# Patient Record
Sex: FEMALE | Race: WHITE | Employment: PART TIME | URBAN - NONMETROPOLITAN AREA
[De-identification: names, ages, dates, MRNs, and addresses within clinical notes are randomized per-mention and may not be internally consistent; named-entity substitution may affect disease eponyms.]

---

## 2017-01-12 ENCOUNTER — OFFICE VISIT (OUTPATIENT)
Dept: FAMILY MEDICINE CLINIC | Age: 37
End: 2017-01-12

## 2017-01-12 VITALS
RESPIRATION RATE: 16 BRPM | OXYGEN SATURATION: 99 % | DIASTOLIC BLOOD PRESSURE: 60 MMHG | TEMPERATURE: 98.2 F | WEIGHT: 250.5 LBS | SYSTOLIC BLOOD PRESSURE: 114 MMHG | BODY MASS INDEX: 41.69 KG/M2 | HEART RATE: 92 BPM

## 2017-01-12 DIAGNOSIS — R09.82 POSTNASAL DRIP: Primary | ICD-10-CM

## 2017-01-12 DIAGNOSIS — R05.8 UPPER AIRWAY COUGH SYNDROME: ICD-10-CM

## 2017-01-12 PROBLEM — J06.9 UPPER RESPIRATORY TRACT INFECTION: Status: ACTIVE | Noted: 2017-01-12

## 2017-01-12 PROCEDURE — 99213 OFFICE O/P EST LOW 20 MIN: CPT | Performed by: NURSE PRACTITIONER

## 2017-01-12 RX ORDER — FLUTICASONE PROPIONATE 50 MCG
SPRAY, SUSPENSION (ML) NASAL
Qty: 1 BOTTLE | Refills: 5 | Status: SHIPPED | OUTPATIENT
Start: 2017-01-12 | End: 2018-04-16

## 2017-01-12 ASSESSMENT — ENCOUNTER SYMPTOMS
FACIAL SWELLING: 0
SHORTNESS OF BREATH: 0
CHEST TIGHTNESS: 0
VOMITING: 0
NAUSEA: 0
SORE THROAT: 0
EYES NEGATIVE: 1
DIARRHEA: 0
COUGH: 1
ABDOMINAL PAIN: 0
TROUBLE SWALLOWING: 0
SINUS PRESSURE: 1
WHEEZING: 0
RHINORRHEA: 0

## 2017-02-13 ENCOUNTER — TELEPHONE (OUTPATIENT)
Dept: FAMILY MEDICINE CLINIC | Age: 37
End: 2017-02-13

## 2017-02-14 RX ORDER — OSELTAMIVIR PHOSPHATE 75 MG/1
75 CAPSULE ORAL 2 TIMES DAILY
Qty: 10 CAPSULE | Refills: 0 | Status: SHIPPED | OUTPATIENT
Start: 2017-02-14 | End: 2017-02-19

## 2017-03-09 ENCOUNTER — TELEPHONE (OUTPATIENT)
Dept: FAMILY MEDICINE CLINIC | Age: 37
End: 2017-03-09

## 2017-03-14 ENCOUNTER — OFFICE VISIT (OUTPATIENT)
Dept: FAMILY MEDICINE CLINIC | Age: 37
End: 2017-03-14

## 2017-03-14 VITALS
WEIGHT: 252.2 LBS | HEART RATE: 94 BPM | HEIGHT: 64 IN | DIASTOLIC BLOOD PRESSURE: 70 MMHG | TEMPERATURE: 98 F | OXYGEN SATURATION: 99 % | SYSTOLIC BLOOD PRESSURE: 112 MMHG | BODY MASS INDEX: 43.06 KG/M2

## 2017-03-14 DIAGNOSIS — Z20.818 EXPOSURE TO STREP THROAT: ICD-10-CM

## 2017-03-14 DIAGNOSIS — D22.9 NEVUS: ICD-10-CM

## 2017-03-14 DIAGNOSIS — J40 BRONCHITIS: ICD-10-CM

## 2017-03-14 DIAGNOSIS — J02.9 SORE THROAT: Primary | ICD-10-CM

## 2017-03-14 DIAGNOSIS — Z20.828 EXPOSURE TO THE FLU: ICD-10-CM

## 2017-03-14 LAB
INFLUENZA A ANTIBODY: NEGATIVE
INFLUENZA B ANTIBODY: NEGATIVE
S PYO AG THROAT QL: NORMAL

## 2017-03-14 PROCEDURE — 87804 INFLUENZA ASSAY W/OPTIC: CPT | Performed by: FAMILY MEDICINE

## 2017-03-14 PROCEDURE — 99214 OFFICE O/P EST MOD 30 MIN: CPT | Performed by: FAMILY MEDICINE

## 2017-03-14 PROCEDURE — 87880 STREP A ASSAY W/OPTIC: CPT | Performed by: FAMILY MEDICINE

## 2017-03-14 RX ORDER — LEVOFLOXACIN 500 MG/1
500 TABLET, FILM COATED ORAL DAILY
Qty: 7 TABLET | Refills: 0 | Status: SHIPPED | OUTPATIENT
Start: 2017-03-14 | End: 2017-03-21

## 2017-03-14 RX ORDER — TRAMADOL HYDROCHLORIDE 50 MG/1
50 TABLET ORAL EVERY 6 HOURS PRN
Qty: 10 TABLET | Refills: 0 | Status: SHIPPED | OUTPATIENT
Start: 2017-03-14 | End: 2017-03-21

## 2017-03-14 RX ORDER — LEVOFLOXACIN 500 MG/1
500 TABLET, FILM COATED ORAL DAILY
Qty: 7 TABLET | Refills: 0 | Status: SHIPPED | OUTPATIENT
Start: 2017-03-14 | End: 2017-03-14 | Stop reason: SDUPTHER

## 2017-03-26 ASSESSMENT — ENCOUNTER SYMPTOMS
NAUSEA: 1
ABDOMINAL PAIN: 0
BLURRED VISION: 0
COUGH: 0
SORE THROAT: 1
VOMITING: 0

## 2017-08-10 ENCOUNTER — OFFICE VISIT (OUTPATIENT)
Dept: FAMILY MEDICINE CLINIC | Age: 37
End: 2017-08-10

## 2017-08-10 ENCOUNTER — HOSPITAL ENCOUNTER (OUTPATIENT)
Dept: GENERAL RADIOLOGY | Age: 37
Discharge: HOME | End: 2017-08-10
Attending: FAMILY MEDICINE | Admitting: FAMILY MEDICINE

## 2017-08-10 VITALS
OXYGEN SATURATION: 98 % | BODY MASS INDEX: 41.83 KG/M2 | HEART RATE: 93 BPM | WEIGHT: 245 LBS | HEIGHT: 64 IN | DIASTOLIC BLOOD PRESSURE: 78 MMHG | SYSTOLIC BLOOD PRESSURE: 114 MMHG

## 2017-08-10 DIAGNOSIS — R07.1 CHEST PAIN VARYING WITH BREATHING: ICD-10-CM

## 2017-08-10 DIAGNOSIS — R06.00 DYSPNEA, UNSPECIFIED TYPE: ICD-10-CM

## 2017-08-10 DIAGNOSIS — J02.9 SORE THROAT: Primary | ICD-10-CM

## 2017-08-10 DIAGNOSIS — R51.9 HEADACHE, UNSPECIFIED HEADACHE TYPE: ICD-10-CM

## 2017-08-10 LAB — S PYO AG THROAT QL: NORMAL

## 2017-08-10 PROCEDURE — 87880 STREP A ASSAY W/OPTIC: CPT | Performed by: FAMILY MEDICINE

## 2017-08-10 PROCEDURE — 99213 OFFICE O/P EST LOW 20 MIN: CPT | Performed by: FAMILY MEDICINE

## 2017-08-10 ASSESSMENT — ENCOUNTER SYMPTOMS
BLURRED VISION: 0
SHORTNESS OF BREATH: 1
SPUTUM PRODUCTION: 0
COUGH: 0
SORE THROAT: 1
WHEEZING: 0
ABDOMINAL PAIN: 0

## 2018-03-02 ENCOUNTER — OFFICE VISIT (OUTPATIENT)
Dept: FAMILY MEDICINE CLINIC | Age: 38
End: 2018-03-02

## 2018-03-02 VITALS
OXYGEN SATURATION: 98 % | DIASTOLIC BLOOD PRESSURE: 70 MMHG | WEIGHT: 256.4 LBS | HEART RATE: 88 BPM | SYSTOLIC BLOOD PRESSURE: 114 MMHG | BODY MASS INDEX: 43.77 KG/M2 | HEIGHT: 64 IN

## 2018-03-02 DIAGNOSIS — F41.0 PANIC ATTACK: ICD-10-CM

## 2018-03-02 DIAGNOSIS — F41.9 ANXIETY: ICD-10-CM

## 2018-03-02 DIAGNOSIS — N61.1 ABSCESS OF FEMALE BREAST: Primary | ICD-10-CM

## 2018-03-02 PROCEDURE — 99214 OFFICE O/P EST MOD 30 MIN: CPT | Performed by: FAMILY MEDICINE

## 2018-03-02 RX ORDER — AMOXICILLIN AND CLAVULANATE POTASSIUM 875; 125 MG/1; MG/1
1 TABLET, FILM COATED ORAL 3 TIMES DAILY
Qty: 21 TABLET | Refills: 0 | Status: SHIPPED | OUTPATIENT
Start: 2018-03-02 | End: 2018-03-09

## 2018-03-02 RX ORDER — BUSPIRONE HYDROCHLORIDE 7.5 MG/1
7.5 TABLET ORAL 3 TIMES DAILY PRN
Qty: 60 TABLET | Refills: 2 | Status: SHIPPED | OUTPATIENT
Start: 2018-03-02 | End: 2018-04-16 | Stop reason: ALTCHOICE

## 2018-03-02 RX ORDER — ESCITALOPRAM OXALATE 10 MG/1
10 TABLET ORAL DAILY
Qty: 30 TABLET | Refills: 3 | Status: SHIPPED | OUTPATIENT
Start: 2018-03-02 | End: 2018-09-18

## 2018-03-02 ASSESSMENT — PATIENT HEALTH QUESTIONNAIRE - PHQ9
1. LITTLE INTEREST OR PLEASURE IN DOING THINGS: 0
2. FEELING DOWN, DEPRESSED OR HOPELESS: 0
SUM OF ALL RESPONSES TO PHQ QUESTIONS 1-9: 0
SUM OF ALL RESPONSES TO PHQ9 QUESTIONS 1 & 2: 0

## 2018-03-02 NOTE — PROGRESS NOTES
BP: 114/70   Site: Left Arm   Position: Sitting   Cuff Size: Large Adult   Pulse: 88   SpO2: 98%   Weight: 256 lb 6.4 oz (116.3 kg)   Height: 5' 4\" (1.626 m)          BP Readings from Last 3 Encounters:   03/02/18 114/70   08/10/17 114/78   03/14/17 112/70     Wt Readings from Last 3 Encounters:   03/02/18 256 lb 6.4 oz (116.3 kg)   08/10/17 245 lb (111.1 kg)   03/14/17 252 lb 3.2 oz (114.4 kg)     Body mass index is 44.01 kg/m². No results found for this visit on 03/02/18. Physical Exam   Constitutional: She is oriented to person, place, and time. She appears well-developed and well-nourished. No distress. HENT:   Head: Normocephalic and atraumatic. Eyes: Conjunctivae are normal. Pupils are equal, round, and reactive to light. Cardiovascular: Normal rate, regular rhythm and normal heart sounds. No murmur heard. Pulmonary/Chest: Effort normal and breath sounds normal. No respiratory distress. She has no wheezes. Neurological: She is alert and oriented to person, place, and time. Skin: Skin is warm and dry. She is not diaphoretic. There is erythema (right breast with edema, superficial seeping and non tense but tender lump in the lower half of breast). Nursing note and vitals reviewed. _________________________________________________  Assessment:     Lorenzo Villalta was seen today for wound infection and anxiety. Diagnoses and all orders for this visit:    Abscess of female breast  -     amoxicillin-clavulanate (AUGMENTIN) 875-125 MG per tablet; Take 1 tablet by mouth 3 times daily for 7 days    Anxiety  -     escitalopram (LEXAPRO) 10 MG tablet; Take 1 tablet by mouth daily    Panic attack  -     busPIRone (BUSPAR) 7.5 MG tablet; Take 1 tablet by mouth 3 times daily as needed (anxiety)        _________________________________________________  Plan:     I asked her to consider counselling, she declined referral.  Meds as above for mood.     Med as above for infection, also warm compresses and call if not improving. Return if symptoms worsen or fail to improve. Encounter note is signed electronically at date and time of note closure.

## 2018-03-02 NOTE — PATIENT INSTRUCTIONS
volunteer to help others. Being alone sometimes makes things seem worse than they are. · Get at least 30 minutes of exercise on most days of the week to relieve stress. Walking is a good choice. You also may want to do other activities, such as running, swimming, cycling, or playing tennis or team sports. Relaxation techniques  Do relaxation exercises for 10 to 20 minutes a day. You can play soothing, relaxing music while you do them, if you wish. · Tell others in your house that you are going to do your relaxation exercises. Ask them not to disturb you. · Find a comfortable place, away from all distractions and noise. · Lie down on your back, or sit with your back straight. · Focus on your breathing. Make it slow and steady. · Breathe in through your nose. Breathe out through either your nose or mouth. · Breathe deeply, filling up the area between your navel and your rib cage. Breathe so that your belly goes up and down. · Do not hold your breath. · Breathe like this for 5 to 10 minutes. Notice the feeling of calmness throughout your whole body. As you continue to breathe slowly and deeply, relax by doing the following for another 5 to 10 minutes:  · Tighten and relax each muscle group in your body. You can begin at your toes and work your way up to your head. · Imagine your muscle groups relaxing and becoming heavy. · Empty your mind of all thoughts. · Let yourself relax more and more deeply. · Become aware of the state of calmness that surrounds you. · When your relaxation time is over, you can bring yourself back to alertness by moving your fingers and toes and then your hands and feet and then stretching and moving your entire body. Sometimes people fall asleep during relaxation, but they usually wake up shortly afterward. · Always give yourself time to return to full alertness before you drive a car or do anything that might cause an accident if you are not fully alert.  Never play a relaxation tape while driving a car. When should you call for help? Call 911 anytime you think you may need emergency care. For example, call if:  ? · You feel you cannot stop from hurting yourself or someone else. ? Watch closely for changes in your health, and be sure to contact your doctor if:  ? · Your panic attacks get worse. ? · You have new or different anxiety. ? · You are not getting better as expected. Where can you learn more? Go to https://Made2Manage Systems.Reduce Data. org and sign in to your Thinkspeed account. Enter H601 in the [address] box to learn more about \"Panic Attacks: Care Instructions. \"     If you do not have an account, please click on the \"Sign Up Now\" link. Current as of: May 12, 2017  Content Version: 11.5  © 5573-1995 Healthwise, Incorporated. Care instructions adapted under license by [facility] ([facility]). If you have questions about a medical condition or this instruction, always ask your healthcare professional. [place] [address] any warranty or liability for your use of this information.

## 2018-03-06 ENCOUNTER — TELEPHONE (OUTPATIENT)
Dept: FAMILY MEDICINE CLINIC | Age: 38
End: 2018-03-06

## 2018-03-06 NOTE — TELEPHONE ENCOUNTER
Suggest she continue current medications and hydrate to see if her headache will improve with that after 1 or 2 days. It may also be from breast infection. If headache is not improving it may be a side effect of Lexapro and she would have to stop that medication. She may need a followup.

## 2018-03-11 ASSESSMENT — ENCOUNTER SYMPTOMS
ABDOMINAL PAIN: 0
BLURRED VISION: 0
COUGH: 0

## 2018-04-13 ENCOUNTER — OFFICE VISIT (OUTPATIENT)
Dept: FAMILY MEDICINE CLINIC | Age: 38
End: 2018-04-13

## 2018-04-13 VITALS
OXYGEN SATURATION: 98 % | WEIGHT: 265.2 LBS | SYSTOLIC BLOOD PRESSURE: 122 MMHG | DIASTOLIC BLOOD PRESSURE: 70 MMHG | HEIGHT: 64 IN | RESPIRATION RATE: 14 BRPM | HEART RATE: 86 BPM | BODY MASS INDEX: 45.27 KG/M2

## 2018-04-13 DIAGNOSIS — K08.89 PAIN, DENTAL: ICD-10-CM

## 2018-04-13 DIAGNOSIS — R31.9 URINARY TRACT INFECTION WITH HEMATURIA, SITE UNSPECIFIED: Primary | ICD-10-CM

## 2018-04-13 DIAGNOSIS — N39.0 URINARY TRACT INFECTION WITH HEMATURIA, SITE UNSPECIFIED: Primary | ICD-10-CM

## 2018-04-13 LAB
BILIRUBIN, POC: NEGATIVE
BLOOD URINE, POC: ABNORMAL
CLARITY, POC: ABNORMAL
COLOR, POC: ABNORMAL
CONTROL: NORMAL
GLUCOSE URINE, POC: NEGATIVE
KETONES, POC: NEGATIVE
LEUKOCYTE EST, POC: ABNORMAL
NITRITE, POC: NEGATIVE
PH, POC: 5.5
PREGNANCY TEST URINE, POC: NORMAL
PROTEIN, POC: ABNORMAL
SPECIFIC GRAVITY, POC: 1.02
UROBILINOGEN, POC: ABNORMAL

## 2018-04-13 PROCEDURE — 81002 URINALYSIS NONAUTO W/O SCOPE: CPT | Performed by: NURSE PRACTITIONER

## 2018-04-13 PROCEDURE — 99213 OFFICE O/P EST LOW 20 MIN: CPT | Performed by: NURSE PRACTITIONER

## 2018-04-13 PROCEDURE — 81025 URINE PREGNANCY TEST: CPT | Performed by: NURSE PRACTITIONER

## 2018-04-13 RX ORDER — SULFAMETHOXAZOLE AND TRIMETHOPRIM 800; 160 MG/1; MG/1
1 TABLET ORAL 2 TIMES DAILY
Qty: 14 TABLET | Refills: 0 | Status: SHIPPED | OUTPATIENT
Start: 2018-04-13 | End: 2018-04-20

## 2018-04-13 RX ORDER — PHENAZOPYRIDINE HYDROCHLORIDE 200 MG/1
200 TABLET, FILM COATED ORAL 3 TIMES DAILY PRN
Qty: 6 TABLET | Refills: 0 | Status: SHIPPED | OUTPATIENT
Start: 2018-04-13 | End: 2018-04-15

## 2018-04-13 RX ORDER — HYDROCODONE BITARTRATE AND ACETAMINOPHEN 5; 325 MG/1; MG/1
1 TABLET ORAL 2 TIMES DAILY PRN
Qty: 14 TABLET | Refills: 0 | Status: SHIPPED | OUTPATIENT
Start: 2018-04-13 | End: 2018-04-20

## 2018-04-13 ASSESSMENT — ENCOUNTER SYMPTOMS
ABDOMINAL PAIN: 0
VOMITING: 0
SHORTNESS OF BREATH: 0
BACK PAIN: 0
NAUSEA: 0

## 2018-04-15 LAB
ORGANISM: ABNORMAL
URINE CULTURE, ROUTINE: ABNORMAL
URINE CULTURE, ROUTINE: ABNORMAL

## 2018-04-16 ENCOUNTER — OFFICE VISIT (OUTPATIENT)
Dept: FAMILY MEDICINE CLINIC | Age: 38
End: 2018-04-16

## 2018-04-16 ENCOUNTER — TELEPHONE (OUTPATIENT)
Dept: FAMILY MEDICINE CLINIC | Age: 38
End: 2018-04-16

## 2018-04-16 VITALS
DIASTOLIC BLOOD PRESSURE: 64 MMHG | SYSTOLIC BLOOD PRESSURE: 108 MMHG | OXYGEN SATURATION: 98 % | BODY MASS INDEX: 43.6 KG/M2 | WEIGHT: 254 LBS | HEART RATE: 82 BPM

## 2018-04-16 DIAGNOSIS — L23.9 ALLERGIC DERMATITIS: Primary | ICD-10-CM

## 2018-04-16 PROCEDURE — 99213 OFFICE O/P EST LOW 20 MIN: CPT | Performed by: NURSE PRACTITIONER

## 2018-04-16 RX ORDER — PREDNISONE 20 MG/1
40 TABLET ORAL DAILY
Qty: 10 TABLET | Refills: 0 | Status: SHIPPED | OUTPATIENT
Start: 2018-04-16 | End: 2018-04-21

## 2018-04-16 ASSESSMENT — ENCOUNTER SYMPTOMS
DIARRHEA: 0
CHEST TIGHTNESS: 0
VOMITING: 0
COUGH: 0
EYE PAIN: 0
NAIL CHANGES: 0
SHORTNESS OF BREATH: 0
NAUSEA: 0
RHINORRHEA: 0
WHEEZING: 0
SORE THROAT: 0

## 2018-09-18 ENCOUNTER — OFFICE VISIT (OUTPATIENT)
Dept: FAMILY MEDICINE CLINIC | Age: 38
End: 2018-09-18

## 2018-09-18 VITALS
BODY MASS INDEX: 40.82 KG/M2 | WEIGHT: 237.8 LBS | HEART RATE: 80 BPM | SYSTOLIC BLOOD PRESSURE: 120 MMHG | OXYGEN SATURATION: 99 % | DIASTOLIC BLOOD PRESSURE: 76 MMHG

## 2018-09-18 DIAGNOSIS — S00.32XA: Primary | ICD-10-CM

## 2018-09-18 PROCEDURE — 99213 OFFICE O/P EST LOW 20 MIN: CPT | Performed by: NURSE PRACTITIONER

## 2018-09-18 RX ORDER — DIAPER,BRIEF,INFANT-TODD,DISP
EACH MISCELLANEOUS
Qty: 30 G | Refills: 0 | Status: SHIPPED | OUTPATIENT
Start: 2018-09-18 | End: 2018-09-28

## 2018-09-18 RX ORDER — PANTOPRAZOLE SODIUM 40 MG/1
40 TABLET, DELAYED RELEASE ORAL
COMMUNITY
Start: 2018-09-11 | End: 2018-10-08 | Stop reason: SDUPTHER

## 2018-09-18 RX ORDER — HYDROCODONE BITARTRATE AND ACETAMINOPHEN 5; 325 MG/1; MG/1
TABLET ORAL
Refills: 0 | COMMUNITY
Start: 2018-09-06 | End: 2018-10-10 | Stop reason: ALTCHOICE

## 2018-09-18 ASSESSMENT — ENCOUNTER SYMPTOMS
NAUSEA: 0
VOMITING: 0
RESPIRATORY NEGATIVE: 1
DIARRHEA: 0

## 2018-09-18 NOTE — PROGRESS NOTES
Sunita Challenger   45 y.o.  female  [id number]      Chief Complaint   Patient presents with    Rash     x last night on right nostril, she states she just got home from [facility]. Subjective:  45 y. o.female is here for a follow up. She has the following chronic/acute medical problems:   Patient Active Problem List   Diagnosis    Family history of multiple sclerosis    Pneumonia of right upper lobe due to infectious organism ([address]    Chronic nonintractable headache    Upper respiratory tract infection    Dysmenorrhea     Patient states she is here today she has some blister on the inside of her right nostril. Patient denies any pain at this time. Patient states she gets cold sores but has had impetego before and is worried this is what she may have. She said she was recently she was in [place] and may be afraid she picked up something from the beach. Review of Systems   Constitutional: Negative for appetite change, chills, fatigue and fever. HENT: Negative. Respiratory: Negative. Cardiovascular: Negative for chest pain and palpitations. Gastrointestinal: Negative for diarrhea, nausea and vomiting. Skin: Positive for rash (blister right side nostril). Neurological: Negative for dizziness, light-headedness and headaches. Current Outpatient Prescriptions   Medication Sig Dispense Refill    pantoprazole (PROTONIX) 40 MG tablet Take 40 mg by mouth      bacitracin zinc 500 UNIT/GM ointment Apply topically 2 times daily. 30 g 0    ondansetron (ZOFRAN) 4 MG tablet Take 1 tablet by mouth every 8 hours as needed for Nausea or Vomiting 20 tablet 0    Multiple Vitamins-Minerals (MULTI COMPLETE PO) Take by mouth      naproxen (NAPROSYN) 500 MG tablet   0    acetaminophen (TYLENOL) 500 MG tablet Take 500 mg by mouth every 6 hours as needed for Pain      ibuprofen (ADVIL;MOTRIN) 800 MG tablet Take 800 mg by mouth every 6 hours as needed.         HYDROcodone-acetaminophen ([address])

## 2018-10-08 ENCOUNTER — OFFICE VISIT (OUTPATIENT)
Dept: FAMILY MEDICINE CLINIC | Age: 38
End: 2018-10-08
Payer: COMMERCIAL

## 2018-10-08 VITALS
HEIGHT: 64 IN | BODY MASS INDEX: 40.7 KG/M2 | WEIGHT: 238.4 LBS | TEMPERATURE: 98.9 F | OXYGEN SATURATION: 98 % | SYSTOLIC BLOOD PRESSURE: 116 MMHG | DIASTOLIC BLOOD PRESSURE: 74 MMHG | HEART RATE: 88 BPM

## 2018-10-08 DIAGNOSIS — J40 BRONCHITIS: Primary | ICD-10-CM

## 2018-10-08 DIAGNOSIS — K29.00 ACUTE SUPERFICIAL GASTRITIS WITHOUT HEMORRHAGE: ICD-10-CM

## 2018-10-08 DIAGNOSIS — T39.395A NSAID INDUCED GASTRITIS: ICD-10-CM

## 2018-10-08 DIAGNOSIS — K29.60 NSAID INDUCED GASTRITIS: ICD-10-CM

## 2018-10-08 PROCEDURE — 99213 OFFICE O/P EST LOW 20 MIN: CPT | Performed by: FAMILY MEDICINE

## 2018-10-08 RX ORDER — AZITHROMYCIN 250 MG/1
TABLET, FILM COATED ORAL
Qty: 6 TABLET | Refills: 1 | Status: SHIPPED | OUTPATIENT
Start: 2018-10-08 | End: 2018-10-18

## 2018-10-08 RX ORDER — PANTOPRAZOLE SODIUM 40 MG/1
40 TABLET, DELAYED RELEASE ORAL DAILY
Qty: 30 TABLET | Refills: 2 | Status: SHIPPED | OUTPATIENT
Start: 2018-10-08 | End: 2019-01-09 | Stop reason: SDUPTHER

## 2018-10-10 PROBLEM — T39.395A NSAID INDUCED GASTRITIS: Status: ACTIVE | Noted: 2018-10-10

## 2018-10-10 PROBLEM — J06.9 UPPER RESPIRATORY TRACT INFECTION: Status: RESOLVED | Noted: 2017-01-12 | Resolved: 2018-10-10

## 2018-10-10 PROBLEM — K29.60 NSAID INDUCED GASTRITIS: Status: ACTIVE | Noted: 2018-10-10

## 2018-10-12 ENCOUNTER — TELEPHONE (OUTPATIENT)
Dept: FAMILY MEDICINE CLINIC | Age: 38
End: 2018-10-12

## 2018-10-18 ENCOUNTER — HOSPITAL ENCOUNTER (OUTPATIENT)
Age: 38
Discharge: HOME | End: 2018-10-18
Payer: COMMERCIAL

## 2018-10-18 ENCOUNTER — OFFICE VISIT (OUTPATIENT)
Dept: FAMILY MEDICINE CLINIC | Age: 38
End: 2018-10-18
Payer: COMMERCIAL

## 2018-10-18 ENCOUNTER — HOSPITAL ENCOUNTER (OUTPATIENT)
Dept: GENERAL RADIOLOGY | Age: 38
Discharge: HOME | End: 2018-10-18
Payer: COMMERCIAL

## 2018-10-18 VITALS
HEART RATE: 78 BPM | DIASTOLIC BLOOD PRESSURE: 80 MMHG | WEIGHT: 236 LBS | BODY MASS INDEX: 40.51 KG/M2 | OXYGEN SATURATION: 97 % | SYSTOLIC BLOOD PRESSURE: 116 MMHG

## 2018-10-18 DIAGNOSIS — M54.2 CERVICALGIA: ICD-10-CM

## 2018-10-18 DIAGNOSIS — K29.60 NSAID INDUCED GASTRITIS: ICD-10-CM

## 2018-10-18 DIAGNOSIS — R00.2 PALPITATION: ICD-10-CM

## 2018-10-18 DIAGNOSIS — M79.10 MYALGIA: ICD-10-CM

## 2018-10-18 DIAGNOSIS — T39.395A NSAID INDUCED GASTRITIS: ICD-10-CM

## 2018-10-18 DIAGNOSIS — M54.2 CERVICALGIA: Primary | ICD-10-CM

## 2018-10-18 PROCEDURE — 72050 X-RAY EXAM NECK SPINE 4/5VWS: CPT

## 2018-10-18 PROCEDURE — 99214 OFFICE O/P EST MOD 30 MIN: CPT | Performed by: FAMILY MEDICINE

## 2018-10-18 RX ORDER — CYCLOBENZAPRINE HCL 10 MG
10 TABLET ORAL 3 TIMES DAILY PRN
Qty: 30 TABLET | Refills: 1 | Status: SHIPPED | OUTPATIENT
Start: 2018-10-18 | End: 2018-10-28

## 2018-10-18 ASSESSMENT — PATIENT HEALTH QUESTIONNAIRE - PHQ9
SUM OF ALL RESPONSES TO PHQ QUESTIONS 1-9: 0
1. LITTLE INTEREST OR PLEASURE IN DOING THINGS: 0
SUM OF ALL RESPONSES TO PHQ9 QUESTIONS 1 & 2: 0
SUM OF ALL RESPONSES TO PHQ QUESTIONS 1-9: 0
2. FEELING DOWN, DEPRESSED OR HOPELESS: 0

## 2018-10-18 NOTE — PATIENT INSTRUCTIONS
depression. · Learn about fibromyalgia. This makes coping easier. Then, take an active role in your treatment. · Think about joining a support group with others who have fibromyalgia to learn more and get support. When should you call for help? Watch closely for changes in your health, and be sure to contact your doctor if:    · You feel sad, helpless, or hopeless; lose interest in things you used to enjoy; or have other symptoms of depression.     · Your fibromyalgia symptoms get worse. Where can you learn more? Go to https://Tagito.[employer]. org and sign in to your [employer] account. Enter V003 in the AdviseHub box to learn more about \"Fibromyalgia: Care Instructions. \"     If you do not have an account, please click on the \"Sign Up Now\" link. Current as of: October 9, 2017  Content Version: 11.7  © 1636-9186 [employer], Incorporated. Care instructions adapted under license by [facility] ([facility]). If you have questions about a medical condition or this instruction, always ask your healthcare professional. [address] any warranty or liability for your use of this information.

## 2018-10-18 NOTE — PROGRESS NOTES
Marked as Taking for the 10/18/18 encounter (Office Visit) with Ashlee White MD   Medication Sig Dispense Refill    cyclobenzaprine (FLEXERIL) 10 MG tablet Take 1 tablet by mouth 3 times daily as needed for Muscle spasms 30 tablet 1    pantoprazole (PROTONIX) 40 MG tablet Take 1 tablet by mouth daily 30 tablet 2    Multiple Vitamins-Minerals (MULTI COMPLETE PO) Take by mouth      acetaminophen (TYLENOL) 500 MG tablet Take 500 mg by mouth every 6 hours as needed for Pain         Tobacco use history updated. History   Smoking Status    Former Smoker    Types: Cigarettes    Quit date: 7/25/1998   Smokeless Tobacco    Never Used        Nursing note reviewed. Vitals:    10/18/18 1149   BP: 116/80   Site: Left Upper Arm   Position: Sitting   Cuff Size: Medium Adult   Pulse: 78   SpO2: 97%   Weight: 236 lb (107 kg)          BP Readings from Last 3 Encounters:   10/18/18 116/80   10/08/18 116/74   09/18/18 120/76     Wt Readings from Last 3 Encounters:   10/18/18 236 lb (107 kg)   10/08/18 238 lb 6.4 oz (108.1 kg)   09/18/18 237 lb 12.8 oz (107.9 kg)     Body mass index is 40.51 kg/m². No results found for this visit on 10/18/18. Physical Exam   Constitutional: She is oriented to person, place, and time. She appears well-developed and well-nourished. No distress. HENT:   Head: Normocephalic. Mouth/Throat: Oropharynx is clear and moist.   Eyes: Pupils are equal, round, and reactive to light. Conjunctivae are normal. Right eye exhibits no discharge. Left eye exhibits no discharge. Neck: Normal range of motion. Cardiovascular: Normal rate, regular rhythm and normal heart sounds. No murmur heard. Pulmonary/Chest: Effort normal and breath sounds normal. No respiratory distress. She has no wheezes. She has no rales.    Musculoskeletal:   Neck range of motion is limited and painful, she has palpable spasm in the upper shoulder muscles   Neurological: She is alert and oriented to person, place, and time.   Skin: Skin is warm and dry. She is not diaphoretic. Psychiatric: She has a normal mood and affect. Her behavior is normal.   Nursing note and vitals reviewed. Outside records reviewed in detail.    _________________________________________________  Assessment:     Iza Pena was seen today for follow-up from hospital, neck pain, abdominal pain, other and palpitations. Diagnoses and all orders for this visit:    Cervicalgia  -     XR CERVICAL SPINE (4-5 VIEWS); Future  -     cyclobenzaprine (FLEXERIL) 10 MG tablet; Take 1 tablet by mouth 3 times daily as needed for Muscle spasms    NSAID induced gastritis    Myalgia    Palpitation      Problems listed above are stable and therapeutic plan is unchanged unless otherwise specified. See orders above and comments below for details of workup ormedication orders. _________________________________________________  Plan:     May need rheum. We discussed possibility of fibromyalgia and whether she met criteria of that. We will avoid diagnosing her at this time but will continue to workup neck problems. She will avoid NSAIDs to see if this helps her stomach problems and we'll defer further evaluation at this moment. 4 neck she may need MRI or physical therapy referral.  We'll see what x-rays look like. She could be a candidate for 18 therapy intervention as well    We will further evaluate fibromyalgia concern at follow-up visit. Flu shot at f-u    Return in about 2 weeks (around 11/1/2018), or if symptoms worsen or fail to improve, for fibromyalgai?, review neck xray and body pain.       Electronically signed by Andreina Garcia MD on 10/18/18 at 12:30 PM

## 2018-10-28 ASSESSMENT — ENCOUNTER SYMPTOMS
COUGH: 0
CHEST TIGHTNESS: 0
BACK PAIN: 0
ABDOMINAL PAIN: 0
WHEEZING: 0
SHORTNESS OF BREATH: 0

## 2018-10-29 ENCOUNTER — OFFICE VISIT (OUTPATIENT)
Dept: FAMILY MEDICINE CLINIC | Age: 38
End: 2018-10-29
Payer: COMMERCIAL

## 2018-10-29 VITALS
OXYGEN SATURATION: 99 % | SYSTOLIC BLOOD PRESSURE: 122 MMHG | WEIGHT: 238.6 LBS | DIASTOLIC BLOOD PRESSURE: 70 MMHG | HEART RATE: 88 BPM | BODY MASS INDEX: 40.96 KG/M2

## 2018-10-29 DIAGNOSIS — R53.83 FATIGUE, UNSPECIFIED TYPE: ICD-10-CM

## 2018-10-29 DIAGNOSIS — G89.29 CHRONIC PAIN OF RIGHT KNEE: ICD-10-CM

## 2018-10-29 DIAGNOSIS — M25.561 CHRONIC PAIN OF RIGHT KNEE: ICD-10-CM

## 2018-10-29 DIAGNOSIS — M79.10 MYALGIA: Primary | ICD-10-CM

## 2018-10-29 DIAGNOSIS — M54.2 CERVICALGIA: ICD-10-CM

## 2018-10-29 DIAGNOSIS — G43.809 OTHER MIGRAINE WITHOUT STATUS MIGRAINOSUS, NOT INTRACTABLE: ICD-10-CM

## 2018-10-29 DIAGNOSIS — Z23 NEED FOR INFLUENZA VACCINATION: ICD-10-CM

## 2018-10-29 LAB
A/G RATIO: 1.9 (ref 1.1–2.2)
ALBUMIN SERPL-MCNC: 4.9 G/DL (ref 3.4–5)
ALP BLD-CCNC: 80 U/L (ref 40–129)
ALT SERPL-CCNC: 22 U/L (ref 10–40)
ANION GAP SERPL CALCULATED.3IONS-SCNC: 17 MMOL/L (ref 3–16)
AST SERPL-CCNC: 19 U/L (ref 15–37)
BASOPHILS ABSOLUTE: 0 K/UL (ref 0–0.2)
BASOPHILS RELATIVE PERCENT: 0.5 %
BILIRUB SERPL-MCNC: 0.4 MG/DL (ref 0–1)
BUN BLDV-MCNC: 11 MG/DL (ref 7–20)
CALCIUM SERPL-MCNC: 9.8 MG/DL (ref 8.3–10.6)
CHLORIDE BLD-SCNC: 101 MMOL/L (ref 99–110)
CO2: 22 MMOL/L (ref 21–32)
CREAT SERPL-MCNC: 0.6 MG/DL (ref 0.6–1.1)
EOSINOPHILS ABSOLUTE: 0.1 K/UL (ref 0–0.6)
EOSINOPHILS RELATIVE PERCENT: 1.1 %
FOLATE: >20 NG/ML (ref 4.78–24.2)
GFR AFRICAN AMERICAN: >60
GFR NON-AFRICAN AMERICAN: >60
GLOBULIN: 2.6 G/DL
GLUCOSE BLD-MCNC: 93 MG/DL (ref 70–99)
HCT VFR BLD CALC: 33.2 % (ref 36–48)
HEMOGLOBIN: 11 G/DL (ref 12–16)
LYMPHOCYTES ABSOLUTE: 2.2 K/UL (ref 1–5.1)
LYMPHOCYTES RELATIVE PERCENT: 26.3 %
MCH RBC QN AUTO: 28.2 PG (ref 26–34)
MCHC RBC AUTO-ENTMCNC: 33.1 G/DL (ref 31–36)
MCV RBC AUTO: 85.1 FL (ref 80–100)
MONOCYTES ABSOLUTE: 0.7 K/UL (ref 0–1.3)
MONOCYTES RELATIVE PERCENT: 8 %
NEUTROPHILS ABSOLUTE: 5.3 K/UL (ref 1.7–7.7)
NEUTROPHILS RELATIVE PERCENT: 64.1 %
PDW BLD-RTO: 13.9 % (ref 12.4–15.4)
PLATELET # BLD: 352 K/UL (ref 135–450)
PMV BLD AUTO: 9.2 FL (ref 5–10.5)
POTASSIUM SERPL-SCNC: 4.6 MMOL/L (ref 3.5–5.1)
RBC # BLD: 3.9 M/UL (ref 4–5.2)
SEDIMENTATION RATE, ERYTHROCYTE: 21 MM/HR (ref 0–20)
SODIUM BLD-SCNC: 140 MMOL/L (ref 136–145)
TOTAL PROTEIN: 7.5 G/DL (ref 6.4–8.2)
TSH SERPL DL<=0.05 MIU/L-ACNC: 1.31 UIU/ML (ref 0.27–4.2)
VITAMIN B-12: 570 PG/ML (ref 211–911)
VITAMIN D 25-HYDROXY: 34.6 NG/ML
WBC # BLD: 8.3 K/UL (ref 4–11)

## 2018-10-29 PROCEDURE — 90471 IMMUNIZATION ADMIN: CPT | Performed by: FAMILY MEDICINE

## 2018-10-29 PROCEDURE — 99214 OFFICE O/P EST MOD 30 MIN: CPT | Performed by: FAMILY MEDICINE

## 2018-10-29 PROCEDURE — 90686 IIV4 VACC NO PRSV 0.5 ML IM: CPT | Performed by: FAMILY MEDICINE

## 2018-10-29 PROCEDURE — 36415 COLL VENOUS BLD VENIPUNCTURE: CPT | Performed by: FAMILY MEDICINE

## 2018-10-29 NOTE — PROGRESS NOTES
Vaccine Information Sheet, \"Influenza - Inactivated\"  given to Christophe Miranda, or parent/legal guardian of  Christophe Miranda and verbalized understanding. Patient responses:    Have you ever had a reaction to a flu vaccine? No  Are you able to eat eggs without adverse effects? Yes  Do you have any current illness? No  Have you ever had Guillian Robert Lee Syndrome? No    Flu vaccine given per order. Please see immunization tab.

## 2018-10-30 ENCOUNTER — TELEPHONE (OUTPATIENT)
Dept: FAMILY MEDICINE CLINIC | Age: 38
End: 2018-10-30

## 2018-10-30 RX ORDER — ONDANSETRON 4 MG/1
4 TABLET, ORALLY DISINTEGRATING ORAL EVERY 8 HOURS PRN
Qty: 30 TABLET | Refills: 1 | Status: SHIPPED | OUTPATIENT
Start: 2018-10-30 | End: 2019-07-02 | Stop reason: SDUPTHER

## 2018-11-14 ENCOUNTER — OFFICE VISIT (OUTPATIENT)
Dept: FAMILY MEDICINE CLINIC | Age: 38
End: 2018-11-14
Payer: COMMERCIAL

## 2018-11-14 VITALS
WEIGHT: 240.8 LBS | SYSTOLIC BLOOD PRESSURE: 102 MMHG | HEART RATE: 89 BPM | BODY MASS INDEX: 41.33 KG/M2 | DIASTOLIC BLOOD PRESSURE: 60 MMHG | OXYGEN SATURATION: 99 %

## 2018-11-14 DIAGNOSIS — Z82.0 FAMILY HISTORY OF MULTIPLE SCLEROSIS: ICD-10-CM

## 2018-11-14 DIAGNOSIS — R51.9 NONINTRACTABLE EPISODIC HEADACHE, UNSPECIFIED HEADACHE TYPE: ICD-10-CM

## 2018-11-14 DIAGNOSIS — D64.9 MILD ANEMIA: ICD-10-CM

## 2018-11-14 DIAGNOSIS — R53.83 FATIGUE, UNSPECIFIED TYPE: ICD-10-CM

## 2018-11-14 DIAGNOSIS — M79.7 FIBROMYALGIA: Primary | ICD-10-CM

## 2018-11-14 PROCEDURE — 99214 OFFICE O/P EST MOD 30 MIN: CPT | Performed by: FAMILY MEDICINE

## 2018-11-25 ASSESSMENT — ENCOUNTER SYMPTOMS
CHEST TIGHTNESS: 0
ABDOMINAL PAIN: 0
SHORTNESS OF BREATH: 0
BACK PAIN: 0
WHEEZING: 0
COUGH: 0

## 2018-11-30 ASSESSMENT — ENCOUNTER SYMPTOMS
ABDOMINAL PAIN: 0
BACK PAIN: 0
WHEEZING: 0
CHEST TIGHTNESS: 0
SHORTNESS OF BREATH: 0
COUGH: 0

## 2019-01-09 DIAGNOSIS — K29.00 ACUTE SUPERFICIAL GASTRITIS WITHOUT HEMORRHAGE: ICD-10-CM

## 2019-01-09 RX ORDER — PANTOPRAZOLE SODIUM 40 MG/1
40 TABLET, DELAYED RELEASE ORAL DAILY
Qty: 30 TABLET | Refills: 5 | Status: SHIPPED | OUTPATIENT
Start: 2019-01-09 | End: 2019-01-31 | Stop reason: SDUPTHER

## 2019-01-31 ENCOUNTER — OFFICE VISIT (OUTPATIENT)
Dept: FAMILY MEDICINE CLINIC | Age: 39
End: 2019-01-31
Payer: COMMERCIAL

## 2019-01-31 VITALS
HEART RATE: 91 BPM | SYSTOLIC BLOOD PRESSURE: 126 MMHG | OXYGEN SATURATION: 99 % | WEIGHT: 250 LBS | BODY MASS INDEX: 42.68 KG/M2 | HEIGHT: 64 IN | DIASTOLIC BLOOD PRESSURE: 80 MMHG

## 2019-01-31 DIAGNOSIS — G93.2 PSEUDOTUMOR CEREBRI: ICD-10-CM

## 2019-01-31 DIAGNOSIS — H53.8 BLURRY VISION, RIGHT EYE: Primary | ICD-10-CM

## 2019-01-31 DIAGNOSIS — K29.00 ACUTE SUPERFICIAL GASTRITIS WITHOUT HEMORRHAGE: ICD-10-CM

## 2019-01-31 DIAGNOSIS — S86.811A STRAIN OF CALF MUSCLE, RIGHT, INITIAL ENCOUNTER: ICD-10-CM

## 2019-01-31 PROCEDURE — 99214 OFFICE O/P EST MOD 30 MIN: CPT | Performed by: FAMILY MEDICINE

## 2019-01-31 RX ORDER — PANTOPRAZOLE SODIUM 40 MG/1
40 TABLET, DELAYED RELEASE ORAL DAILY
Qty: 30 TABLET | Refills: 5 | Status: SHIPPED | OUTPATIENT
Start: 2019-01-31 | End: 2019-07-02 | Stop reason: SDUPTHER

## 2019-01-31 ASSESSMENT — ENCOUNTER SYMPTOMS
ABDOMINAL PAIN: 0
WHEEZING: 0
SHORTNESS OF BREATH: 0
COUGH: 0
CHEST TIGHTNESS: 0
BACK PAIN: 0

## 2019-01-31 ASSESSMENT — PATIENT HEALTH QUESTIONNAIRE - PHQ9
SUM OF ALL RESPONSES TO PHQ QUESTIONS 1-9: 0
SUM OF ALL RESPONSES TO PHQ9 QUESTIONS 1 & 2: 0
1. LITTLE INTEREST OR PLEASURE IN DOING THINGS: 0
2. FEELING DOWN, DEPRESSED OR HOPELESS: 0
SUM OF ALL RESPONSES TO PHQ QUESTIONS 1-9: 0

## 2019-04-02 ENCOUNTER — OFFICE VISIT (OUTPATIENT)
Dept: FAMILY MEDICINE CLINIC | Age: 39
End: 2019-04-02
Payer: COMMERCIAL

## 2019-04-02 VITALS
HEART RATE: 82 BPM | WEIGHT: 255.8 LBS | SYSTOLIC BLOOD PRESSURE: 106 MMHG | DIASTOLIC BLOOD PRESSURE: 68 MMHG | OXYGEN SATURATION: 98 % | BODY MASS INDEX: 43.91 KG/M2

## 2019-04-02 DIAGNOSIS — D23.9 DERMATOFIBROMA: ICD-10-CM

## 2019-04-02 DIAGNOSIS — F41.9 ANXIETY: ICD-10-CM

## 2019-04-02 DIAGNOSIS — R07.89 ATYPICAL CHEST PAIN: ICD-10-CM

## 2019-04-02 DIAGNOSIS — L91.8 SKIN TAG: ICD-10-CM

## 2019-04-02 DIAGNOSIS — F43.0 STRESS REACTION: Primary | ICD-10-CM

## 2019-04-02 DIAGNOSIS — G93.2 PSEUDOTUMOR CEREBRI: ICD-10-CM

## 2019-04-02 DIAGNOSIS — H53.8 BLURRY VISION: ICD-10-CM

## 2019-04-02 DIAGNOSIS — M79.7 FIBROMYALGIA: ICD-10-CM

## 2019-04-02 PROCEDURE — 99214 OFFICE O/P EST MOD 30 MIN: CPT | Performed by: FAMILY MEDICINE

## 2019-04-02 RX ORDER — TIZANIDINE 2 MG/1
2 TABLET ORAL NIGHTLY
Qty: 30 TABLET | Refills: 1 | Status: SHIPPED | OUTPATIENT
Start: 2019-04-02 | End: 2019-08-15 | Stop reason: SDUPTHER

## 2019-04-02 RX ORDER — HYDROXYZINE HYDROCHLORIDE 25 MG/1
25 TABLET, FILM COATED ORAL EVERY 8 HOURS PRN
Qty: 30 TABLET | Refills: 1 | Status: SHIPPED | OUTPATIENT
Start: 2019-04-02 | End: 2019-05-02

## 2019-04-02 NOTE — PROGRESS NOTES
Chief Complaint   Patient presents with    Chronic Pain     (fibromyalgai) patient is here for a recheck on chronic medical condition    Stress Reaction     daughter with illness    Chest Pain     tightiness and sharp pain in chest tight with cough       [place] NARRATIVE:    Eye doc thinks psuedotumor is back but she did not get med nor keep f-u due to kids illness. Had not had sx until today she had blurry vision. She will reschedule. Ill daughter is doing a little better. Watching spots on brain on MRI. Seeing neurology. Internal chest pain along ribs and front of chest.  Not tender she says but does have ant chest wall tenderness. Lateral ribs are not tender. Falied robaxin. A  dmits to stress with daughter. Patient is established unless otherwise noted. Above chief complaint and [place] obtained by this physician provider. Review of Systems    No Known Allergies  Allergy historyupdated. Outpatient Medications Marked as Taking for the 19 encounter (Office Visit) with Lizz Parsons MD   Medication Sig Dispense Refill    tiZANidine (ZANAFLEX) 2 MG tablet Take 1 tablet by mouth nightly 30 tablet 1    hydrOXYzine (ATARAX) 25 MG tablet Take 1 tablet by mouth every 8 hours as needed for Anxiety 30 tablet 1    pantoprazole (PROTONIX) 40 MG tablet Take 1 tablet by mouth daily 30 tablet 5    Multiple Vitamins-Minerals (MULTI COMPLETE PO) Take by mouth      acetaminophen (TYLENOL) 500 MG tablet Take 500 mg by mouth every 6 hours as needed for Pain         Tobacco use history updated. Social History     Tobacco Use   Smoking Status Former Smoker    Types: Cigarettes    Last attempt to quit: 1998    Years since quittin.7   Smokeless Tobacco Never Used        Nursing note reviewed.     Vitals:    19 1418   BP: 106/68   Site: Left Upper Arm   Position: Sitting   Cuff Size: Large Adult   Pulse: 82   SpO2: 98%   Weight: 255 lb 12.8 oz (116 kg)          BP Readings from

## 2019-04-03 ENCOUNTER — TELEPHONE (OUTPATIENT)
Dept: FAMILY MEDICINE CLINIC | Age: 39
End: 2019-04-03

## 2019-04-04 RX ORDER — OSELTAMIVIR PHOSPHATE 75 MG/1
75 CAPSULE ORAL DAILY
Qty: 10 CAPSULE | Refills: 0 | Status: SHIPPED | OUTPATIENT
Start: 2019-04-04 | End: 2019-04-14

## 2019-05-07 ENCOUNTER — TELEPHONE (OUTPATIENT)
Dept: FAMILY MEDICINE CLINIC | Age: 39
End: 2019-05-07

## 2019-05-07 NOTE — TELEPHONE ENCOUNTER
Patient called and stated that she was coughing up blood clots. Patient was advised to go to the ER for and evaluation.

## 2019-07-02 ENCOUNTER — OFFICE VISIT (OUTPATIENT)
Dept: FAMILY MEDICINE CLINIC | Age: 39
End: 2019-07-02
Payer: COMMERCIAL

## 2019-07-02 VITALS
HEIGHT: 64 IN | OXYGEN SATURATION: 98 % | DIASTOLIC BLOOD PRESSURE: 74 MMHG | SYSTOLIC BLOOD PRESSURE: 118 MMHG | HEART RATE: 84 BPM | WEIGHT: 242.4 LBS | BODY MASS INDEX: 41.38 KG/M2

## 2019-07-02 DIAGNOSIS — F41.0 PANIC ATTACKS: ICD-10-CM

## 2019-07-02 DIAGNOSIS — L25.5 RHUS DERMATITIS: ICD-10-CM

## 2019-07-02 DIAGNOSIS — M79.7 FIBROMYALGIA: Primary | ICD-10-CM

## 2019-07-02 DIAGNOSIS — K29.00 ACUTE SUPERFICIAL GASTRITIS WITHOUT HEMORRHAGE: ICD-10-CM

## 2019-07-02 PROCEDURE — 99214 OFFICE O/P EST MOD 30 MIN: CPT | Performed by: FAMILY MEDICINE

## 2019-07-02 RX ORDER — ONDANSETRON 4 MG/1
4 TABLET, ORALLY DISINTEGRATING ORAL EVERY 8 HOURS PRN
Qty: 30 TABLET | Refills: 1 | Status: SHIPPED | OUTPATIENT
Start: 2019-07-02 | End: 2020-01-02 | Stop reason: SDUPTHER

## 2019-07-02 RX ORDER — TIZANIDINE 2 MG/1
2 TABLET ORAL NIGHTLY
Qty: 30 TABLET | Refills: 1 | Status: CANCELLED | OUTPATIENT
Start: 2019-07-02

## 2019-07-02 RX ORDER — PANTOPRAZOLE SODIUM 40 MG/1
40 TABLET, DELAYED RELEASE ORAL DAILY
Qty: 30 TABLET | Refills: 5 | Status: SHIPPED | OUTPATIENT
Start: 2019-07-02 | End: 2020-01-02 | Stop reason: SDUPTHER

## 2019-07-02 ASSESSMENT — PATIENT HEALTH QUESTIONNAIRE - PHQ9
2. FEELING DOWN, DEPRESSED OR HOPELESS: 0
SUM OF ALL RESPONSES TO PHQ QUESTIONS 1-9: 0
SUM OF ALL RESPONSES TO PHQ QUESTIONS 1-9: 0
1. LITTLE INTEREST OR PLEASURE IN DOING THINGS: 0
SUM OF ALL RESPONSES TO PHQ9 QUESTIONS 1 & 2: 0

## 2019-07-02 ASSESSMENT — ENCOUNTER SYMPTOMS
SHORTNESS OF BREATH: 0
WHEEZING: 0
COUGH: 0
BACK PAIN: 0
CHEST TIGHTNESS: 0
ABDOMINAL PAIN: 0

## 2019-07-02 NOTE — PROGRESS NOTES
Chief Complaint   Patient presents with    Chronic Pain     patient is here for a recheck on fibromyalgia     Stress Reaction     lots of activities for her kids that are quite overwhelming    Mass     lump in posterior neck, like on top of trapezius--deep to skin but superficial to muscle belly       [place] NARRATIVE:    BP good and weight is down 13 pounds. Eye doc was worried about pseudotumor cerebrii. We referred her to Dr. Kristen Pimentel but she has not been able to schedule because she owes a payment for previous services. Headaches may be marginally less. Patient also with fibromyalgia and she was given rx for tizanidine and atarax for anxiety. She does not think either are working. Seen at [facility] in May with cough (bronchitis) with hemoptysis. Stress with daughter's illness and with two other kids at competitive swim and one at baseball. No slowing down for summer but speeding up instead. I pointed out to her that this physical and life stress likely were keeping her from having any relief from fibromyalgia pain, given nonstop activity and poor sleep. Has panic attacks when thinking about her daughter's health (neurological problems and abnormal MRI), and her other kids health. She is not, in general, able to say no to anyone who asks her for help and indeed offers and takes on more than she can manage. She agrees to help with other family member's requests on top of what she does for her own nuclear family. Body pain, especially in am, and headaches, and knot on neck. Patient is established unless otherwise noted. Above chief complaint and [place] obtained by this physician provider. Review of Systems   Constitutional: Negative for activity change, chills, fatigue and fever. HENT: Negative for congestion. Respiratory: Negative for cough, chest tightness, shortness of breath and wheezing. Cardiovascular: Negative for chest pain and leg swelling.    Gastrointestinal: Negative for visit on 07/02/19. Physical Exam   Constitutional: She is oriented to person, place, and time. She appears well-developed and well-nourished. No distress. HENT:   Head: Normocephalic. Mouth/Throat: Oropharynx is clear and moist.   Eyes: Pupils are equal, round, and reactive to light. Conjunctivae are normal. Right eye exhibits no discharge. Left eye exhibits no discharge. Neck: Normal range of motion. Cardiovascular: Normal rate, regular rhythm and normal heart sounds. No murmur heard. Pulmonary/Chest: Effort normal and breath sounds normal. No respiratory distress. She has no wheezes. She has no rales. Musculoskeletal: She exhibits tenderness (and circumscribed mass in right upper trapezius, mildy positive). Neurological: She is alert and oriented to person, place, and time. Skin: Skin is warm and dry. Rash (itchy rash) noted. She is not diaphoretic. Psychiatric: She has a normal mood and affect. Her behavior is normal.   Nursing note and vitals reviewed. _________________________________________________  Assessment:     Alexia Mcarthur was seen today for chronic pain, stress reaction and mass. Diagnoses and all orders for this visit:    Fibromyalgia  -     Milnacipran HCl 12.5 & 25 & 50 MG MISC; As directed  -     milnacipran HCl (SAVELLA) 50 MG TABS; Take 1 tablet by mouth 2 times daily FILL AFTER TITRATION PACK    Acute superficial gastritis without hemorrhage  -     pantoprazole (PROTONIX) 40 MG tablet; Take 1 tablet by mouth daily    Panic attacks  -     [place] - Hector Garcia, Melvin Saucedo PSYD, [place]    Rhus dermatitis  -     hydrocortisone (WESTCORT) 0.2 % cream; Apply topically 2 times daily. Other orders  -     ondansetron (ZOFRAN ODT) 4 MG disintegrating tablet;  Take 1 tablet by mouth every 8 hours as needed for Nausea or Vomiting      Problems listed above are stable except as follows:mood and fibromyalgia are not doing well, we decided savella might be worth a try if it is

## 2019-07-17 ENCOUNTER — HOSPITAL ENCOUNTER (OUTPATIENT)
Age: 39
Discharge: HOME | End: 2019-07-17
Payer: COMMERCIAL

## 2019-07-17 ENCOUNTER — OFFICE VISIT (OUTPATIENT)
Dept: FAMILY MEDICINE CLINIC | Age: 39
End: 2019-07-17
Payer: COMMERCIAL

## 2019-07-17 ENCOUNTER — HOSPITAL ENCOUNTER (OUTPATIENT)
Dept: GENERAL RADIOLOGY | Age: 39
Discharge: HOME | End: 2019-07-17
Payer: COMMERCIAL

## 2019-07-17 VITALS
SYSTOLIC BLOOD PRESSURE: 108 MMHG | BODY MASS INDEX: 41.95 KG/M2 | TEMPERATURE: 98.1 F | HEART RATE: 78 BPM | WEIGHT: 244.4 LBS | DIASTOLIC BLOOD PRESSURE: 62 MMHG | OXYGEN SATURATION: 99 %

## 2019-07-17 DIAGNOSIS — R10.13 EPIGASTRIC PAIN: ICD-10-CM

## 2019-07-17 DIAGNOSIS — R10.13 EPIGASTRIC PAIN: Primary | ICD-10-CM

## 2019-07-17 PROCEDURE — 99213 OFFICE O/P EST LOW 20 MIN: CPT | Performed by: NURSE PRACTITIONER

## 2019-07-17 PROCEDURE — 74018 RADEX ABDOMEN 1 VIEW: CPT

## 2019-07-17 RX ORDER — SUCRALFATE 1 G/1
1 TABLET ORAL 4 TIMES DAILY
Qty: 120 TABLET | Refills: 0 | Status: SHIPPED | OUTPATIENT
Start: 2019-07-17 | End: 2019-12-20

## 2019-07-17 ASSESSMENT — CROHNS DISEASE ACTIVITY INDEX (CDAI): CDAI SCORE: 0

## 2019-07-17 ASSESSMENT — ENCOUNTER SYMPTOMS
HEMATOCHEZIA: 0
CONSTIPATION: 0
DIARRHEA: 0
BELCHING: 1
VOMITING: 1
NAUSEA: 1
FLATUS: 0
ABDOMINAL PAIN: 1

## 2019-07-17 NOTE — PATIENT INSTRUCTIONS
We are committed to providing you the best care possible. If you receive a survey after visiting one of our offices, please take time to share your experience concerning your physician office visit. These surveys are confidential and no health information about you is shared. We are eager to improve for you and we are counting on your feedback to help make that happen. Patient Education        Abdominal Pain: Care Instructions  Your Care Instructions    Abdominal pain has many possible causes. Some aren't serious and get better on their own in a few days. Others need more testing and treatment. If your pain continues or gets worse, you need to be rechecked and may need more tests to find out what is wrong. You may need surgery to correct the problem. Don't ignore new symptoms, such as fever, nausea and vomiting, urination problems, pain that gets worse, and dizziness. These may be signs of a more serious problem. Your doctor may have recommended a follow-up visit in the next 8 to 12 hours. If you are not getting better, you may need more tests or treatment. The doctor has checked you carefully, but problems can develop later. If you notice any problems or new symptoms, get medical treatment right away. Follow-up care is a key part of your treatment and safety. Be sure to make and go to all appointments, and call your doctor if you are having problems. It's also a good idea to know your test results and keep a list of the medicines you take. How can you care for yourself at home? · Rest until you feel better. · To prevent dehydration, drink plenty of fluids, enough so that your urine is light yellow or clear like water. Choose water and other caffeine-free clear liquids until you feel better. If you have kidney, heart, or liver disease and have to limit fluids, talk with your doctor before you increase the amount of fluids you drink.   · If your stomach is upset, eat mild foods, such as rice, dry Instructions  Your Care Instructions  Sometimes it can be hard to pinpoint the cause of indigestion. (It is also called dyspepsia or heartburn.) Most cases of an upset stomach with bloating, burning, burping, and nausea are minor and go away within several hours. Home treatment and over-the-counter medicine often are able to control symptoms. But if you take medicine to relieve your indigestion without making diet and lifestyle changes, your symptoms are likely to return again and again. If you get indigestion often, it may be a sign of a more serious medical problem. Be sure to follow up with your doctor, who may want to do tests to be sure of the cause of your indigestion. Follow-up care is a key part of your treatment and safety. Be sure to make and go to all appointments, and call your doctor if you are having problems. It's also a good idea to know your test results and keep a list of the medicines you take. How can you care for yourself at home? · Your doctor may recommend over-the-counter medicine. For mild or occasional indigestion, antacids such as Gaviscon, Mylanta, Maalox, or Tums, may help. Be safe with medicines. Be careful when you take over-the-counter antacid medicines. Many of these medicines have aspirin in them. Read the label to make sure that you are not taking more than the recommended dose. Too much aspirin can be harmful. · Your doctor also may recommend over-the-counter acid reducers, such as Pepcid AC, Tagamet HB, Zantac 75, or Prilosec. Read and follow all instructions on the label. If you use these medicines often, talk with your doctor. · Change your eating habits. ? It's best to eat several small meals instead of two or three large meals. ? After you eat, wait 2 to 3 hours before you lie down. ? Chocolate, mint, and alcohol can make GERD worse. ? Spicy foods, foods that have a lot of acid (like tomatoes and oranges), and coffee can make GERD symptoms worse in some people.  If after you take sucralfate. Other drugs may interact with sucralfate, including prescription and over-the-counter medicines, vitamins, and herbal products. Tell each of your health care providers about all medicines you use now and any medicine you start or stop using. Where can I get more information? Your pharmacist can provide more information about sucralfate. Remember, keep this and all other medicines out of the reach of children, never share your medicines with others, and use this medication only for the indication prescribed. Every effort has been made to ensure that the information provided by [address] is accurate, up-to-date, and complete, but no guarantee is made to that effect. Drug information contained herein may be time sensitive. [facility] information has been compiled for use by healthcare practitioners and consumers in the [place] and therefore [facility] does not warrant that uses outside of the [place] are appropriate, unless specifically indicated otherwise. [facility]'s drug information does not endorse drugs, diagnose patients or recommend therapy. [facility] drug information is an informational resource designed to assist licensed healthcare practitioners in caring for their patients and/or to serve consumers viewing this service as a supplement to, and not a substitute for, the expertise, skill, knowledge and judgment of healthcare practitioners. The absence of a warning for a given drug or drug combination in no way should be construed to indicate that the drug or drug combination is safe, effective or appropriate for any given patient. [facility] does not assume any responsibility for any aspect of healthcare administered with the aid of information [facility] provides. The information contained herein is not intended to cover all possible uses, directions, precautions, warnings, drug interactions, allergic reactions, or adverse effects.  If you have questions about the

## 2019-07-18 ASSESSMENT — ENCOUNTER SYMPTOMS
CHEST TIGHTNESS: 0
COUGH: 0

## 2019-08-15 ENCOUNTER — TELEPHONE (OUTPATIENT)
Dept: FAMILY MEDICINE CLINIC | Age: 39
End: 2019-08-15

## 2019-08-15 DIAGNOSIS — M79.7 FIBROMYALGIA: ICD-10-CM

## 2019-08-15 NOTE — TELEPHONE ENCOUNTER
8- Patient was no call no show for todays appt. I called patient at home/cell patient had a scheduling conflict and forgot about this appt.   New appt scheduled for [email or web address]   (EV) First no show Letter mailed

## 2019-08-16 RX ORDER — TIZANIDINE 2 MG/1
TABLET ORAL
Qty: 30 TABLET | Refills: 5 | Status: SHIPPED | OUTPATIENT
Start: 2019-08-16 | End: 2019-12-20

## 2019-12-20 ENCOUNTER — OFFICE VISIT (OUTPATIENT)
Dept: FAMILY MEDICINE CLINIC | Age: 39
End: 2019-12-20
Payer: COMMERCIAL

## 2019-12-20 VITALS
BODY MASS INDEX: 40.29 KG/M2 | HEART RATE: 97 BPM | HEIGHT: 65 IN | WEIGHT: 241.8 LBS | TEMPERATURE: 98.5 F | DIASTOLIC BLOOD PRESSURE: 72 MMHG | OXYGEN SATURATION: 99 % | SYSTOLIC BLOOD PRESSURE: 122 MMHG

## 2019-12-20 DIAGNOSIS — K52.9 ACUTE GASTROENTERITIS: Primary | ICD-10-CM

## 2019-12-20 DIAGNOSIS — R68.89 FLU-LIKE SYMPTOMS: ICD-10-CM

## 2019-12-20 LAB
INFLUENZA VIRUS A RNA: NEGATIVE
INFLUENZA VIRUS B RNA: NEGATIVE

## 2019-12-20 PROCEDURE — 99213 OFFICE O/P EST LOW 20 MIN: CPT | Performed by: NURSE PRACTITIONER

## 2019-12-20 PROCEDURE — 87502 INFLUENZA DNA AMP PROBE: CPT | Performed by: NURSE PRACTITIONER

## 2019-12-20 RX ORDER — NAPROXEN SODIUM 220 MG
220 TABLET ORAL 2 TIMES DAILY PRN
Status: ON HOLD | COMMUNITY
End: 2020-02-05 | Stop reason: HOSPADM

## 2019-12-20 RX ORDER — PROCHLORPERAZINE MALEATE 10 MG
10 TABLET ORAL EVERY 6 HOURS PRN
Qty: 120 TABLET | Refills: 3 | Status: SHIPPED | OUTPATIENT
Start: 2019-12-20 | End: 2020-01-02

## 2019-12-20 ASSESSMENT — ENCOUNTER SYMPTOMS
VISUAL CHANGE: 0
RHINORRHEA: 0
ABDOMINAL DISTENTION: 0
SWOLLEN GLANDS: 0
CONSTIPATION: 0
VOMITING: 0
CHANGE IN BOWEL HABIT: 0
ABDOMINAL PAIN: 0
COUGH: 0
SINUS PAIN: 0
CHEST TIGHTNESS: 0
NAUSEA: 1
SHORTNESS OF BREATH: 0
SORE THROAT: 0
FLU SYMPTOMS: 1
DIARRHEA: 0
SINUS PRESSURE: 0

## 2020-01-02 ENCOUNTER — OFFICE VISIT (OUTPATIENT)
Dept: FAMILY MEDICINE CLINIC | Age: 40
End: 2020-01-02
Payer: COMMERCIAL

## 2020-01-02 VITALS
BODY MASS INDEX: 40.22 KG/M2 | RESPIRATION RATE: 14 BRPM | HEART RATE: 80 BPM | OXYGEN SATURATION: 98 % | SYSTOLIC BLOOD PRESSURE: 120 MMHG | DIASTOLIC BLOOD PRESSURE: 68 MMHG | HEIGHT: 65 IN | WEIGHT: 241.4 LBS

## 2020-01-02 PROCEDURE — 1036F TOBACCO NON-USER: CPT | Performed by: FAMILY MEDICINE

## 2020-01-02 PROCEDURE — G8417 CALC BMI ABV UP PARAM F/U: HCPCS | Performed by: FAMILY MEDICINE

## 2020-01-02 PROCEDURE — G8427 DOCREV CUR MEDS BY ELIG CLIN: HCPCS | Performed by: FAMILY MEDICINE

## 2020-01-02 PROCEDURE — 99214 OFFICE O/P EST MOD 30 MIN: CPT | Performed by: FAMILY MEDICINE

## 2020-01-02 PROCEDURE — G8484 FLU IMMUNIZE NO ADMIN: HCPCS | Performed by: FAMILY MEDICINE

## 2020-01-02 RX ORDER — PROPRANOLOL HYDROCHLORIDE 10 MG/1
10 TABLET ORAL 3 TIMES DAILY
Qty: 90 TABLET | Refills: 3 | Status: SHIPPED | OUTPATIENT
Start: 2020-01-02 | End: 2020-04-02

## 2020-01-02 RX ORDER — ONDANSETRON 4 MG/1
4 TABLET, ORALLY DISINTEGRATING ORAL EVERY 8 HOURS PRN
Qty: 30 TABLET | Refills: 1 | Status: SHIPPED | OUTPATIENT
Start: 2020-01-02 | End: 2020-02-13 | Stop reason: SDUPTHER

## 2020-01-02 RX ORDER — TIZANIDINE 4 MG/1
4 TABLET ORAL NIGHTLY
Qty: 30 TABLET | Refills: 5 | Status: SHIPPED | OUTPATIENT
Start: 2020-01-02 | End: 2020-10-12

## 2020-01-02 RX ORDER — PANTOPRAZOLE SODIUM 40 MG/1
40 TABLET, DELAYED RELEASE ORAL DAILY
Qty: 30 TABLET | Refills: 5 | Status: SHIPPED | OUTPATIENT
Start: 2020-01-02 | End: 2020-04-02 | Stop reason: SDUPTHER

## 2020-01-02 ASSESSMENT — PATIENT HEALTH QUESTIONNAIRE - PHQ9
SUM OF ALL RESPONSES TO PHQ9 QUESTIONS 1 & 2: 0
SUM OF ALL RESPONSES TO PHQ QUESTIONS 1-9: 0
SUM OF ALL RESPONSES TO PHQ QUESTIONS 1-9: 0
2. FEELING DOWN, DEPRESSED OR HOPELESS: 0
1. LITTLE INTEREST OR PLEASURE IN DOING THINGS: 0

## 2020-01-02 ASSESSMENT — ENCOUNTER SYMPTOMS
SHORTNESS OF BREATH: 0
ABDOMINAL PAIN: 1
BACK PAIN: 0
COUGH: 0
CHEST TIGHTNESS: 0
WHEEZING: 0

## 2020-01-02 NOTE — PROGRESS NOTES
Chief Complaint   Patient presents with    Abdominal Pain     pain higher in stomach causing vomiting     Rectal Bleeding     blood in stool last 3 weeks       [place] NARRATIVE:    IN ER at [facility] the [facility] for chest pain on 12/30. Emesis and body pain dating back several weeks, walk in clinic notes from 12/20 also reviewed along with ER data. Last seen by me in July with gastritis. She was referred then to counselling and did not attend. Has large number of personal stressors. WEIGHT and BP are stable. Has been told viral illness at [facility] and then at ER with recurrent chest pain (in epigastrium) and pressure and couldn't breathe. ER told her probably gastritis. She had been previously referred, she says and never scheduled due to her daughter's illness (with brain mass) and other family obligations. Daughter's health has recently been worse. Has recurrent anemia now with decreased red cell indices and mildly elevated AST. Has been taking aleve more frequently due to leg pains, takes OTC med as tylenol does not work. Has fibromyalgia but unable to take multiple medications. Patient is established unless otherwise noted. Above chief complaint and [place] obtained by this physician provider. Review of Systems   Constitutional: Negative for activity change, chills, fatigue and fever. HENT: Negative for congestion. Respiratory: Negative for cough, chest tightness, shortness of breath and wheezing. Cardiovascular: Negative for chest pain and leg swelling. Gastrointestinal: Positive for abdominal pain (epigastrium and chest; feels like needs to belch, worst episodes of pain go through to back). Musculoskeletal: Positive for myalgias. Negative for back pain. Skin: Negative for rash. Psychiatric/Behavioral: Positive for dysphoric mood. Negative for behavioral problems. Decreased concentration: increased worry about daughter's issues. The patient is nervous/anxious.         No Known Allergies  Allergy historyupdated. Outpatient Medications Marked as Taking for the 20 encounter (Office Visit) with Lola Zamudio MD   Medication Sig Dispense Refill    tiZANidine (ZANAFLEX) 4 MG tablet Take 1 tablet by mouth nightly 30 tablet 5    propranolol (INDERAL) 10 MG tablet Take 1 tablet by mouth 3 times daily 90 tablet 3    pantoprazole (PROTONIX) 40 MG tablet Take 1 tablet by mouth daily 30 tablet 5    ondansetron (ZOFRAN ODT) 4 MG disintegrating tablet Take 1 tablet by mouth every 8 hours as needed for Nausea or Vomiting 30 tablet 1    naproxen sodium (ALEVE) 220 MG tablet Take 220 mg by mouth 2 times daily (with meals)      Multiple Vitamins-Minerals (MULTI COMPLETE PO) Take by mouth         Tobacco use history updated. Social History     Tobacco Use   Smoking Status Former Smoker    Types: Cigarettes    Last attempt to quit: 1998    Years since quittin.5   Smokeless Tobacco Never Used        Nursing note reviewed. Vitals:    20 1341   BP: 120/68   Site: Right Upper Arm   Position: Sitting   Cuff Size: Large Adult   Pulse: 80   Resp: 14   SpO2: 98%   Weight: 241 lb 6.4 oz (109.5 kg)   Height: 5' 4.75\" (1.645 m)          BP Readings from Last 3 Encounters:   20 119/79   20 120/68   19 122/72     Wt Readings from Last 3 Encounters:   20 243 lb (110.2 kg)   20 241 lb 6.4 oz (109.5 kg)   19 241 lb 12.8 oz (109.7 kg)     Body mass index is 40.48 kg/m². No results found for this visit on 20. Physical Exam  Vitals signs and nursing note reviewed. Constitutional:       General: She is not in acute distress. Appearance: She is well-developed. She is not diaphoretic. HENT:      Head: Normocephalic. Eyes:      General:         Right eye: No discharge. Left eye: No discharge. Conjunctiva/sclera: Conjunctivae normal.      Pupils: Pupils are equal, round, and reactive to light.    Neck:      Musculoskeletal: Normal range of motion. Cardiovascular:      Rate and Rhythm: Normal rate and regular rhythm. Heart sounds: Normal heart sounds. No murmur. Pulmonary:      Effort: Pulmonary effort is normal. No respiratory distress. Breath sounds: Normal breath sounds. No wheezing or rales. Abdominal:      General: There is distension. Tenderness: There is tenderness (epigastrium). There is no guarding or rebound. Skin:     General: Skin is warm and dry. Neurological:      Mental Status: She is alert and oriented to person, place, and time. Psychiatric:         Behavior: Behavior normal.             _________________________________________________  Assessment:     Nabila Stovall was seen today for abdominal pain and rectal bleeding. Diagnoses and all orders for this visit:    Acute gastritis, presence of bleeding unspecified, unspecified gastritis type  -     [facility] - Rika Jeong CNP, Gastroenterology, [facility]    Rectal bleeding  -     Kemal Amin CNP, Gastroenterology, [place]    Anemia, unspecified type    Abnormal liver function test    Fibromyalgia  -     tiZANidine (ZANAFLEX) 4 MG tablet; Take 1 tablet by mouth nightly    Anxiety  -     propranolol (INDERAL) 10 MG tablet; Take 1 tablet by mouth 3 times daily    Acute superficial gastritis without hemorrhage  -     pantoprazole (PROTONIX) 40 MG tablet; Take 1 tablet by mouth daily    Nausea  -     ondansetron (ZOFRAN ODT) 4 MG disintegrating tablet; Take 1 tablet by mouth every 8 hours as needed for Nausea or Vomiting      Problems listed above are stable except as follows: Due to GI symptoms as above we will ask gastroenterology to evaluate her. For now we will put her on high-dose Protonix, provide Zofran as needed nausea. She is also strongly recommended to reduce NSAID use. She may take tizanidine for fibromyalgia.   Leave her personal stressors and frenetic lifestyle areas to self-care and improved chronic symptoms. Therapeutic plan is unchanged unless otherwise specified. See orders above and comments below for details of workup or medication orders. _________________________________________________  Plan:     Continue on PPI as prescribed. Refilled along with zofran, see GI for consult. Reduce stress and limit NSAIDS. Return in about 3 months (around 4/2/2020), or if symptoms worsen or fail to improve.       Electronically signed by Naa Moran MD on 1/2/20 at 1:53 PM

## 2020-01-08 ENCOUNTER — HOSPITAL ENCOUNTER (OUTPATIENT)
Age: 40
Discharge: HOME | End: 2020-01-08
Payer: COMMERCIAL

## 2020-01-08 ENCOUNTER — OFFICE VISIT (OUTPATIENT)
Dept: GASTROENTEROLOGY | Age: 40
End: 2020-01-08
Payer: COMMERCIAL

## 2020-01-08 ENCOUNTER — PREP FOR PROCEDURE (OUTPATIENT)
Dept: GASTROENTEROLOGY | Age: 40
End: 2020-01-08

## 2020-01-08 VITALS
DIASTOLIC BLOOD PRESSURE: 79 MMHG | BODY MASS INDEX: 40.48 KG/M2 | HEIGHT: 65 IN | WEIGHT: 243 LBS | RESPIRATION RATE: 15 BRPM | HEART RATE: 76 BPM | OXYGEN SATURATION: 98 % | SYSTOLIC BLOOD PRESSURE: 119 MMHG

## 2020-01-08 DIAGNOSIS — Z01.818 PREOP TESTING: Primary | ICD-10-CM

## 2020-01-08 LAB
BASOPHILS ABSOLUTE: 0 K/CU MM
BASOPHILS RELATIVE PERCENT: 0.5 % (ref 0–1)
DIFFERENTIAL TYPE: ABNORMAL
EOSINOPHILS ABSOLUTE: 0.1 K/CU MM
EOSINOPHILS RELATIVE PERCENT: 0.8 % (ref 0–3)
HCT VFR BLD CALC: 34.8 % (ref 37–47)
HEMOGLOBIN: 10.5 GM/DL (ref 12.5–16)
HIGH SENSITIVE C-REACTIVE PROTEIN: 0.9 MG/L
IMMATURE NEUTROPHIL %: 0.5 % (ref 0–0.43)
LYMPHOCYTES ABSOLUTE: 2.3 K/CU MM
LYMPHOCYTES RELATIVE PERCENT: 27.4 % (ref 24–44)
MCH RBC QN AUTO: 26.8 PG (ref 27–31)
MCHC RBC AUTO-ENTMCNC: 30.2 % (ref 32–36)
MCV RBC AUTO: 88.8 FL (ref 78–100)
MONOCYTES ABSOLUTE: 0.6 K/CU MM
MONOCYTES RELATIVE PERCENT: 7.4 % (ref 0–4)
NUCLEATED RBC %: 0 %
PDW BLD-RTO: 14.9 % (ref 11.7–14.9)
PLATELET # BLD: 389 K/CU MM (ref 140–440)
PMV BLD AUTO: 11 FL (ref 7.5–11.1)
RBC # BLD: 3.92 M/CU MM (ref 4.2–5.4)
SEGMENTED NEUTROPHILS ABSOLUTE COUNT: 5.3 K/CU MM
SEGMENTED NEUTROPHILS RELATIVE PERCENT: 63.4 % (ref 36–66)
TOTAL IMMATURE NEUTOROPHIL: 0.04 K/CU MM
TOTAL NUCLEATED RBC: 0 K/CU MM
WBC # BLD: 8.4 K/CU MM (ref 4–10.5)

## 2020-01-08 PROCEDURE — G8427 DOCREV CUR MEDS BY ELIG CLIN: HCPCS | Performed by: NURSE PRACTITIONER

## 2020-01-08 PROCEDURE — 85025 COMPLETE CBC W/AUTO DIFF WBC: CPT

## 2020-01-08 PROCEDURE — 83516 IMMUNOASSAY NONANTIBODY: CPT

## 2020-01-08 PROCEDURE — G8417 CALC BMI ABV UP PARAM F/U: HCPCS | Performed by: NURSE PRACTITIONER

## 2020-01-08 PROCEDURE — G8484 FLU IMMUNIZE NO ADMIN: HCPCS | Performed by: NURSE PRACTITIONER

## 2020-01-08 PROCEDURE — 36415 COLL VENOUS BLD VENIPUNCTURE: CPT

## 2020-01-08 PROCEDURE — 1036F TOBACCO NON-USER: CPT | Performed by: NURSE PRACTITIONER

## 2020-01-08 PROCEDURE — 99204 OFFICE O/P NEW MOD 45 MIN: CPT | Performed by: NURSE PRACTITIONER

## 2020-01-08 PROCEDURE — 86141 C-REACTIVE PROTEIN HS: CPT

## 2020-01-08 RX ORDER — POLYETHYLENE GLYCOL 3350 17 G/17G
238 POWDER, FOR SOLUTION ORAL ONCE
Qty: 1 BOTTLE | Refills: 0 | Status: SHIPPED | OUTPATIENT
Start: 2020-01-08 | End: 2020-01-08

## 2020-01-08 RX ORDER — SODIUM CHLORIDE 0.9 % (FLUSH) 0.9 %
10 SYRINGE (ML) INJECTION PRN
Status: CANCELLED | OUTPATIENT
Start: 2020-01-08

## 2020-01-08 RX ORDER — SODIUM CHLORIDE, SODIUM LACTATE, POTASSIUM CHLORIDE, CALCIUM CHLORIDE 600; 310; 30; 20 MG/100ML; MG/100ML; MG/100ML; MG/100ML
INJECTION, SOLUTION INTRAVENOUS CONTINUOUS
Status: CANCELLED | OUTPATIENT
Start: 2020-01-08

## 2020-01-08 RX ORDER — SODIUM CHLORIDE 0.9 % (FLUSH) 0.9 %
10 SYRINGE (ML) INJECTION EVERY 12 HOURS SCHEDULED
Status: CANCELLED | OUTPATIENT
Start: 2020-01-08

## 2020-01-08 ASSESSMENT — ENCOUNTER SYMPTOMS
ABDOMINAL PAIN: 1
BLOOD IN STOOL: 1
SHORTNESS OF BREATH: 1
EYE PAIN: 0
NAUSEA: 0
VOMITING: 0
COLOR CHANGE: 0
WHEEZING: 0
CONSTIPATION: 0
COUGH: 0
BACK PAIN: 0
DIARRHEA: 0

## 2020-01-08 NOTE — PROGRESS NOTES
for colonoscopy prep 1 Bottle 0    bisacodyl (BISACODYL) 5 MG EC tablet Take 4 tablets once for colonoscopy prep 4 tablet 0    tiZANidine (ZANAFLEX) 4 MG tablet Take 1 tablet by mouth nightly 30 tablet 5    propranolol (INDERAL) 10 MG tablet Take 1 tablet by mouth 3 times daily 90 tablet 3    pantoprazole (PROTONIX) 40 MG tablet Take 1 tablet by mouth daily 30 tablet 5    ondansetron (ZOFRAN ODT) 4 MG disintegrating tablet Take 1 tablet by mouth every 8 hours as needed for Nausea or Vomiting 30 tablet 1    naproxen sodium (ALEVE) 220 MG tablet Take 220 mg by mouth 2 times daily (with meals)      Multiple Vitamins-Minerals (MULTI COMPLETE PO) Take by mouth       No current facility-administered medications for this visit. Past medical history:   She has a past medical history of History of pneumonia and Pneumonia of right upper lobe due to infectious organism ([facility] [address] Past surgical history:  She has a past surgical history that includes Tubal ligation ();  section (06); and  section (11). Social History:  She reports that she quit smoking about 21 years ago. Her smoking use included cigarettes. She has never used smokeless tobacco. She reports current alcohol use. She reports that she does not use drugs. Family history:  Her family history includes Drug Abuse in her father; Mult Sclerosis in her sister. Objective    Vitals:    20 0947   BP: 119/79   Pulse: 76   Resp: 15   SpO2: 98%        Physical exam    Physical Exam  Vitals signs reviewed. Constitutional:       General: She is not in acute distress. Appearance: She is well-developed. She is obese. She is not ill-appearing, toxic-appearing or diaphoretic. HENT:      Head: Normocephalic and atraumatic. Eyes:      Conjunctiva/sclera: Conjunctivae normal.      Pupils: Pupils are equal, round, and reactive to light. Neck:      Musculoskeletal: Normal range of motion and neck supple.       Thyroid: No thyromegaly. Vascular: No JVD. Trachea: No tracheal deviation. Cardiovascular:      Rate and Rhythm: Normal rate and regular rhythm. Pulses: Normal pulses. Heart sounds: Normal heart sounds. No murmur. No friction rub. No gallop. Pulmonary:      Effort: No respiratory distress. Breath sounds: Normal breath sounds. No stridor. No wheezing, rhonchi or rales. Chest:      Chest wall: No tenderness. Abdominal:      General: Bowel sounds are normal. There is no distension. Palpations: Abdomen is soft. There is no mass. Tenderness: There is no tenderness. There is no guarding or rebound. Hernia: No hernia is present. Musculoskeletal: Normal range of motion. Lymphadenopathy:      Cervical: No cervical adenopathy. Skin:     General: Skin is warm and dry. Neurological:      Mental Status: She is alert and oriented to person, place, and time. Psychiatric:         Mood and Affect: Mood normal.         Office Visit on 12/20/2019   Component Date Value Ref Range Status    Influenza virus A RNA 12/20/2019 Negative   Final    Influenza virus B RNA 12/20/2019 Negative   Final       Assessment    ICD-10-CM    1. Epigastric pain R10.13 ESOPHAGOSCOPY / EGD   2. Gastroesophageal reflux disease, esophagitis presence not specified K21.9 ESOPHAGOSCOPY / EGD   3. Anemia, unspecified type D64.9 ESOPHAGOSCOPY / EGD     COLONOSCOPY (Diagnostic)     CBC Auto Differential     C-REACTIVE PROTEIN     Celiac AG IGA/IGG Screen w Reflex   4. Blood in stool K92.1 COLONOSCOPY (Diagnostic)             Plan  1. Will plan for an EGD/colonoscopy with MAC anesthesia. The patient was informed of the risks and benefits of the procedures. 2.  Epigastric pain that is intermittent most likely from gastritis, acid reflux, esophagitis or possible esophageal spasm. She has a history of NSAID induced gastric erosions. Recommend PPI twice daily. Recommend EGD to rule out peptic ulcer disease.   No report of hematemesis, coffee ground emesis or melena. She uses Aleve infrequently has been asked to stop taking NSAIDs to prevent risk for PUD. 3.  GERD without odynophagia or dysphagia. The patient was encouraged to begin taking Protonix twice daily for treatment of acid reflux and to see if this improves her symptoms. The patient was provided with information on acid reflux diet and triggers. Recommend weight loss, avoid eating late at night and avoid foods that trigger acid reflux. 4.  Anemia of unknown etiology. Will order CBC and celiac panel to rule out Celiac disease. Recommend EGD/colonoscopy to rule out GI bleeding. Recommend periodically monitoring H&H.    5.  Blood in stool most likely from hemorrhoids. Recommend colonoscopy to rule out inflammatory bowel disease, colon polyps or other source of GI bleeding. No active bright red bleeding or melena. The patient was provided with information on hemorrhoids and instructed to avoid sitting on the toilet for long periods of time and straining with bowel movements. 6.  Further recommendations for follow-up will be determined after the EGD/colonoscopy have been completed.

## 2020-01-10 LAB
IMMUNOGLOBULIN A, SERUM: 6 UNITS (ref 0–19)
IMMUNOGLOBULIN A, SERUM: NORMAL UNITS (ref 0–19)

## 2020-01-14 ENCOUNTER — TELEPHONE (OUTPATIENT)
Dept: FAMILY MEDICINE CLINIC | Age: 40
End: 2020-01-14

## 2020-01-14 NOTE — TELEPHONE ENCOUNTER
Pt appt had to be rescheduled due to provider not being available, pt states she needs to be seen today for headaches as they are severe, gave pt info to [facility] pt agreed to be seen there  and transferred her call to them to get scheduled.

## 2020-01-27 ENCOUNTER — OFFICE VISIT (OUTPATIENT)
Dept: FAMILY MEDICINE CLINIC | Age: 40
End: 2020-01-27
Payer: COMMERCIAL

## 2020-01-27 VITALS
DIASTOLIC BLOOD PRESSURE: 78 MMHG | BODY MASS INDEX: 39.74 KG/M2 | HEART RATE: 88 BPM | SYSTOLIC BLOOD PRESSURE: 124 MMHG | TEMPERATURE: 98.2 F | OXYGEN SATURATION: 98 % | WEIGHT: 238.8 LBS

## 2020-01-27 LAB
INFLUENZA A ANTIBODY: NEGATIVE
INFLUENZA B ANTIBODY: NEGATIVE

## 2020-01-27 PROCEDURE — 87804 INFLUENZA ASSAY W/OPTIC: CPT | Performed by: NURSE PRACTITIONER

## 2020-01-27 PROCEDURE — 99213 OFFICE O/P EST LOW 20 MIN: CPT | Performed by: NURSE PRACTITIONER

## 2020-01-27 ASSESSMENT — ENCOUNTER SYMPTOMS
SINUS PAIN: 0
CONSTIPATION: 0
DIARRHEA: 0
VOMITING: 0
ABDOMINAL PAIN: 0
COUGH: 1
NAUSEA: 0
TROUBLE SWALLOWING: 0
EYES NEGATIVE: 1
SORE THROAT: 0
SHORTNESS OF BREATH: 0
COLOR CHANGE: 0
RHINORRHEA: 0
WHEEZING: 0

## 2020-01-27 NOTE — PROGRESS NOTES
Subjective:      Patient ID: Raquel Hollingsworth is a 44 y.o. female. HPI  Pt presents today for nasal Congestion, cough, Headache, mucles aches that started yesterday AM. Coughing up clear thin sputum occasionally. Denies fever, chills, nausea, vomiting, diarrhea, ear pain, blurred vision. No sick contacts, but 3 young children in school whom are not currently ill. Reports she is taking tylenol around the clock which temporarily relieved her symptoms. Takes vitamin C at home, daily. Has not received her flu shot this season. Review of Systems   Constitutional: Negative for activity change, appetite change, chills, fatigue, fever and unexpected weight change. HENT: Positive for congestion. Negative for ear pain, hearing loss, postnasal drip, rhinorrhea, sinus pain, sore throat and trouble swallowing. Eyes: Negative. Respiratory: Positive for cough. Negative for shortness of breath and wheezing. Cardiovascular: Negative for chest pain and leg swelling. Gastrointestinal: Negative for abdominal pain, constipation, diarrhea, nausea and vomiting. Musculoskeletal: Positive for myalgias. Skin: Negative for color change and rash. Allergic/Immunologic: Negative for environmental allergies. Neurological: Positive for headaches. Negative for weakness and numbness. Psychiatric/Behavioral: Negative for confusion. Objective:   Physical Exam  Vitals signs reviewed. Constitutional:       General: She is not in acute distress. Appearance: She is not ill-appearing. HENT:      Head: Normocephalic and atraumatic. Right Ear: Tympanic membrane, ear canal and external ear normal.      Left Ear: Tympanic membrane, ear canal and external ear normal.      Nose: Nose normal.      Mouth/Throat:      Mouth: Mucous membranes are moist.      Pharynx: Oropharynx is clear. No oropharyngeal exudate or posterior oropharyngeal erythema.    Eyes:      Conjunctiva/sclera: Conjunctivae normal. Pupils: Pupils are equal, round, and reactive to light. Neck:      Musculoskeletal: Normal range of motion and neck supple. Cardiovascular:      Rate and Rhythm: Normal rate and regular rhythm. Pulses: Normal pulses. Heart sounds: Normal heart sounds. Pulmonary:      Effort: Pulmonary effort is normal.      Breath sounds: Normal breath sounds. Abdominal:      General: Bowel sounds are normal.      Palpations: Abdomen is soft. There is no mass. Musculoskeletal: Normal range of motion. Skin:     General: Skin is warm and dry. Neurological:      General: No focal deficit present. Mental Status: She is alert and oriented to person, place, and time. Psychiatric:         Mood and Affect: Mood normal.         Behavior: Behavior is cooperative. Thought Content: Thought content normal.       Vitals:    01/27/20 1033   BP: 124/78   Site: Left Upper Arm   Position: Sitting   Cuff Size: Medium Adult   Pulse: 88   Temp: 98.2 °F (36.8 °C)   TempSrc: Oral   SpO2: 98%   Weight: 238 lb 12.8 oz (108.3 kg)       No results found for this visit on 01/27/20. Assessment:       Viral URI  INFLUENZA NEGATIVE      Plan:      mucinex - decongestant  Claritin/ zyrtec/ benadryl for dying of secretions  Tessalon pearle- cough  Tylenol  as needed for pain/ fever. Rest, fluids, hand washing. Get flu shot ASAP when feeling better. Follow up in one week if symptoms are not improving.          MARIA LUZ Navas - NP

## 2020-01-27 NOTE — PATIENT INSTRUCTIONS
Rest, fluids, tylenol, ibuprofen, mucinex, claritin. Please follow up in a week if symptoms have not improved. Take care!!!           Patient Education        loratadine  Pronunciation:  armani AT a robin  Brand:  Alavert, Claritin, Claritin Reditab, Clear-Atadine, Dimetapp ND, ohm Allergy Relief, QlearQuil All Day & Night, Tavist ND, Wal-itin  What is the most important information I should know about loratadine? Follow all directions on your medicine label and package. Tell each of your healthcare providers about all your medical conditions, allergies, and all medicines you use. What is loratadine? Loratadine is an antihistamine that reduces the effects of natural chemical histamine in the body. Histamine can produce symptoms of sneezing, itching, watery eyes, and runny nose. Loratadine is used to treat sneezing, runny nose, watery eyes, hives, skin rash, itching, and other cold or allergy symptoms. Loratadine is also used to treat skin hives and itching in people with chronic skin reactions. Loratadine may also be used for purposes not listed in this medication guide. What should I discuss with my healthcare provider before taking loratadine? You should not take this medicine if you are allergic to loratadine or to desloratadine (Clarinex). Ask a doctor or pharmacist if it is safe for you to use this medicine if you have other medical conditions, especially:  · asthma;  · kidney disease; or  · liver disease. This medicine is not expected to harm an unborn baby. Tell your doctor if you are pregnant or plan to become pregnant. Loratadine can pass into breast milk and may harm a nursing baby. Tell your doctor if you are breast-feeding a baby. Some forms of loratadine may contain phenylalanine. Talk to your doctor before taking loratadine if you have phenylketonuria (PKU). Do not give this medicine to a child younger than 10years old without the advice of a doctor. How should I take loratadine?   Use assist licensed healthcare practitioners in caring for their patients and/or to serve consumers viewing this service as a supplement to, and not a substitute for, the expertise, skill, knowledge and judgment of healthcare practitioners. The absence of a warning for a given drug or drug combination in no way should be construed to indicate that the drug or drug combination is safe, effective or appropriate for any given patient. [facility] does not assume any responsibility for any aspect of healthcare administered with the aid of information [facility] provides. The information contained herein is not intended to cover all possible uses, directions, precautions, warnings, drug interactions, allergic reactions, or adverse effects. If you have questions about the drugs you are taking, check with your doctor, nurse or pharmacist.  Copyright 2604-6439 [address]. Version: 9.02. Revision date: 2/20/2015. Care instructions adapted under license by [facility] ([facility]). If you have questions about a medical condition or this instruction, always ask your healthcare professional. [place] [address] any warranty or liability for your use of this information. Patient Education        Influenza (Flu): Care Instructions  Your Care Instructions    Influenza (flu) is an infection in the lungs and breathing passages. It is caused by the influenza virus. There are different strains, or types, of the flu virus from year to year. Unlike the common cold, the flu comes on suddenly and the symptoms, such as a cough, congestion, fever, chills, fatigue, aches, and pains, are more severe. These symptoms may last up to 10 days. Although the flu can make you feel very sick, it usually doesn't cause serious health problems. Home treatment is usually all you need for flu symptoms. But your doctor may prescribe antiviral medicine to prevent other health problems, such as pneumonia, from developing.  Older people and those who have your doctor if you think you are having a problem with your medicine. To avoid spreading the flu  · Wash your hands regularly, and keep your hands away from your face. · Stay home from school, work, and other public places until you are feeling better and your fever has been gone for at least 24 hours. The fever needs to have gone away on its own without the help of medicine. · Ask people living with you to talk to their doctors about preventing the flu. They may get antiviral medicine to keep from getting the flu from you. · To prevent the flu in the future, get a flu vaccine every fall. Encourage people living with you to get the vaccine. · Cover your mouth when you cough or sneeze. When should you call for help? Call 911 anytime you think you may need emergency care. For example, call if:    · You have severe trouble breathing.    Call your doctor now or seek immediate medical care if:    · You have new or worse trouble breathing.     · You seem to be getting much sicker.     · You feel very sleepy or confused.     · You have a new or higher fever.     · You get a new rash.    Watch closely for changes in your health, and be sure to contact your doctor if:    · You begin to get better and then get worse.     · You are not getting better after 1 week. Where can you learn more? Go to [email or web address]://VDI Space.[employer]Defender. org and sign in to your OLX account. Enter R633 in the Acumatica box to learn more about \"Influenza (Flu): Care Instructions. \"     If you do not have an account, please click on the \"Sign Up Now\" link. Current as of: June 9, 2019  Content Version: 12.3  © 9381-2183 Healthwise, Incorporated. Care instructions adapted under license by [facility]. If you have questions about a medical condition or this instruction, always ask your healthcare professional. [address] any warranty or liability for your use of this information.

## 2020-02-04 ENCOUNTER — ANESTHESIA EVENT (OUTPATIENT)
Dept: OPERATING ROOM | Age: 40
End: 2020-02-04
Payer: COMMERCIAL

## 2020-02-04 ASSESSMENT — ENCOUNTER SYMPTOMS
COUGH: 0
EYE PAIN: 0
CONSTIPATION: 0
WHEEZING: 0
SHORTNESS OF BREATH: 1
BLOOD IN STOOL: 1
VOMITING: 0
DIARRHEA: 0
NAUSEA: 0
COLOR CHANGE: 0
BACK PAIN: 0
ABDOMINAL PAIN: 1

## 2020-02-05 ENCOUNTER — HOSPITAL ENCOUNTER (OUTPATIENT)
Age: 40
Setting detail: OUTPATIENT SURGERY
Discharge: HOME | End: 2020-02-05
Attending: SURGERY | Admitting: SURGERY
Payer: COMMERCIAL

## 2020-02-05 ENCOUNTER — ANESTHESIA (OUTPATIENT)
Dept: OPERATING ROOM | Age: 40
End: 2020-02-05
Payer: COMMERCIAL

## 2020-02-05 VITALS
WEIGHT: 232 LBS | SYSTOLIC BLOOD PRESSURE: 105 MMHG | RESPIRATION RATE: 16 BRPM | DIASTOLIC BLOOD PRESSURE: 65 MMHG | OXYGEN SATURATION: 99 % | HEART RATE: 68 BPM | HEIGHT: 63 IN | BODY MASS INDEX: 41.11 KG/M2 | TEMPERATURE: 98 F

## 2020-02-05 VITALS — SYSTOLIC BLOOD PRESSURE: 100 MMHG | OXYGEN SATURATION: 97 % | DIASTOLIC BLOOD PRESSURE: 67 MMHG

## 2020-02-05 PROCEDURE — 7100000011 HC PHASE II RECOVERY - ADDTL 15 MIN: Performed by: SURGERY

## 2020-02-05 PROCEDURE — 3700000001 HC ADD 15 MINUTES (ANESTHESIA): Performed by: SURGERY

## 2020-02-05 PROCEDURE — 2580000003 HC RX 258: Performed by: NURSE PRACTITIONER

## 2020-02-05 PROCEDURE — 3609012400 HC EGD TRANSORAL BIOPSY SINGLE/MULTIPLE: Performed by: SURGERY

## 2020-02-05 PROCEDURE — 2709999900 HC NON-CHARGEABLE SUPPLY: Performed by: SURGERY

## 2020-02-05 PROCEDURE — 7100000010 HC PHASE II RECOVERY - FIRST 15 MIN: Performed by: SURGERY

## 2020-02-05 PROCEDURE — 88305 TISSUE EXAM BY PATHOLOGIST: CPT

## 2020-02-05 PROCEDURE — 88342 IMHCHEM/IMCYTCHM 1ST ANTB: CPT

## 2020-02-05 PROCEDURE — 6360000002 HC RX W HCPCS: Performed by: NURSE ANESTHETIST, CERTIFIED REGISTERED

## 2020-02-05 PROCEDURE — 45380 COLONOSCOPY AND BIOPSY: CPT | Performed by: SURGERY

## 2020-02-05 PROCEDURE — 43239 EGD BIOPSY SINGLE/MULTIPLE: CPT | Performed by: SURGERY

## 2020-02-05 PROCEDURE — 2500000003 HC RX 250 WO HCPCS: Performed by: NURSE ANESTHETIST, CERTIFIED REGISTERED

## 2020-02-05 PROCEDURE — 3609010600 HC COLONOSCOPY POLYPECTOMY SNARE/COLD BIOPSY: Performed by: SURGERY

## 2020-02-05 PROCEDURE — 3700000000 HC ANESTHESIA ATTENDED CARE: Performed by: SURGERY

## 2020-02-05 RX ORDER — PROPOFOL 10 MG/ML
INJECTION, EMULSION INTRAVENOUS PRN
Status: DISCONTINUED | OUTPATIENT
Start: 2020-02-05 | End: 2020-02-05 | Stop reason: SDUPTHER

## 2020-02-05 RX ORDER — SODIUM CHLORIDE 0.9 % (FLUSH) 0.9 %
10 SYRINGE (ML) INJECTION PRN
Status: DISCONTINUED | OUTPATIENT
Start: 2020-02-05 | End: 2020-02-05 | Stop reason: HOSPADM

## 2020-02-05 RX ORDER — SODIUM CHLORIDE, SODIUM LACTATE, POTASSIUM CHLORIDE, CALCIUM CHLORIDE 600; 310; 30; 20 MG/100ML; MG/100ML; MG/100ML; MG/100ML
INJECTION, SOLUTION INTRAVENOUS CONTINUOUS
Status: DISCONTINUED | OUTPATIENT
Start: 2020-02-05 | End: 2020-02-05 | Stop reason: HOSPADM

## 2020-02-05 RX ORDER — MIDAZOLAM HYDROCHLORIDE 1 MG/ML
INJECTION INTRAMUSCULAR; INTRAVENOUS PRN
Status: DISCONTINUED | OUTPATIENT
Start: 2020-02-05 | End: 2020-02-05 | Stop reason: SDUPTHER

## 2020-02-05 RX ORDER — LIDOCAINE HYDROCHLORIDE 20 MG/ML
INJECTION, SOLUTION EPIDURAL; INFILTRATION; INTRACAUDAL; PERINEURAL PRN
Status: DISCONTINUED | OUTPATIENT
Start: 2020-02-05 | End: 2020-02-05 | Stop reason: SDUPTHER

## 2020-02-05 RX ORDER — PROPOFOL 10 MG/ML
INJECTION, EMULSION INTRAVENOUS PRN
Status: DISCONTINUED | OUTPATIENT
Start: 2020-02-05 | End: 2020-02-05

## 2020-02-05 RX ORDER — SODIUM CHLORIDE 0.9 % (FLUSH) 0.9 %
10 SYRINGE (ML) INJECTION EVERY 12 HOURS SCHEDULED
Status: DISCONTINUED | OUTPATIENT
Start: 2020-02-05 | End: 2020-02-05 | Stop reason: HOSPADM

## 2020-02-05 RX ADMIN — PROPOFOL 730 MG: 10 INJECTION, EMULSION INTRAVENOUS at 10:56

## 2020-02-05 RX ADMIN — SODIUM CHLORIDE, POTASSIUM CHLORIDE, SODIUM LACTATE AND CALCIUM CHLORIDE: 600; 310; 30; 20 INJECTION, SOLUTION INTRAVENOUS at 09:46

## 2020-02-05 RX ADMIN — LIDOCAINE HYDROCHLORIDE 100 MG: 20 INJECTION, SOLUTION EPIDURAL; INFILTRATION; INTRACAUDAL; PERINEURAL at 10:56

## 2020-02-05 RX ADMIN — MIDAZOLAM 2 MG: 1 INJECTION INTRAMUSCULAR; INTRAVENOUS at 10:50

## 2020-02-05 ASSESSMENT — PAIN - FUNCTIONAL ASSESSMENT: PAIN_FUNCTIONAL_ASSESSMENT: 0-10

## 2020-02-05 NOTE — DISCHARGE INSTR - COC
Continuity of Care Form    Patient Name: Sophie Mixon   :  1980  MRN:  [id number]    Admit date:  2020  Discharge date:  ***    Code Status Order: Full Code   Advance Directives:   Advance Care Flowsheet Documentation     Date/Time Healthcare Directive Type of Healthcare Directive Copy in [address] Agent's Name Healthcare Agent's Phone Number    20 1112  No, patient does not have an advance directive for healthcare treatment -- -- -- -- --          Admitting Physician:  Hilda Estrada MD  PCP: Rosanne Gibson MD    Discharging Nurse: [facility] Unit/Room#: ASC-OR/NONE  Discharging Unit Phone Number: ***    Emergency Contact:   Extended Emergency Contact Information  Primary Emergency Contact: Eliot Adams  Address: [address] [place] [address], [address] [place] [address] Phone: [phone]  Mobile Phone: [phone]  Relation: Spouse  Secondary Emergency Contact: [facility] Phone: [phone]  Relation: Parent    Past Surgical History:  Past Surgical History:   Procedure Laterality Date     SECTION  06     SECTION  11    TUBAL LIGATION      UPPER GASTROINTESTINAL ENDOSCOPY         Immunization History:   Immunization History   Administered Date(s) Administered    DTaP 2011    Influenza, Tyra Shobha, IM, PF (6 mo and older Fluzone, Flulaval, Fluarix, and 3 yrs and older Afluria) 10/29/2018    Tdap (Boostrix, Adacel) 2011       Active Problems:  Patient Active Problem List   Diagnosis Code    Family history of multiple sclerosis Z82.0    Chronic nonintractable headache R51    Dysmenorrhea N94.6    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, 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       Isolation/Infection:   Isolation          No Isolation        Patient Infection Status     None to display          Nurse Assessment:  Last Vital Signs: /60   Pulse 79   Temp 98.5 °F (36.9 °C) (Temporal)   Resp 16 Ht 5' 3\" (1.6 m)   Wt 232 lb (105.2 kg)   LMP 2020   SpO2 99%   BMI 41.10 kg/m²     Last documented pain score (0-10 scale): Pain Level: 0  Last Weight:   Wt Readings from Last 1 Encounters:   20 232 lb (105.2 kg)     Mental Status:  {IP PT MENTAL STATUS:96053}    IV Access:  { GORDY IV ACCESS:188064013}    Nursing Mobility/ADLs:  Walking   {CHP DME JVLO:053903376}  Transfer  {CHP DME PKYD:532391393}  Bathing  {CHP DME GTWD:058310519}  Dressing  {CHP DME BPZE:966951418}  Toileting  {CHP DME GJJO:660309123}  Feeding  {CHP DME VQVH:980133970}  Med Admin  {CHP DME IPIQ:219353934}  Med Delivery   { GORDY MED Delivery:025534463}    Wound Care Documentation and Therapy:        Elimination:  Continence:   · Bowel: {YES / OC:31535}  · Bladder: {YES / PY:11360}  Urinary Catheter: {Urinary Catheter:126067576}   Colostomy/Ileostomy/Ileal Conduit: {YES / JF:01470}       Date of Last BM: ***  No intake or output data in the 24 hours ending 20 0956  No intake/output data recorded.     Safety Concerns:     [address] Safety Concerns:145624917}    Impairments/Disabilities:      [address] Impairments/Disabilities:387280470}    Nutrition Therapy:  Current Nutrition Therapy:   [address] Diet List:721761467}    Routes of Feeding: {CHP DME Other Feedings:875236704}  Liquids: {Slp liquid thickness:26038}  Daily Fluid Restriction: {CHP DME Yes amt example:441453443}  Last Modified Barium Swallow with Video (Video Swallowing Test): {Done Not Done TOAO:316582033}    Treatments at the Time of Hospital Discharge:   Respiratory Treatments: ***  Oxygen Therapy:  {Therapy; copd oxygen:14154}  Ventilator:    {[facility] Vent OZX}    Rehab Therapies: {THERAPEUTIC INTERVENTION:6527705130}  Weight Bearing Status/Restrictions: [address]  Weight Bearin}  Other Medical Equipment (for information only, NOT a DME order):  {EQUIPMENT:612938425}  Other Treatments: ***    Patient's personal belongings (please select all that are

## 2020-02-05 NOTE — ANESTHESIA POSTPROCEDURE EVALUATION
Department of Anesthesiology  Postprocedure Note    Patient: Mu Joseph  MRN: [id number]  YOB: 1980  Date of evaluation: 2/5/2020  Time:  11:41 AM     Procedure Summary     Date:  02/05/20 Room / Location:  [address]    Anesthesia Start:  1109 Anesthesia Stop:  4200    Procedures:       COLONOSCOPY DIAGNOSTIC (N/A )      EGD BIOPSY (N/A ) Diagnosis:  (R10.13 Epigastric Pain, D64.9 Anemia)    Surgeon:  Deb Ennis MD Responsible Provider:  MARIA LUZ Sidhu CRNA    Anesthesia Type:  MAC ASA Status:  2          Anesthesia Type: MAC    Papito Phase I:      Papito Phase II:      Last vitals: Reviewed and per EMR flowsheets.        Anesthesia Post Evaluation    Patient location during evaluation: bedside  Patient participation: complete - patient participated  Level of consciousness: awake  Pain score: 0  Airway patency: patent  Nausea & Vomiting: no nausea and no vomiting  Complications: no  Cardiovascular status: hemodynamically stable  Respiratory status: acceptable  Hydration status: euvolemic

## 2020-02-05 NOTE — H&P
GENERAL SURGERY OUTPATIENT HISTORY & PHYSICAL NOTE - ENDOSCOPY  [facility]    PATIENT: Artem Juárez, 1980, 44 y.o., female  MRN: [id number]    Physician: Lynda Camacho MD    Date: 20    Reason for Evaluation:  EGD and colonoscopy     Requesting Physician:  Arline Gaucher, APRN-CNP     CHIEF COMPLAINT:  Need for EGD and colonoscopy     History Obtained From:  patient, electronic medical record    HISTORY OF PRESENT ILLNESS:    Mu Joseph is a 44 y.o. female presenting for EGD and colonoscopy. She has been having acid reflux, anemia, blood in stool and epigastric pain    She has completed the bowel prep. Workup Includes:    Previous EGD: 2018 by Dr. Roselia Arango at [facility] revealing gastric erosions, gastropathy; several erosions in the distal part of the body of the stomach revealed NSAID long term use   Previous colonoscopy (or flexible sigmoidoscopy): No    Of Note:    Family history of colon cancer: No   Taking anticoagulants: No. Held for the procedure today N/A    Past Medical History:    Past Medical History:   Diagnosis Date    History of pneumonia     Pneumonia of right upper lobe due to infectious organism ([facility] [address] 2016       Past Surgical History:    Past Surgical History:   Procedure Laterality Date     SECTION  06     SECTION  11    TUBAL LIGATION      UPPER GASTROINTESTINAL ENDOSCOPY         Current Medications:   Current Facility-Administered Medications   Medication Dose Route Frequency Provider Last Rate Last Dose    lactated ringers infusion   Intravenous Continuous [place] Hof, APRN - CNP 75 mL/hr at 20 0946      sodium chloride flush 0.9 % injection 10 mL  10 mL Intravenous 2 times per day [place] Hof, APRN - CNP        sodium chloride flush 0.9 % injection 10 mL  10 mL Intravenous PRN [place] Hof, APRN - CNP           Allergies:  Patient has no known allergies.     Social History:   Social History     Socioeconomic History    Marital status:      Spouse name: None    Number of children: None    Years of education: None    Highest education level: None   Occupational History    None   Social Needs    Financial resource strain: None    Food insecurity:     Worry: None     Inability: None    Transportation needs:     Medical: None     Non-medical: None   Tobacco Use    Smoking status: Former Smoker     Types: Cigarettes     Last attempt to quit: 1998     Years since quittin.5    Smokeless tobacco: Never Used   Substance and Sexual Activity    Alcohol use: Yes     Alcohol/week: 0.0 standard drinks     Comment: occ, caffeine-4 servings daily average    Drug use: No    Sexual activity: Yes     Partners: Male     Comment:    Lifestyle    Physical activity:     Days per week: None     Minutes per session: None    Stress: None   Relationships    Social connections:     Talks on phone: None     Gets together: None     Attends Congregational service: None     Active member of club or organization: None     Attends meetings of clubs or organizations: None     Relationship status: None    Intimate partner violence:     Fear of current or ex partner: None     Emotionally abused: None     Physically abused: None     Forced sexual activity: None   Other Topics Concern    None   Social History Narrative    None       Family History:   Family History   Problem Relation Age of Onset    Drug Abuse Father     Mult Sclerosis Sister        REVIEW OF SYSTEMS:    Review of Systems   Constitutional: Negative for appetite change, chills, fatigue, fever and unexpected weight change. HENT: Positive for tinnitus. Negative for ear pain and hearing loss. Eyes: Negative for pain and visual disturbance. Respiratory: Positive for shortness of breath. Negative for cough and wheezing. Cardiovascular: Negative for chest pain, palpitations and leg swelling. Gastrointestinal: Positive for abdominal pain and blood in stool. Negative for constipation, diarrhea, nausea and vomiting. Endocrine: Negative for cold intolerance and heat intolerance. Genitourinary: Negative for dysuria, frequency and urgency. Musculoskeletal: Positive for myalgias and neck pain. Negative for back pain. Fibromyalgia   Skin: Negative for color change, pallor and rash. Allergic/Immunologic: Negative for environmental allergies and food allergies. Neurological: Positive for headaches. Negative for dizziness and seizures. Hematological: Does not bruise/bleed easily. Psychiatric/Behavioral: Negative for dysphoric mood, sleep disturbance and suicidal ideas. The patient is nervous/anxious. I have reviewed the patient's information pertinent to this visit, including medical history, family history, social history and review of systems. PHYSICAL EXAM:    Vitals:    02/03/20 1108 02/05/20 0931   BP:  110/60   Pulse:  79   Resp:  16   Temp:  98.5 °F (36.9 °C)   TempSrc:  Temporal   SpO2:  99%   Weight: 243 lb (110.2 kg) 232 lb (105.2 kg)   Height: 5' 3\" (1.6 m)      Physical Exam  Constitutional:       General: She is not in acute distress. Appearance: She is well-developed. She is not diaphoretic. HENT:      Head: Normocephalic and atraumatic. Eyes:      General:         Right eye: No discharge. Left eye: No discharge. Pupils: Pupils are equal, round, and reactive to light. Neck:      Musculoskeletal: Neck supple. Trachea: No tracheal deviation. Cardiovascular:      Rate and Rhythm: Normal rate and regular rhythm. Pulmonary:      Effort: Pulmonary effort is normal. No respiratory distress. Breath sounds: No wheezing. Abdominal:      General: There is no distension. Palpations: Abdomen is soft. Tenderness: There is abdominal tenderness. There is no guarding (mild epigastric) or rebound.    Musculoskeletal:         General: No

## 2020-02-05 NOTE — PROGRESS NOTES
Pt stated she has been on diet drinking only protein shakes and eating no food for approx 1 month
picture ID,  insurance card, paperwork from the doctors office    (H & P, Consent, & card for implantable devices).                                                                                                                                                    Rise Art

## 2020-02-07 NOTE — RESULT ENCOUNTER NOTE
EGD showed mild inflammation and a tiny hiatal hernia. Recommend continued anti-reflux measures. Colonoscopy biopsies were negative for colitis. Polyp was hyperplastic. Recommend repeat colonoscopy in 10 years. Thanks!

## 2020-02-13 ENCOUNTER — OFFICE VISIT (OUTPATIENT)
Dept: GASTROENTEROLOGY | Age: 40
End: 2020-02-13
Payer: COMMERCIAL

## 2020-02-13 VITALS
BODY MASS INDEX: 39.95 KG/M2 | RESPIRATION RATE: 15 BRPM | OXYGEN SATURATION: 98 % | HEIGHT: 64 IN | DIASTOLIC BLOOD PRESSURE: 80 MMHG | HEART RATE: 86 BPM | WEIGHT: 234 LBS | SYSTOLIC BLOOD PRESSURE: 118 MMHG

## 2020-02-13 PROCEDURE — G8417 CALC BMI ABV UP PARAM F/U: HCPCS | Performed by: NURSE PRACTITIONER

## 2020-02-13 PROCEDURE — G8427 DOCREV CUR MEDS BY ELIG CLIN: HCPCS | Performed by: NURSE PRACTITIONER

## 2020-02-13 PROCEDURE — 1036F TOBACCO NON-USER: CPT | Performed by: NURSE PRACTITIONER

## 2020-02-13 PROCEDURE — G8484 FLU IMMUNIZE NO ADMIN: HCPCS | Performed by: NURSE PRACTITIONER

## 2020-02-13 PROCEDURE — 99215 OFFICE O/P EST HI 40 MIN: CPT | Performed by: NURSE PRACTITIONER

## 2020-02-13 RX ORDER — ONDANSETRON 4 MG/1
4 TABLET, ORALLY DISINTEGRATING ORAL EVERY 8 HOURS PRN
Qty: 30 TABLET | Refills: 3 | Status: SHIPPED | OUTPATIENT
Start: 2020-02-13 | End: 2020-05-04 | Stop reason: SDUPTHER

## 2020-02-13 ASSESSMENT — ENCOUNTER SYMPTOMS
ABDOMINAL PAIN: 0
BLOOD IN STOOL: 0
COLOR CHANGE: 0
WHEEZING: 0
NAUSEA: 0
COUGH: 0
VOMITING: 0
CONSTIPATION: 0
SHORTNESS OF BREATH: 1
BACK PAIN: 0
DIARRHEA: 0
EYE PAIN: 0

## 2020-02-13 NOTE — PROGRESS NOTES
alert and oriented to person, place, and time.    Psychiatric:         Mood and Affect: Mood normal.         Office Visit on 01/27/2020   Component Date Value Ref Range Status    Influenza A Ab 01/27/2020 negative   Final    Influenza B Ab 01/27/2020 negative   Final   Hospital Outpatient Visit on 01/08/2020   Component Date Value Ref Range Status    CRP, High Sensitivity 01/08/2020 0.9  mg/L Final    Comment: Cardiovascular risk  evaluation guidelines  Low Risk         <1.0 mg/L  Average Risk     1.0-3.0 mg/L  High Risk        >3.0 mg/L      WBC 01/08/2020 8.4  4.0 - 10.5 K/CU MM Final    RBC 01/08/2020 3.92* 4.2 - 5.4 M/CU MM Final    Hemoglobin 01/08/2020 10.5* 12.5 - 16.0 GM/DL Final    Hematocrit 01/08/2020 34.8* 37 - 47 % Final    MCV 01/08/2020 88.8  78 - 100 FL Final    MCH 01/08/2020 26.8* 27 - 31 PG Final    MCHC 01/08/2020 30.2* 32.0 - 36.0 % Final    RDW 01/08/2020 14.9  11.7 - 14.9 % Final    Platelets 46/41/7082 389  140 - 440 K/CU MM Final    MPV 01/08/2020 11.0  7.5 - 11.1 FL Final    Differential Type 01/08/2020 AUTOMATED DIFFERENTIAL   Final    Segs Relative 01/08/2020 63.4  36 - 66 % Final    Lymphocytes % 01/08/2020 27.4  24 - 44 % Final    Monocytes % 01/08/2020 7.4* 0 - 4 % Final    Eosinophils % 01/08/2020 0.8  0 - 3 % Final    Basophils % 01/08/2020 0.5  0 - 1 % Final    Segs Absolute 01/08/2020 5.3  K/CU MM Final    Lymphocytes Absolute 01/08/2020 2.3  K/CU MM Final    Monocytes Absolute 01/08/2020 0.6  K/CU MM Final    Eosinophils Absolute 01/08/2020 0.1  K/CU MM Final    Basophils Absolute 01/08/2020 0.0  K/CU MM Final    Nucleated RBC % 01/08/2020 0.0  % Final    Total Nucleated RBC 01/08/2020 0.0  K/CU MM Final    Total Immature Neutrophil 01/08/2020 0.04  K/CU MM Final    Immature Neutrophil % 01/08/2020 0.5* 0 - 0.43 % Final    Immunoglobulin A,Serum 01/08/2020 6  0 - 19 Units Final    Immunoglobulin A,Serum 01/08/2020   0 - 19 Units Final Value: (NOTE)  No further celiac testing to be performed. INTERPRETIVE INFORMATION: Celiac Disease Dual Antigen Screen  19 Units or less. ... Vinny Quiros Negative - No significant level of                       detectable IgA or IgG antibodies                       against human tissue transglutaminase                       or gliadin peptide. 20 Units or greater. Vinny Quiros Positive - Presence of IgA and/or                       IgG antibodies against human tissue                       transglutaminase and/or gliadin                       peptide; suggests possibility of                       certain gluten sensitive enteropathies                       such as celiac disease and dermatitis                       herpetiformis. Performed by [address], [address] [facility] [phone]  www. Oumar Herbert MD, Lab. Director     Office Visit on 12/20/2019   Component Date Value Ref Range Status    Influenza virus A RNA 12/20/2019 Negative   Final    Influenza virus B RNA 12/20/2019 Negative   Final       Assessment    ICD-10-CM    1. Gastroesophageal reflux disease with esophagitis K21.0    2. Nausea R11.0 ondansetron (ZOFRAN ODT) 4 MG disintegrating tablet   3. Epigastric pain R10.13    4. Anemia, unspecified type D64.9              Plan    1. Epigastric pain that is intermittent most likely from gastritis, acid reflux, esophagitis or possible esophageal spasm. She has a history of NSAID induced gastric erosions. The patient was encouraged to continue taking Protonix daily. Her recent EGD revealed mild chronic gastritis with no evidence of H. Pylori infection. Her GEJ biopsies revealed mild chronic inflammation with no evidence of Schreiber's esophagus. Her EGD revealed chronic gastritis with no evidence of peptic ulcer disease. The patient has been advised to avoid NSAIDs. 2.  GERD without odynophagia or dysphagia.   The patient was encouraged to continue taking Protonix daily for treatment of acid reflux. The patient was provided with information on acid reflux diet and triggers. Recommend weight loss, avoid eating late at night and avoid foods that trigger acid reflux. 3.  Chronic intermittent nausea most likely from gastritis, diet or medication related. The patient was encouraged to take Zofran as needed. 4.  Anemia of unknown etiology. Her CBC revealed RBC 3.92, Hgb 10.5, Hct 34.8 and Platelets 705. Her celiac panel was normal with no evidence of Celiac disease. No evidence of GI bleeding. Avoid NSAIDs. Recommend periodic monitoring of H&H.    5.  The patient was encouraged to follow-up in 3 months.

## 2020-02-24 ENCOUNTER — TELEPHONE (OUTPATIENT)
Dept: FAMILY MEDICINE CLINIC | Age: 40
End: 2020-02-24

## 2020-04-02 ENCOUNTER — VIRTUAL VISIT (OUTPATIENT)
Dept: FAMILY MEDICINE CLINIC | Age: 40
End: 2020-04-02
Payer: COMMERCIAL

## 2020-04-02 PROCEDURE — 99214 OFFICE O/P EST MOD 30 MIN: CPT | Performed by: FAMILY MEDICINE

## 2020-04-02 PROCEDURE — G8428 CUR MEDS NOT DOCUMENT: HCPCS | Performed by: FAMILY MEDICINE

## 2020-04-02 RX ORDER — PANTOPRAZOLE SODIUM 40 MG/1
40 TABLET, DELAYED RELEASE ORAL 2 TIMES DAILY
Qty: 60 TABLET | Refills: 5 | Status: SHIPPED | OUTPATIENT
Start: 2020-04-02 | End: 2020-08-03 | Stop reason: SDUPTHER

## 2020-04-02 RX ORDER — BUSPIRONE HYDROCHLORIDE 5 MG/1
5 TABLET ORAL 3 TIMES DAILY
Qty: 90 TABLET | Refills: 2 | Status: SHIPPED | OUTPATIENT
Start: 2020-04-02 | End: 2020-04-03

## 2020-04-03 ENCOUNTER — TELEPHONE (OUTPATIENT)
Dept: FAMILY MEDICINE CLINIC | Age: 40
End: 2020-04-03

## 2020-04-03 RX ORDER — BUPROPION HYDROCHLORIDE 150 MG/1
150 TABLET ORAL EVERY MORNING
Qty: 30 TABLET | Refills: 1 | Status: SHIPPED | OUTPATIENT
Start: 2020-04-03 | End: 2020-04-23

## 2020-04-03 NOTE — TELEPHONE ENCOUNTER
Pt called this morning and left VM, when no call back she called  stating that she had a reaction to the Buspar and cannot take it.  Pt then stated that she has taken Zoloft in the past and it also caused side effects please advise

## 2020-04-23 ENCOUNTER — VIRTUAL VISIT (OUTPATIENT)
Dept: FAMILY MEDICINE CLINIC | Age: 40
End: 2020-04-23
Payer: COMMERCIAL

## 2020-04-23 PROCEDURE — G8428 CUR MEDS NOT DOCUMENT: HCPCS | Performed by: FAMILY MEDICINE

## 2020-04-23 PROCEDURE — 99213 OFFICE O/P EST LOW 20 MIN: CPT | Performed by: FAMILY MEDICINE

## 2020-04-23 NOTE — PROGRESS NOTES
Comment: occ, caffeine-4 servings daily average    Drug use: No            PREVIOUS VISIT VITALS AND DATA    Nursing note reviewed  There were no vitals taken for this visit. BP Readings from Last 3 Encounters:   02/13/20 118/80   02/05/20 100/67   02/05/20 105/65     Wt Readings from Last 3 Encounters:   02/13/20 234 lb (106.1 kg)   02/05/20 232 lb (105.2 kg)   01/27/20 238 lb 12.8 oz (108.3 kg)     There is no height or weight on file to calculate BMI. No results found for this visit on 04/23/20. PHYSICAL EXAMINATION:  [ INSTRUCTIONS:  \"[x]\" Indicates a positive item  \"[]\" Indicates a negative item  -- DELETE ALL ITEMS NOT EXAMINED]  Vital Signs: (As obtained by patient/caregiver or practitioner observation)    Blood pressure-  Heart rate-    Respiratory rate-    Temperature-  Pulse oximetry-     Constitutional: [] Appears well-developed and well-nourished [x] No apparent distress      [] Abnormal-   Mental status  [x] Alert and awake  [x] Oriented to person/place/time [x]Able to follow commands      Eyes:  EOM    [x]  Normal  [] Abnormal-  Sclera  [x]  Normal  [] Abnormal -         Discharge [x]  None visible  [] Abnormal -    HENT:   [x] Normocephalic, atraumatic.   [] Abnormal   [x] Mouth/Throat: Mucous membranes are moist.     External Ears [x] Normal  [] Abnormal-     Neck: [x] No visualized mass     Pulmonary/Chest: [x] Respiratory effort normal.  [] No visualized signs of difficulty breathing or respiratory distress        [] Abnormal-      Musculoskeletal:   [x] Normal gait with no signs of ataxia         [x] Normal range of motion of neck        [] Abnormal-       Neurological:        [x] No Facial Asymmetry (Cranial nerve 7 motor function) (limited exam to video visit)          [x] No gaze palsy        [] Abnormal-         Skin:        [x] No significant exanthematous lesions or discoloration noted on facial skin         [] Abnormal-            Psychiatric:       [x] Normal Affect [x] No

## 2020-05-04 ENCOUNTER — TELEPHONE (OUTPATIENT)
Dept: GASTROENTEROLOGY | Age: 40
End: 2020-05-04

## 2020-05-04 ENCOUNTER — VIRTUAL VISIT (OUTPATIENT)
Dept: GASTROENTEROLOGY | Age: 40
End: 2020-05-04
Payer: COMMERCIAL

## 2020-05-04 PROCEDURE — G8417 CALC BMI ABV UP PARAM F/U: HCPCS | Performed by: NURSE PRACTITIONER

## 2020-05-04 PROCEDURE — 1036F TOBACCO NON-USER: CPT | Performed by: NURSE PRACTITIONER

## 2020-05-04 PROCEDURE — 99442 PR PHYS/QHP TELEPHONE EVALUATION 11-20 MIN: CPT | Performed by: NURSE PRACTITIONER

## 2020-05-04 PROCEDURE — G8427 DOCREV CUR MEDS BY ELIG CLIN: HCPCS | Performed by: NURSE PRACTITIONER

## 2020-05-04 RX ORDER — ONDANSETRON 4 MG/1
4 TABLET, ORALLY DISINTEGRATING ORAL EVERY 8 HOURS PRN
Qty: 30 TABLET | Refills: 3 | Status: SHIPPED | OUTPATIENT
Start: 2020-05-04 | End: 2020-06-15 | Stop reason: SDUPTHER

## 2020-05-04 NOTE — PROGRESS NOTES
Ewa Nichols is a 44 y.o. female evaluated via telephone on 5/4/2020. Consent:  She is aware that that she may receive a bill for this telephone service, depending on her insurance coverage, and has provided verbal consent to proceed: Yes she agreed to the telephone visit. Documentation:  I communicated with the patient about follow-up on acid reflux, anemia, epigastric pain and bloating. She reports feeling good. Her last EGD/colonoscopy was done by Dr. Moy Noriega on 2-5-2020. Her EGD revealed normal esophagus, EG junction at 40 cm, small hiatal hernia (1.5 cm) and gastritis noted in stomach antrum. Her stomach biopsies revealed mild chronic gastritis with no evidence of H. Pylori infection. Her GEJ biopsies revealed mild chronic inflammation with no evidence of Schreiber's esophagus. Her colonoscopy revealed grade 1 hemorrhoids and hyperplastic polyp removed from her sigmoid colon. Her random right and left colon biopsies revealed no evidence of microscopic colitis. Her appetite is good and weight is stable. She stopped taking NSAIDs one month ago. No recent episodes of epigastric pain. No current abdominal  bloating or distention. She has intermittent non-worsening nausea.  She takes Zofran once a week. No vomiting. No hematemesis or coffee ground emesis.  Her heartburn and acid reflux is controlled with taking Protonix twice daily.  No nocturnal awakenings with acid reflux.  No dysphagia or pain with swallowing.  No excess belching or flatulence.  Her typical bowel pattern is every two to three days with soft brown formed stools.  No blood in her stools or black tarry stools. No diarrhea or constipation.  Her maternal grandfather had colon cancer in his 52's. Her mother had colon polyps removed in the past.     ROS  Review of Systems   Constitutional: Negative for activity change, appetite change, chills, diaphoresis, fatigue, fever and unexpected weight change.    HENT: Positive for tinnitus. Negative for ear pain and hearing loss. Eyes: Negative for pain and visual disturbance. Respiratory: Positive for shortness of breath. Negative for cough and wheezing. Cardiovascular: Negative for chest pain, palpitations and leg swelling. Gastrointestinal: Negative for abdominal pain, blood in stool, constipation, diarrhea, nausea and vomiting. Endocrine: Negative for cold intolerance and heat intolerance. Genitourinary: Negative for dysuria, frequency and urgency. Musculoskeletal: Positive for myalgias and neck pain. Negative for back pain. Fibromyalgia   Skin: Negative for color change, pallor and rash. Allergic/Immunologic: Negative for environmental allergies and food allergies. Neurological: Positive for headaches. Negative for dizziness and seizures. Hematological: Does not bruise/bleed easily. Psychiatric/Behavioral: Negative for dysphoric mood, sleep disturbance and suicidal ideas. The patient is not nervous/anxious.       Physical Exam  Unable to obtain vitals signs due to telephone visit. Constitutional:       General: She is not in acute distress. HENT:      Head: Normocephalic and atraumatic. Neck:      Musculoskeletal: Normal range of motion. Pulmonary:      Effort: Pulmonary effort is normal. No respiratory distress. Musculoskeletal: Normal range of motion. Skin:     General: Skin is warm and dry. Neurological:      Mental Status: She is alert and oriented to person, place, and time. Psychiatric:         Mood and Affect: Mood normal.      Assessment and Plan:     1.  Epigastric pain that is intermittent most likely from gastritis, acid reflux, esophagitis or possible esophageal spasm.  She has a history of NSAID induced gastric erosions.  The patient was encouraged to continue taking Protonix daily. Her recent EGD revealed mild chronic gastritis with no evidence of H. Pylori infection.   Her GEJ biopsies revealed mild chronic inflammation with

## 2020-05-11 ENCOUNTER — OFFICE VISIT (OUTPATIENT)
Dept: FAMILY MEDICINE CLINIC | Age: 40
End: 2020-05-11
Payer: COMMERCIAL

## 2020-05-11 VITALS
DIASTOLIC BLOOD PRESSURE: 78 MMHG | HEART RATE: 84 BPM | TEMPERATURE: 97 F | BODY MASS INDEX: 40.49 KG/M2 | HEIGHT: 64 IN | OXYGEN SATURATION: 99 % | SYSTOLIC BLOOD PRESSURE: 118 MMHG | WEIGHT: 237.2 LBS

## 2020-05-11 DIAGNOSIS — D64.9 ANEMIA, UNSPECIFIED TYPE: ICD-10-CM

## 2020-05-11 LAB
BASOPHILS ABSOLUTE: 0 K/UL (ref 0–0.2)
BASOPHILS RELATIVE PERCENT: 0.6 %
EOSINOPHILS ABSOLUTE: 0.1 K/UL (ref 0–0.6)
EOSINOPHILS RELATIVE PERCENT: 1.4 %
FERRITIN: 8.5 NG/ML (ref 15–150)
HCT VFR BLD CALC: 34.1 % (ref 36–48)
HEMOGLOBIN: 11.1 G/DL (ref 12–16)
IRON SATURATION: 6 % (ref 15–50)
IRON: 26 UG/DL (ref 37–145)
LYMPHOCYTES ABSOLUTE: 2 K/UL (ref 1–5.1)
LYMPHOCYTES RELATIVE PERCENT: 28.2 %
MCH RBC QN AUTO: 27.7 PG (ref 26–34)
MCHC RBC AUTO-ENTMCNC: 32.7 G/DL (ref 31–36)
MCV RBC AUTO: 84.8 FL (ref 80–100)
MONOCYTES ABSOLUTE: 0.6 K/UL (ref 0–1.3)
MONOCYTES RELATIVE PERCENT: 8.4 %
NEUTROPHILS ABSOLUTE: 4.4 K/UL (ref 1.7–7.7)
NEUTROPHILS RELATIVE PERCENT: 61.4 %
PDW BLD-RTO: 15.4 % (ref 12.4–15.4)
PLATELET # BLD: 326 K/UL (ref 135–450)
PMV BLD AUTO: 9.2 FL (ref 5–10.5)
RBC # BLD: 4.02 M/UL (ref 4–5.2)
TOTAL IRON BINDING CAPACITY: 429 UG/DL (ref 260–445)
WBC # BLD: 7.1 K/UL (ref 4–11)

## 2020-05-11 PROCEDURE — G8427 DOCREV CUR MEDS BY ELIG CLIN: HCPCS | Performed by: FAMILY MEDICINE

## 2020-05-11 PROCEDURE — G8417 CALC BMI ABV UP PARAM F/U: HCPCS | Performed by: FAMILY MEDICINE

## 2020-05-11 PROCEDURE — 1036F TOBACCO NON-USER: CPT | Performed by: FAMILY MEDICINE

## 2020-05-11 PROCEDURE — 99213 OFFICE O/P EST LOW 20 MIN: CPT | Performed by: FAMILY MEDICINE

## 2020-05-11 ASSESSMENT — ENCOUNTER SYMPTOMS
COUGH: 0
WHEEZING: 0
COLOR CHANGE: 1
CHEST TIGHTNESS: 0
SHORTNESS OF BREATH: 0
BACK PAIN: 0
ABDOMINAL PAIN: 0

## 2020-05-11 NOTE — PROGRESS NOTES
Chief Complaint   Patient presents with    Mass     right breast- no pain        [place] NARRATIVE:    With slightly enlarged mole-like lesion to right breast, slightly bigger than 1 cm grace, present for a long time but derm who saw her previously suggested it should come off. NOW IN PAST FEW DAYS she has noticed a small nodule in skin just beneath the mole, smaller than the mole. Patient is established unless otherwise noted. Above chief complaint and [place] obtained by this physician provider. Review of Systems   Constitutional: Negative for activity change, chills, fatigue and fever. HENT: Negative for congestion. Respiratory: Negative for cough, chest tightness, shortness of breath and wheezing. Cardiovascular: Negative for chest pain and leg swelling. Gastrointestinal: Negative for abdominal pain. Musculoskeletal: Negative for back pain and myalgias. Skin: Positive for color change (1 cm neoplasm, likely seb k on right breast with underlying tiny nodule). Negative for rash. Psychiatric/Behavioral: Negative for behavioral problems and dysphoric mood. Allergies   Allergen Reactions    Buspar [Buspirone]     Zoloft [Sertraline Hcl]      Allergy historyupdated. Outpatient Medications Marked as Taking for the 20 encounter (Office Visit) with Hosea Yates MD   Medication Sig Dispense Refill    ondansetron (ZOFRAN ODT) 4 MG disintegrating tablet Take 1 tablet by mouth every 8 hours as needed for Nausea or Vomiting 30 tablet 3    pantoprazole (PROTONIX) 40 MG tablet Take 1 tablet by mouth 2 times daily 60 tablet 5    tiZANidine (ZANAFLEX) 4 MG tablet Take 1 tablet by mouth nightly 30 tablet 5    Multiple Vitamins-Minerals (MULTI COMPLETE PO) Take by mouth         Tobacco use history updated.    Social History     Tobacco Use   Smoking Status Former Smoker    Types: Cigarettes    Last attempt to quit: 1998    Years since quittin.8   Smokeless Tobacco Never Used Nursing note reviewed. Vitals:    05/11/20 1434   BP: 118/78   Site: Left Upper Arm   Position: Sitting   Cuff Size: Large Adult   Pulse: 84   Temp: 97 °F (36.1 °C)   SpO2: 99%   Weight: 237 lb 3.2 oz (107.6 kg)   Height: 5' 4\" (1.626 m)          BP Readings from Last 3 Encounters:   05/11/20 118/78   02/13/20 118/80   02/05/20 100/67     Wt Readings from Last 3 Encounters:   05/11/20 237 lb 3.2 oz (107.6 kg)   02/13/20 234 lb (106.1 kg)   02/05/20 232 lb (105.2 kg)     Body mass index is 40.72 kg/m². No results found for this visit on 05/11/20. Physical Exam        _________________________________________________  Assessment:     Deep Friedman was seen today for mass. Diagnoses and all orders for this visit:    Neoplasm of uncertain behavior  -     Darryl Tate MD, [facility], [place]    Subcutaneous nodule of breast  -     Darryl Tate MD, [facility], [place]      Problems listed above are stable except as follows: suspect both lesions are benign but not for sure. We will ask Dr. Phoebe Curling to see it and consider whether the mole and/or underlying nodule should be excised. Therapeutic plan is unchanged unless otherwise specified. See orders above and comments below for details of workup or medication orders. _________________________________________________  Plan:     Referral generated and sent internally, patient will expect a call. Return if symptoms worsen or fail to improve.       Electronically signed by Kenton Madrid MD on 5/11/20 at 12:35 PM EDT

## 2020-05-18 NOTE — PROGRESS NOTES
erythema. There is no tenderness. There is no ulceration      Assessment & Plan:     Skin lesion of right outer breast     1. I have discussed treatment options consisting of observation or excision under local anesthesia. 2. Patientis inclined to proceed with excision. 3. Verbal patient instruction given.   4. Follow up in 2 weeks     Humera Ashford, APRN-CNP

## 2020-05-19 ENCOUNTER — OFFICE VISIT (OUTPATIENT)
Dept: SURGERY | Age: 40
End: 2020-05-19
Payer: COMMERCIAL

## 2020-05-19 VITALS
HEIGHT: 64 IN | BODY MASS INDEX: 40.67 KG/M2 | HEART RATE: 82 BPM | RESPIRATION RATE: 14 BRPM | DIASTOLIC BLOOD PRESSURE: 84 MMHG | SYSTOLIC BLOOD PRESSURE: 108 MMHG | WEIGHT: 238.2 LBS

## 2020-05-19 PROCEDURE — G8417 CALC BMI ABV UP PARAM F/U: HCPCS | Performed by: NURSE PRACTITIONER

## 2020-05-19 PROCEDURE — 1036F TOBACCO NON-USER: CPT | Performed by: NURSE PRACTITIONER

## 2020-05-19 PROCEDURE — 99203 OFFICE O/P NEW LOW 30 MIN: CPT | Performed by: NURSE PRACTITIONER

## 2020-05-19 PROCEDURE — G8427 DOCREV CUR MEDS BY ELIG CLIN: HCPCS | Performed by: NURSE PRACTITIONER

## 2020-05-19 ASSESSMENT — ENCOUNTER SYMPTOMS
ABDOMINAL PAIN: 0
VOMITING: 0
NAUSEA: 0
COLOR CHANGE: 0
WHEEZING: 0
SHORTNESS OF BREATH: 0

## 2020-05-29 ENCOUNTER — PROCEDURE VISIT (OUTPATIENT)
Dept: SURGERY | Age: 40
End: 2020-05-29
Payer: COMMERCIAL

## 2020-05-29 ENCOUNTER — HOSPITAL ENCOUNTER (OUTPATIENT)
Age: 40
Setting detail: SPECIMEN
Discharge: HOME | End: 2020-05-29
Payer: COMMERCIAL

## 2020-05-29 ENCOUNTER — TELEPHONE (OUTPATIENT)
Dept: SURGERY | Age: 40
End: 2020-05-29

## 2020-05-29 VITALS
DIASTOLIC BLOOD PRESSURE: 82 MMHG | SYSTOLIC BLOOD PRESSURE: 134 MMHG | RESPIRATION RATE: 16 BRPM | TEMPERATURE: 98.6 F | HEART RATE: 87 BPM

## 2020-05-29 PROCEDURE — 19120 REMOVAL OF BREAST LESION: CPT | Performed by: NURSE PRACTITIONER

## 2020-05-29 PROCEDURE — 88305 TISSUE EXAM BY PATHOLOGIST: CPT

## 2020-06-03 ENCOUNTER — HOSPITAL ENCOUNTER (OUTPATIENT)
Dept: INFUSION THERAPY | Age: 40
Discharge: HOME | End: 2020-06-03
Payer: COMMERCIAL

## 2020-06-03 PROCEDURE — 99201 HC NEW PT, OUTPT VISIT LEVEL 1: CPT

## 2020-06-09 ENCOUNTER — OFFICE VISIT (OUTPATIENT)
Dept: SURGERY | Age: 40
End: 2020-06-09

## 2020-06-09 VITALS
SYSTOLIC BLOOD PRESSURE: 142 MMHG | HEART RATE: 88 BPM | DIASTOLIC BLOOD PRESSURE: 78 MMHG | OXYGEN SATURATION: 98 % | TEMPERATURE: 98.7 F

## 2020-06-09 PROCEDURE — 99024 POSTOP FOLLOW-UP VISIT: CPT | Performed by: NURSE PRACTITIONER

## 2020-06-15 ENCOUNTER — VIRTUAL VISIT (OUTPATIENT)
Dept: FAMILY MEDICINE CLINIC | Age: 40
End: 2020-06-15
Payer: COMMERCIAL

## 2020-06-15 PROCEDURE — G8427 DOCREV CUR MEDS BY ELIG CLIN: HCPCS | Performed by: NURSE PRACTITIONER

## 2020-06-15 PROCEDURE — 99212 OFFICE O/P EST SF 10 MIN: CPT | Performed by: NURSE PRACTITIONER

## 2020-06-15 RX ORDER — SUCRALFATE 1 G/1
1 TABLET ORAL 4 TIMES DAILY
Qty: 120 TABLET | Refills: 3 | Status: SHIPPED | OUTPATIENT
Start: 2020-06-15 | End: 2020-11-19 | Stop reason: ALTCHOICE

## 2020-06-15 RX ORDER — ONDANSETRON 4 MG/1
4 TABLET, ORALLY DISINTEGRATING ORAL EVERY 8 HOURS PRN
Qty: 30 TABLET | Refills: 3 | Status: SHIPPED | OUTPATIENT
Start: 2020-06-15 | End: 2020-11-19 | Stop reason: SDUPTHER

## 2020-06-15 ASSESSMENT — ENCOUNTER SYMPTOMS
BACK PAIN: 0
NAUSEA: 1
BLOOD IN STOOL: 0
CONSTIPATION: 0
TROUBLE SWALLOWING: 0
DIARRHEA: 0
SHORTNESS OF BREATH: 0
VOMITING: 1
ABDOMINAL PAIN: 1
COUGH: 0

## 2020-06-15 NOTE — PROGRESS NOTES
gastritis  Start Carafate before meals  Increase water intake  Make sure to eat frequent meals throughout the day, avoid an empty stomach. Can take Tums on top of Protonix dose. Avoid NSAIDs at all cost    2. Nausea  - ondansetron (ZOFRAN ODT) 4 MG disintegrating tablet; Take 1 tablet by mouth every 8 hours as needed for Nausea or Vomiting  Dispense: 30 tablet; Refill: 3    3. Gastroesophageal reflux disease without esophagitis    4. Epigastric pain      We discussed her symptoms and management. I did instruct her to go to the ER if she began vomiting and was unable, bloody emesis, severe unmanageable pain. Sara Bautista is a 36 y.o. female being evaluated by a Virtual Visit (video visit) encounter to address concerns as mentioned above. A caregiver was present when appropriate. Due to this being a TeleHealth encounter ([facility]), evaluation of the following organ systems was limited: Vitals/Constitutional/EENT/Resp/CV/GI//MS/Neuro/Skin/Heme-Lymph-Imm. Pursuant to the emergency declaration under the [address] [place] [address], [address] [place] [address] authority and the [employer] and [employer] Act, this Virtual Visit was conducted with patient's (and/or legal guardian's) consent, to reduce the patient's risk of exposure to COVID-19 and provide necessary medical care. The patient (and/or legal guardian) has also been advised to contact this office for worsening conditions or problems, and seek emergency medical treatment and/or call 911 if deemed necessary. Patient identification was verified at the start of the visit: Yes    Total time spent on this encounter: 15 minutes    Services were provided through a video synchronous discussion virtually to substitute for in-person clinic visit. Patient and provider were located at their individual homes.     --MARIA LUZ Villatoro NP on 6/15/2020 at 4:25 PM    An electronic signature was used to authenticate this note.

## 2020-07-22 ENCOUNTER — HOSPITAL ENCOUNTER (OUTPATIENT)
Dept: INFUSION THERAPY | Age: 40
Discharge: HOME | End: 2020-07-22
Payer: COMMERCIAL

## 2020-07-22 PROBLEM — D50.0 IRON DEFICIENCY ANEMIA SECONDARY TO BLOOD LOSS (CHRONIC): Status: ACTIVE | Noted: 2020-07-22

## 2020-07-22 LAB
ALBUMIN SERPL-MCNC: 4.5 GM/DL (ref 3.4–5)
ALP BLD-CCNC: 71 IU/L (ref 40–129)
ALT SERPL-CCNC: 22 U/L (ref 10–40)
ANION GAP SERPL CALCULATED.3IONS-SCNC: 13 MMOL/L (ref 4–16)
AST SERPL-CCNC: 20 IU/L (ref 15–37)
BASOPHILS ABSOLUTE: 0 K/CU MM
BASOPHILS RELATIVE PERCENT: 0.3 % (ref 0–1)
BILIRUB SERPL-MCNC: 0.3 MG/DL (ref 0–1)
BUN BLDV-MCNC: 9 MG/DL (ref 6–23)
CALCIUM SERPL-MCNC: 9.3 MG/DL (ref 8.3–10.6)
CHLORIDE BLD-SCNC: 104 MMOL/L (ref 99–110)
CO2: 22 MMOL/L (ref 21–32)
CREAT SERPL-MCNC: 0.7 MG/DL (ref 0.6–1.1)
DIFFERENTIAL TYPE: ABNORMAL
EOSINOPHILS ABSOLUTE: 0.1 K/CU MM
EOSINOPHILS RELATIVE PERCENT: 1 % (ref 0–3)
FERRITIN: 19 NG/ML (ref 15–150)
FOLATE: >20 NG/ML (ref 3.1–17.5)
GFR AFRICAN AMERICAN: >60 ML/MIN/1.73M2
GFR NON-AFRICAN AMERICAN: >60 ML/MIN/1.73M2
GLUCOSE BLD-MCNC: 130 MG/DL (ref 70–99)
HCT VFR BLD CALC: 36.2 % (ref 37–47)
HEMOGLOBIN: 12 GM/DL (ref 12.5–16)
IRON: 70 UG/DL (ref 37–145)
LACTATE DEHYDROGENASE: 166 IU/L (ref 120–246)
LYMPHOCYTES ABSOLUTE: 1.7 K/CU MM
LYMPHOCYTES RELATIVE PERCENT: 19.7 % (ref 24–44)
MCH RBC QN AUTO: 29.3 PG (ref 27–31)
MCHC RBC AUTO-ENTMCNC: 33.1 % (ref 32–36)
MCV RBC AUTO: 88.5 FL (ref 78–100)
MONOCYTES ABSOLUTE: 0.7 K/CU MM
MONOCYTES RELATIVE PERCENT: 8.4 % (ref 0–4)
PCT TRANSFERRIN: 19 % (ref 10–44)
PDW BLD-RTO: 14.6 % (ref 11.7–14.9)
PLATELET # BLD: 307 K/CU MM (ref 140–440)
PMV BLD AUTO: 10.3 FL (ref 7.5–11.1)
POTASSIUM SERPL-SCNC: 4.5 MMOL/L (ref 3.5–5.1)
RBC # BLD: 4.09 M/CU MM (ref 4.2–5.4)
SEGMENTED NEUTROPHILS ABSOLUTE COUNT: 6.1 K/CU MM
SEGMENTED NEUTROPHILS RELATIVE PERCENT: 70.6 % (ref 36–66)
SODIUM BLD-SCNC: 139 MMOL/L (ref 135–145)
TOTAL IRON BINDING CAPACITY: 364 UG/DL (ref 250–450)
TOTAL PROTEIN: 7.1 GM/DL (ref 6.4–8.2)
UNSATURATED IRON BINDING CAPACITY: 294 UG/DL (ref 110–370)
VITAMIN B-12: 917.1 PG/ML (ref 211–911)
WBC # BLD: 8.7 K/CU MM (ref 4–10.5)

## 2020-07-22 PROCEDURE — 85247 CLOT FACTOR VIII MULTIMETRIC: CPT

## 2020-07-22 PROCEDURE — 83615 LACTATE (LD) (LDH) ENZYME: CPT

## 2020-07-22 PROCEDURE — 83540 ASSAY OF IRON: CPT

## 2020-07-22 PROCEDURE — 82746 ASSAY OF FOLIC ACID SERUM: CPT

## 2020-07-22 PROCEDURE — 85025 COMPLETE CBC W/AUTO DIFF WBC: CPT

## 2020-07-22 PROCEDURE — 36415 COLL VENOUS BLD VENIPUNCTURE: CPT

## 2020-07-22 PROCEDURE — 85246 CLOT FACTOR VIII VW ANTIGEN: CPT

## 2020-07-22 PROCEDURE — 82607 VITAMIN B-12: CPT

## 2020-07-22 PROCEDURE — 80053 COMPREHEN METABOLIC PANEL: CPT

## 2020-07-22 PROCEDURE — 82728 ASSAY OF FERRITIN: CPT

## 2020-07-22 PROCEDURE — 83550 IRON BINDING TEST: CPT

## 2020-07-22 PROCEDURE — 85245 CLOT FACTOR VIII VW RISTOCTN: CPT

## 2020-07-24 LAB — VON WILLEBRAND AG: 89 % (ref 52–214)

## 2020-07-25 LAB — RISTOCETIN CO-FACTOR: 47 % (ref 51–215)

## 2020-07-29 LAB — VON WILLEBRAND MULTIMERS: NORMAL

## 2020-08-03 ENCOUNTER — VIRTUAL VISIT (OUTPATIENT)
Dept: GASTROENTEROLOGY | Age: 40
End: 2020-08-03

## 2020-08-03 ENCOUNTER — TELEPHONE (OUTPATIENT)
Dept: GASTROENTEROLOGY | Age: 40
End: 2020-08-03

## 2020-08-03 PROCEDURE — G8427 DOCREV CUR MEDS BY ELIG CLIN: HCPCS | Performed by: NURSE PRACTITIONER

## 2020-08-03 PROCEDURE — 99213 OFFICE O/P EST LOW 20 MIN: CPT | Performed by: NURSE PRACTITIONER

## 2020-08-03 PROCEDURE — 1036F TOBACCO NON-USER: CPT | Performed by: NURSE PRACTITIONER

## 2020-08-03 PROCEDURE — G8417 CALC BMI ABV UP PARAM F/U: HCPCS | Performed by: NURSE PRACTITIONER

## 2020-08-03 RX ORDER — FAMOTIDINE 40 MG/1
40 TABLET, FILM COATED ORAL EVERY EVENING
Qty: 30 TABLET | Refills: 5 | Status: SHIPPED | OUTPATIENT
Start: 2020-08-03 | End: 2020-11-19 | Stop reason: SDUPTHER

## 2020-08-03 RX ORDER — PANTOPRAZOLE SODIUM 40 MG/1
40 TABLET, DELAYED RELEASE ORAL 2 TIMES DAILY
Qty: 60 TABLET | Refills: 5 | Status: SHIPPED | OUTPATIENT
Start: 2020-08-03 | End: 2020-11-19 | Stop reason: SDUPTHER

## 2020-08-03 NOTE — PROGRESS NOTES
Gaurav Hernandez is a 36 y.o. female evaluated via telephone on 8/3/2020. Consent:  She is aware that that she may receive a bill for this telephone service, depending on her insurance coverage, and has provided verbal consent to proceed: Yes she agreed to the visit. Documentation:  I communicated with the patient about follow-up on acid reflux, anemia, epigastric pain and bloating. She reports feeling fair. She is having intermittent episodes of epigastric pain described as a dull achy burn under her breast bone that started in June. She stopped taking Protonix twice daily a month ago. She is currently taking Protonix at bedtime. She was started on Carafate with no improvement. Her last episode of epigastric pain was the day before yesterday around mid morning after eating breakfast.  She described her pain as a moderate ache and burn under her breast with nausea. Her appetite is good and weight is stable. She is not taking NSAIDs. No current abdominal  pain, bloating or distention.  She has intermittent non-worsening nausea. She takes Zofran once a week.  No vomiting.  No hematemesis or coffee ground emesis. Her heartburn and acid reflux was controlled with taking Protonix twice daily.  She is currently taking once daily with poor relief. Occasional nocturnal awakenings with acid reflux a couple times a week.  No dysphagia or pain with swallowing.  No excess belching or flatulence.  Her typical bowel pattern is every two to three days with soft brown formed stools.  No blood in her stools or black tarry stools.  No diarrhea or constipation.  Her maternal grandfather had colon cancer in his 52's. [facility] mother had colon polyps removed in the past.     ROS  Review of Systems   Constitutional: Negative for activity change, appetite change, chills, diaphoresis, fatigue, fever and unexpected weight change. HENT: Positive for tinnitus.  Negative for ear pain and hearing loss.    Eyes: Negative for pain and visual disturbance. Respiratory: Positive for shortness of breath. Negative for cough and wheezing.    Cardiovascular: Negative for chest pain, palpitations and leg swelling. Gastrointestinal: Negative for abdominal pain, blood in stool, constipation, diarrhea, nausea and vomiting. Endocrine: Negative for cold intolerance and heat intolerance. Genitourinary: Negative for dysuria, frequency and urgency. Musculoskeletal: Positive for myalgias and neck pain. Negative for back pain.        Fibromyalgia   Skin: Negative for color change, pallor and rash. Allergic/Immunologic: Negative for environmental allergies and food allergies. Neurological: Positive for headaches. Negative for dizziness and seizures. Hematological: Does not bruise/bleed easily. Psychiatric/Behavioral: Negative for dysphoric mood, sleep disturbance and suicidal ideas. The patient is not nervous/anxious.         Physical Exam  Unable to obtain vitals signs due to telephone visit. Constitutional:       General: She is not in acute distress. HENT:      Head: Normocephalic and atraumatic. Neck:      Musculoskeletal: Normal range of motion.     Pulmonary:      Effort: Pulmonary effort is normal. No respiratory distress. Musculoskeletal: Normal range of motion. Skin:     General: Skin is warm and dry. Neurological:      Mental Status: She is alert and oriented to person, place, and time. Psychiatric:         Mood and Affect: Mood normal.     Assessment and Plan:    1.  Epigastric pain that is intermittent most likely from acid reflux induced esophagitis or possible esophageal spasm.  She denies NSAID use. The patient was encouraged to restart taking Protonix twice daily.  Will add Pepcid 40 mg to take at bedtime for breakthrough acid reflux. Her last EGD revealed mild chronic gastritis with no evidence of H.  Pylori infection.  Her GEJ biopsies revealed mild chronic inflammation with no evidence of Schreiber's esophagus.  Her EGD revealed chronic gastritis with no evidence of peptic ulcer disease.  The patient has been advised to avoid NSAIDs, avoid eating late at night and avoid foods that worsen.      2.  GERD that is stable without odynophagia or dysphagia.  The patient was encouraged to continue taking Protonix twice daily for the treatment of acid reflux. The patient was provided with information on acid reflux diet and triggers.  Recommend weight loss, avoid eating late at night and avoid foods that trigger acid reflux. 3.  Chronic intermittent nausea most likely from gastritis, diet or medication related.  The patient was encouraged to take Zofran as needed. 4.  Anemia of unknown etiology.  The patient was encouraged to follow-up with Hematology as scheduled. She has a family history of Leyla Wagnern Willebrand's type I with four out of her six children testing positive. Her CBC done on 7- revealed RBC 4.09, Hgb 12.0, Hct 36.2 and Platelets 927.  Her celiac panel was normal with no evidence of Celiac disease.  No evidence of GI bleeding.  Avoid NSAIDs.  Recommend periodic monitoring of H&H.  Iron levels are normal at 70.  5.  The patient was encouraged to follow-up in 3 months.               I affirm this is a Patient Initiated Episode with a Patient who has not had a related appointment within my department in the past 7 days or scheduled within the next 24 hours. Patient identification was verified at the start of the visit: Yes she verified her full name and date of birth. Total Time:  15 minutes.     Riley Lee

## 2020-08-07 ENCOUNTER — TELEPHONE (OUTPATIENT)
Dept: GASTROENTEROLOGY | Age: 40
End: 2020-08-07

## 2020-08-07 NOTE — TELEPHONE ENCOUNTER
Not constipated and not heartburn, it feels like a constant sharp and dull pain and constant squeezing in mid abdomen, taking Protonix bid. Tums and Gas X are not helping either.

## 2020-10-12 RX ORDER — TIZANIDINE 4 MG/1
TABLET ORAL
Qty: 30 TABLET | Refills: 5 | Status: SHIPPED | OUTPATIENT
Start: 2020-10-12 | End: 2021-04-27 | Stop reason: SDUPTHER

## 2020-11-19 ENCOUNTER — VIRTUAL VISIT (OUTPATIENT)
Dept: GASTROENTEROLOGY | Age: 40
End: 2020-11-19
Payer: COMMERCIAL

## 2020-11-19 PROCEDURE — G8484 FLU IMMUNIZE NO ADMIN: HCPCS | Performed by: NURSE PRACTITIONER

## 2020-11-19 PROCEDURE — G8417 CALC BMI ABV UP PARAM F/U: HCPCS | Performed by: NURSE PRACTITIONER

## 2020-11-19 PROCEDURE — G8427 DOCREV CUR MEDS BY ELIG CLIN: HCPCS | Performed by: NURSE PRACTITIONER

## 2020-11-19 PROCEDURE — 1036F TOBACCO NON-USER: CPT | Performed by: NURSE PRACTITIONER

## 2020-11-19 PROCEDURE — 99213 OFFICE O/P EST LOW 20 MIN: CPT | Performed by: NURSE PRACTITIONER

## 2020-11-19 RX ORDER — PANTOPRAZOLE SODIUM 40 MG/1
40 TABLET, DELAYED RELEASE ORAL 2 TIMES DAILY
Qty: 60 TABLET | Refills: 5 | Status: SHIPPED | OUTPATIENT
Start: 2020-11-19 | End: 2021-02-02 | Stop reason: SDUPTHER

## 2020-11-19 RX ORDER — ONDANSETRON 4 MG/1
4 TABLET, ORALLY DISINTEGRATING ORAL EVERY 8 HOURS PRN
Qty: 30 TABLET | Refills: 3 | Status: SHIPPED | OUTPATIENT
Start: 2020-11-19 | End: 2021-02-02 | Stop reason: SDUPTHER

## 2020-11-19 RX ORDER — FAMOTIDINE 40 MG/1
40 TABLET, FILM COATED ORAL EVERY EVENING
Qty: 30 TABLET | Refills: 5 | Status: SHIPPED | OUTPATIENT
Start: 2020-11-19 | End: 2021-02-02 | Stop reason: SDUPTHER

## 2020-11-19 NOTE — PROGRESS NOTES
Austin Osborn is a 36 y.o. female evaluated via telephone on 11/19/2020. Consent:  She is aware that that she may receive a bill for this telephone service, depending on her insurance coverage, and has provided verbal consent to proceed: Yes she agreed to the telephone visit. Documentation:  I communicated with the patient about follow-up on acid reflux, bloating and epigastric pain. She is not taking NSAIDs. She reports feeling good. Her abdominal pain has resolved since increase in Protonix and taking Pepcid. Her appetite is good and weight is stable. She has cut back on her coffee intake to one to two cups a week. Her heartburn and acid reflux is controlled with taking Protonix twice daily.  No nocturnal awakenings with acid reflux. No dysphagia or pain with swallowing. No abdominal pain, bloating or distention. Mild nausea every morning two to three times a week. No vomiting. No excess belching or flatulence. Her typical bowel pattern is every two to three days with soft brown formed stools.  No diarrhea or constipation. No blood in her stools or black tarry stools.  Her maternal grandfather had colon cancer in his 52's.  Her mother had colon polyps removed in the past.      ROS  Review of Systems   Constitutional: Negative for activity change, appetite change, chills, diaphoresis, fatigue, fever and unexpected weight change. HENT: Positive for tinnitus. Negative for ear pain and hearing loss.    Eyes: Negative for pain and visual disturbance. Respiratory: Positive for shortness of breath. Negative for cough and wheezing.    Cardiovascular: Negative for chest pain, palpitations and leg swelling. Gastrointestinal: Negative for abdominal pain, blood in stool, constipation, diarrhea, nausea and vomiting. Endocrine: Negative for cold intolerance and heat intolerance. Genitourinary: Negative for dysuria, frequency and urgency. Musculoskeletal: Positive for myalgias and neck pain. Negative for back pain.        Fibromyalgia   Skin: Negative for color change, pallor and rash. Allergic/Immunologic: Negative for environmental allergies and food allergies. Neurological: Positive for headaches. Negative for dizziness and seizures. Hematological: Does not bruise/bleed easily. Psychiatric/Behavioral: Negative for dysphoric mood, sleep disturbance and suicidal ideas. The patient is not nervous/anxious.      Physical Exam  Unable to obtain vitals signs due to telephone visit. Constitutional:       General: She is not in acute distress. HENT:      Head: Normocephalic and atraumatic.   Neck:      Musculoskeletal: Normal range of motion.     Pulmonary:      Effort: Pulmonary effort is normal. No respiratory distress. Musculoskeletal: Normal range of motion. Skin:     General: Skin is warm and dry. Neurological:      Mental Status: She is alert and oriented to person, place, and time. Psychiatric:         Mood and Affect: Mood normal.      Assessment and Plan:     1.  GERD that is stable without odynophagia or dysphagia.  Her EGD showed gastroesophageal biopsies revealed mild chronic inflammation with no evidence of Schreiber's esophagus. Her EGD revealed chronic gastritis with no evidence of peptic ulcer disease. The patient was encouraged to continue taking Protonix twice daily for the  treatment of acid reflux. The patient was provided with information on acid reflux diet and triggers.  Recommend weight loss, avoid eating late at night and avoid foods that trigger acid reflux. 2.  Chronic intermittent nausea most likely from gastritis, diet or medication related.  The patient was encouraged to take Zofran as needed. 3.  The patient was encouraged to follow-up in 3 months.             I affirm this is a Patient Initiated Episode with a Patient who has not had a related appointment within my department in the past 7 days or scheduled within the next 24 hours.     Patient identification was verified at the start of the visit: Yes she verified her full name and date of birth.     Total Time: 15 minutes    Note: not billable if this call serves to triage the patient into an appointment for the relevant concern      Cassie Hatch

## 2020-11-20 ENCOUNTER — TELEPHONE (OUTPATIENT)
Dept: GASTROENTEROLOGY | Age: 40
End: 2020-11-20

## 2020-12-31 ENCOUNTER — TELEPHONE (OUTPATIENT)
Dept: FAMILY MEDICINE CLINIC | Age: 40
End: 2020-12-31

## 2021-01-02 NOTE — TELEPHONE ENCOUNTER
So if cough has come back then I would suggest we consider round of antibiotics? Especially if any sinus sx or colored drainage. I can send this if she wants.

## 2021-01-04 RX ORDER — CEFUROXIME AXETIL 250 MG/1
250 TABLET ORAL 2 TIMES DAILY
Qty: 20 TABLET | Refills: 0 | Status: SHIPPED | OUTPATIENT
Start: 2021-01-04 | End: 2021-01-14

## 2021-02-02 ENCOUNTER — VIRTUAL VISIT (OUTPATIENT)
Dept: GASTROENTEROLOGY | Age: 41
End: 2021-02-02

## 2021-02-02 DIAGNOSIS — R11.0 NAUSEA: ICD-10-CM

## 2021-02-02 DIAGNOSIS — K21.9 GASTROESOPHAGEAL REFLUX DISEASE WITHOUT ESOPHAGITIS: Primary | ICD-10-CM

## 2021-02-02 PROCEDURE — G8484 FLU IMMUNIZE NO ADMIN: HCPCS | Performed by: NURSE PRACTITIONER

## 2021-02-02 PROCEDURE — 99212 OFFICE O/P EST SF 10 MIN: CPT | Performed by: NURSE PRACTITIONER

## 2021-02-02 PROCEDURE — G8427 DOCREV CUR MEDS BY ELIG CLIN: HCPCS | Performed by: NURSE PRACTITIONER

## 2021-02-02 PROCEDURE — G8417 CALC BMI ABV UP PARAM F/U: HCPCS | Performed by: NURSE PRACTITIONER

## 2021-02-02 PROCEDURE — 1036F TOBACCO NON-USER: CPT | Performed by: NURSE PRACTITIONER

## 2021-02-02 RX ORDER — ONDANSETRON 4 MG/1
4 TABLET, ORALLY DISINTEGRATING ORAL EVERY 8 HOURS PRN
Qty: 30 TABLET | Refills: 3 | Status: SHIPPED | OUTPATIENT
Start: 2021-02-02 | End: 2021-06-25 | Stop reason: SDUPTHER

## 2021-02-02 RX ORDER — PANTOPRAZOLE SODIUM 40 MG/1
40 TABLET, DELAYED RELEASE ORAL 2 TIMES DAILY
Qty: 60 TABLET | Refills: 5 | Status: SHIPPED | OUTPATIENT
Start: 2021-02-02 | End: 2021-06-25 | Stop reason: SDUPTHER

## 2021-02-02 RX ORDER — FAMOTIDINE 40 MG/1
40 TABLET, FILM COATED ORAL EVERY EVENING
Qty: 30 TABLET | Refills: 5 | Status: SHIPPED | OUTPATIENT
Start: 2021-02-02 | End: 2021-06-25 | Stop reason: SDUPTHER

## 2021-02-02 NOTE — PROGRESS NOTES
Mika Mello is a 36 y.o. female evaluated via telephone on 2/2/2021. Consent:  She is aware that that she may receive a bill for this telephone service, depending on her insurance coverage, and has provided verbal consent to proceed: Yes she agreed to the telephone visit. Documentation:  I communicated with the patient about follow-up on acid reflux, bloating and epigastric pain. She mentioned having the covid virus on December 25 with symptoms of nasal congestion, coughand loss of  Taste and smell. Her taste and smell returned roughly eight days after obtaining the virus. She reports feeling good. Her appetite is good and weight is stable. She does have intermittent nausea since the virus. She is taking Zofran once a week with good results. No vomiting. Her heartburn and acid reflux is controlled with Protonix and Pepcid. No nocturnal awakenings with acid reflux. No dysphagia or pain with swallowing. No current abdominal pain, bloating or distention. Her last episode of pain was over one week ago. No excess belching or flatulence. Her typical bowel pattern is daily with soft brown formed stools.  No diarrhea or constipation. No blood in her stools or black tarry stools.  Her maternal grandfather had colon cancer in his 52's.  Her mother had colon polyps removed in the past.     ROS  Review of Systems   Constitutional: Negative for activity change, appetite change, chills, diaphoresis, fatigue, fever and unexpected weight change. HENT: Positive for tinnitus. Negative for ear pain and hearing loss.    Eyes: Negative for pain and visual disturbance. Respiratory: Positive for shortness of breath. Negative for cough and wheezing.    Cardiovascular: Negative for chest pain, palpitations and leg swelling. Gastrointestinal: Negative for abdominal pain, blood in stool, constipation, diarrhea, nausea and vomiting. Endocrine: Negative for cold intolerance and heat intolerance.

## 2021-02-03 ENCOUNTER — TELEPHONE (OUTPATIENT)
Dept: GASTROENTEROLOGY | Age: 41
End: 2021-02-03

## 2021-03-10 ENCOUNTER — TELEPHONE (OUTPATIENT)
Dept: FAMILY MEDICINE CLINIC | Age: 41
End: 2021-03-10

## 2021-03-10 DIAGNOSIS — Z20.822 EXPOSURE TO COVID-19 VIRUS: Primary | ICD-10-CM

## 2021-03-10 NOTE — TELEPHONE ENCOUNTER
Patient called and stating that she was exposed to someone last Wednesday who is positive for Covid, patient has to take child to [facility] for [facility] test and would like to know if she could receive a order to take along with her so she can be done as well.

## 2021-04-27 ENCOUNTER — VIRTUAL VISIT (OUTPATIENT)
Dept: FAMILY MEDICINE CLINIC | Age: 41
End: 2021-04-27
Payer: COMMERCIAL

## 2021-04-27 DIAGNOSIS — M79.7 FIBROMYALGIA: ICD-10-CM

## 2021-04-27 DIAGNOSIS — R41.840 CONCENTRATION DEFICIT: Primary | ICD-10-CM

## 2021-04-27 DIAGNOSIS — F41.1 GAD (GENERALIZED ANXIETY DISORDER): ICD-10-CM

## 2021-04-27 DIAGNOSIS — K21.9 GASTROESOPHAGEAL REFLUX DISEASE WITHOUT ESOPHAGITIS: ICD-10-CM

## 2021-04-27 DIAGNOSIS — F41.0 PANIC ANXIETY SYNDROME: ICD-10-CM

## 2021-04-27 DIAGNOSIS — Z86.16 HISTORY OF 2019 NOVEL CORONAVIRUS DISEASE (COVID-19): ICD-10-CM

## 2021-04-27 DIAGNOSIS — J34.89 RHINORRHEA: ICD-10-CM

## 2021-04-27 PROCEDURE — G8427 DOCREV CUR MEDS BY ELIG CLIN: HCPCS | Performed by: FAMILY MEDICINE

## 2021-04-27 PROCEDURE — 99214 OFFICE O/P EST MOD 30 MIN: CPT | Performed by: FAMILY MEDICINE

## 2021-04-27 RX ORDER — TIZANIDINE 4 MG/1
TABLET ORAL
Qty: 30 TABLET | Refills: 5 | Status: SHIPPED | OUTPATIENT
Start: 2021-04-27 | End: 2022-02-08 | Stop reason: SDUPTHER

## 2021-04-27 RX ORDER — FLUTICASONE PROPIONATE 50 MCG
2 SPRAY, SUSPENSION (ML) NASAL DAILY
Qty: 3 BOTTLE | Refills: 1 | Status: SHIPPED | OUTPATIENT
Start: 2021-04-27 | End: 2022-03-24

## 2021-04-27 ASSESSMENT — ENCOUNTER SYMPTOMS
ABDOMINAL PAIN: 0
CHEST TIGHTNESS: 0
SINUS PRESSURE: 1
SHORTNESS OF BREATH: 0
WHEEZING: 0
RHINORRHEA: 1
BACK PAIN: 0
COUGH: 0

## 2021-04-27 ASSESSMENT — PATIENT HEALTH QUESTIONNAIRE - PHQ9
2. FEELING DOWN, DEPRESSED OR HOPELESS: 0
SUM OF ALL RESPONSES TO PHQ QUESTIONS 1-9: 0
1. LITTLE INTEREST OR PLEASURE IN DOING THINGS: 0

## 2021-04-27 NOTE — PROGRESS NOTES
2021    TELEHEALTH EVALUATION -- Audio/Visual (During GRQES-31 public health emergency)    TELEHEALTH services provided via DOXYME application for this patient who was at home [address] during the visit and the provider at [address] in [facility]. DOXYME sent 9:36 AM  DOXYME resent 9:40 AM    Time Call began: 9:42 AM  Time Call ended: 10:02 AM    Chief Complaint(s)     Power [employer]urry (:  1980) has requested an audio/video evaluation for the following concern(s):    Chief Complaint   Patient presents with    Anxiety     worsened anxiety and more difficulty concentrating than even before    Nasal Congestion     runny nose and congestion at night       HPI: As above, seeing GI also for issues. Last seen by me in person 2020. With skin/breast nodule and gastritis issues, stress with daughter at home ill with brain tumor AND chronic neuro condition. Hx of panic attacks and has seen counsellor. Since COVID-19  (positive test ) now with runny nose. Also stuffy at night. No coughing or SOB. Its \"driving her crazy. \"  Also had tinnitus. No prior allergy sx in spring. Also losing focus even during conversation, from stress. All kids at home on virtual learning, needing help from her. Worse since covid. She continues to decline medications and does not feel like she has time for even virtual counsellor appt. Denies sadness or depression. Ill daughter Jimbo Her was hospitalized in Sept for 10 days, but eventually came home but more tremors and new spot on her brain. Weight down a bit to 226 with exercise and healthy eating. Started with all liquid diet and now modified to include more food. Review of Systems   Constitutional: Negative for activity change, chills, fatigue and fever. HENT: Positive for congestion, postnasal drip, rhinorrhea and sinus pressure. Respiratory: Negative for cough, chest tightness, shortness of breath and wheezing. Cardiovascular: Negative for chest pain and leg swelling. Gastrointestinal: Negative for abdominal pain. Musculoskeletal: Negative for back pain and myalgias. Skin: Negative for rash. Allergic/Immunologic: Food allergies: at night. Psychiatric/Behavioral: Positive for decreased concentration. Negative for behavioral problems and dysphoric mood. Prior to Visit Medications    Medication Sig Taking? Authorizing Provider   tiZANidine (ZANAFLEX) 4 MG tablet TAKE 1 TABLET BY MOUTH EVERY DAY AT NIGHT Yes Adelina Mckay MD   fluticasone (FLONASE) 50 MCG/ACT nasal spray 2 sprays by Each Nostril route daily Yes Adelina Mckay MD   pantoprazole (PROTONIX) 40 MG tablet Take 1 tablet by mouth 2 times daily Yes MARIA LUZ Worthy CNP   famotidine (PEPCID) 40 MG tablet Take 1 tablet by mouth every evening Yes MARIA LUZ Worthy CNP   ondansetron (ZOFRAN ODT) 4 MG disintegrating tablet Take 1 tablet by mouth every 8 hours as needed for Nausea or Vomiting Yes MARIA LUZ Worthy CNP   Multiple Vitamins-Minerals (MULTI COMPLETE PO) Take by mouth Yes Historical Provider, MD       Social History     Tobacco Use    Smoking status: Former Smoker     Types: Cigarettes     Quit date: 1998     Years since quittin.7    Smokeless tobacco: Never Used   Substance Use Topics    Alcohol use: Yes     Alcohol/week: 0.0 standard drinks     Comment: occ, caffeine-4 servings daily average    Drug use: No            VISIT VITALS AND DATA (may not be from today):  Nursing note reviewed  There were no vitals taken for this visit. BP Readings from Last 3 Encounters:   20 (!) 142/78   20 134/82   20 108/84     Wt Readings from Last 3 Encounters:   20 238 lb 3.2 oz (108 kg)   20 237 lb 3.2 oz (107.6 kg)   20 234 lb (106.1 kg)     There is no height or weight on file to calculate BMI. No results found for this visit on 21.     PATIENT REPORTED VITALS FROM TODAY IF ENTERED:  No flowsheet data found. PHYSICAL EXAMINATION:  [ INSTRUCTIONS:  \"[x]\" Indicates a positive item  \"[]\" Indicates a negative item  -- DELETE ALL ITEMS NOT EXAMINED]    Constitutional:   [x] Appears well-developed and well-nourished Pikk 20 for appointment  [x] No apparent distress    [] Abnormal-     Mental status:  [x] Alert and awake    [x] Oriented to person/place/time   [x] Able to follow commands    [] Abnormal-     Eyes:  EOM     [x]  Normal   [] Abnormal-  Sclera   [x]  Normal   [] Abnormal -  Discharge  [x]  None visible      HEENT:   [x] Normocephalic, atraumatic. [x] Mouth/Throat: Mucous membranes are moist.   [] Abnormal     External Ears:   [x] Normal    [] Abnormal-     Neck:   [x] No visualized mass     Pulmonary/Chest:   [x] Respiratory effort normal.    [x] No visualized signs of difficulty breathing or respiratory distress  [] Abnormal-      Musculoskeletal:    [] Normal gait with no signs of ataxia   [x] Normal range of motion of neck  [] Abnormal-     Neurological:          [x] No Facial Asymmetry (Cranial nerve 7 motor function) (limited exam to video visit)     [x] No gaze palsy   [] Abnormal-         Skin:          [x] No significant exanthematous lesions or discoloration noted on facial skin    [] Abnormal-            Psychiatric:         [x] Normal Affect   [x] No Hallucinations   [] Abnormal-     Other pertinent observable physical exam findings-     ASSESSMENT/PLAN:   Diagnosis Orders   1. Concentration deficit  [address] [place], [place], [facility]   2. History of 2019 novel coronavirus disease (COVID-19)  [address] [place] [address], [place], [facility]   3. Gastroesophageal reflux disease without esophagitis     4. NARCISO (generalized anxiety disorder)  [address] [place], [place], [facility]   5. Panic anxiety syndrome     6. Fibromyalgia  tiZANidine (ZANAFLEX) 4 MG tablet   7.  Rhinorrhea  fluticasone (FLONASE) 50 MCG/ACT nasal spray       Return in

## 2021-04-29 ENCOUNTER — TELEPHONE (OUTPATIENT)
Dept: FAMILY MEDICINE CLINIC | Age: 41
End: 2021-04-29

## 2021-04-29 DIAGNOSIS — R05.9 COUGH: ICD-10-CM

## 2021-04-29 DIAGNOSIS — G44.1 OTHER VASCULAR HEADACHE: Primary | ICD-10-CM

## 2021-04-29 DIAGNOSIS — R07.89 CHEST DISCOMFORT: ICD-10-CM

## 2021-04-29 NOTE — TELEPHONE ENCOUNTER
Pt asking for covid order to [phone]  Pt having cough, HA, chest discomfort. Okay per milla to sign order and faxed.  That is done and pt is aware order was faxed

## 2021-05-05 ENCOUNTER — TELEPHONE (OUTPATIENT)
Dept: FAMILY MEDICINE CLINIC | Age: 41
End: 2021-05-05

## 2021-05-05 NOTE — TELEPHONE ENCOUNTER
Received referral summary report from [facility] stating a referral had been closed, called [phone] ext [phone] to see why.  Lm for call back  For scanned to media

## 2021-06-23 ENCOUNTER — VIRTUAL VISIT (OUTPATIENT)
Dept: PSYCHOLOGY | Age: 41
End: 2021-06-23
Payer: COMMERCIAL

## 2021-06-23 DIAGNOSIS — F41.9 ANXIETY: Primary | ICD-10-CM

## 2021-06-23 PROCEDURE — 90791 PSYCH DIAGNOSTIC EVALUATION: CPT | Performed by: PSYCHOLOGIST

## 2021-06-23 NOTE — PROGRESS NOTES
Patient Location: Home       Provider Location ([facility]/State): [address] [place] [address]       This virtual visit was conducted via interactive/real-time audio/video. Pursuant to the emergency declaration under the [facility], [facility] waiver authority and the [employer] Act, this Virtual  Visit was conducted, with patient's consent, to reduce the patient's risk of exposure to COVID-19 and provide continuity of care for an established patient. Services were provided through a video synchronous discussion virtually to substitute for in-person clinic visit. Additionally, this provider made reasonable effort to verify identify of patient, conducted risk benefit analysis and have determined patient's presenting problem and condition are consistent with the use of telepsychology to patient's benefit, ensured pt has access, knowledge, and skills required to use required technology, obtained alternative means of contacting patient, provided pt with alternative means of contacting provider, reviewed informed consent and obtained verbal agreement in lieu of written informed consent, as such is rendered impossible due to the unexpected nature secondary to COVID-19 clinical recommendations. Behavioral Health Consultation  Dameon Serrano Psy.D. Psychologist    Time spent with Patient: 20 minutes  Visit number: 1  Reason for Consult:  anxiety  Referring Provider: Gama Tomlinson MD  [address]  [address],  [address] [facility]    Informed consent:  Pt provided informed consent for the behavioral health program. Discussed with patient model of service to include the limits of confidentiality (i.e. abuse reporting, suicide intervention, etc.) and focused intervention approach. Pt indicated understanding.     S:  ----------------------------------------------------------------------------------------------------------------------    Pt directed and coherent  Interest/Pleasure: Loss of Pleasure/Fun  Sleep disturbance: Yes  Motivation: Poor  Energy: Tired/Fatigued  Morbid ideation No  Suicide Assessment: no suicidal ideation    A:  ----------------------------------------------------------------------------------------------------------------------  Diagnosis:    1. Anxiety         PHQ Scores 4/27/2021 1/2/2020 7/2/2019 4/2/2019 1/31/2019 10/18/2018 3/2/2018   PHQ2 Score 0 0 0 0 0 0 0   PHQ9 Score 0 0 0 0 0 0 0     Interpretation of Total Score Depression Severity: 1-4 = Minimal depression, 5-9 = Mild depression, 10-14 = Moderate depression, 15-19 = Moderately severe depression, 20-27 = Severe depression    P:  ----------------------------------------------------------------------------------------------------------------------     [x]Discussed potential treatments for   1. Anxiety       [x]Conducted functional assessment  [x]Established rapport  [x]Supportive interventions   [x][place]-setting to identify pt's primary goals for [employer] [facility] visit / overall health  [x]Provided psychoeducation/handout on   1. Anxiety            Other:   []     Pt Behavioral Change Plan:    1. Return to Dr. Raine Brar in 4 week(s)    2.                 Feedback provided to pt's PCP via EPIC and/or oral report

## 2021-06-25 ENCOUNTER — OFFICE VISIT (OUTPATIENT)
Dept: GASTROENTEROLOGY | Age: 41
End: 2021-06-25
Payer: COMMERCIAL

## 2021-06-25 VITALS
HEIGHT: 64 IN | HEART RATE: 78 BPM | DIASTOLIC BLOOD PRESSURE: 82 MMHG | WEIGHT: 228.8 LBS | BODY MASS INDEX: 39.06 KG/M2 | SYSTOLIC BLOOD PRESSURE: 132 MMHG | RESPIRATION RATE: 16 BRPM | OXYGEN SATURATION: 98 %

## 2021-06-25 DIAGNOSIS — R14.0 BLOATING: Primary | ICD-10-CM

## 2021-06-25 DIAGNOSIS — R11.0 NAUSEA: ICD-10-CM

## 2021-06-25 DIAGNOSIS — K21.9 GASTROESOPHAGEAL REFLUX DISEASE WITHOUT ESOPHAGITIS: ICD-10-CM

## 2021-06-25 DIAGNOSIS — R14.0 BLOATING: ICD-10-CM

## 2021-06-25 LAB
A/G RATIO: 1.8 (ref 1.1–2.2)
ALBUMIN SERPL-MCNC: 4.5 G/DL (ref 3.4–5)
ALP BLD-CCNC: 75 U/L (ref 40–129)
ALT SERPL-CCNC: 20 U/L (ref 10–40)
ANION GAP SERPL CALCULATED.3IONS-SCNC: 13 MMOL/L (ref 3–16)
AST SERPL-CCNC: 18 U/L (ref 15–37)
BASOPHILS ABSOLUTE: 0.1 K/UL (ref 0–0.2)
BASOPHILS RELATIVE PERCENT: 0.8 %
BILIRUB SERPL-MCNC: <0.2 MG/DL (ref 0–1)
BUN BLDV-MCNC: 18 MG/DL (ref 7–20)
CALCIUM SERPL-MCNC: 9.7 MG/DL (ref 8.3–10.6)
CHLORIDE BLD-SCNC: 102 MMOL/L (ref 99–110)
CO2: 23 MMOL/L (ref 21–32)
CREAT SERPL-MCNC: 0.6 MG/DL (ref 0.6–1.1)
EOSINOPHILS ABSOLUTE: 0.1 K/UL (ref 0–0.6)
EOSINOPHILS RELATIVE PERCENT: 0.8 %
GFR AFRICAN AMERICAN: >60
GFR NON-AFRICAN AMERICAN: >60
GLOBULIN: 2.5 G/DL
GLUCOSE BLD-MCNC: 95 MG/DL (ref 70–99)
HCT VFR BLD CALC: 33.7 % (ref 36–48)
HEMOGLOBIN: 11.4 G/DL (ref 12–16)
LIPASE: 28 U/L (ref 13–60)
LYMPHOCYTES ABSOLUTE: 1.7 K/UL (ref 1–5.1)
LYMPHOCYTES RELATIVE PERCENT: 22 %
MCH RBC QN AUTO: 30.5 PG (ref 26–34)
MCHC RBC AUTO-ENTMCNC: 33.9 G/DL (ref 31–36)
MCV RBC AUTO: 89.9 FL (ref 80–100)
MONOCYTES ABSOLUTE: 0.5 K/UL (ref 0–1.3)
MONOCYTES RELATIVE PERCENT: 6.8 %
NEUTROPHILS ABSOLUTE: 5.4 K/UL (ref 1.7–7.7)
NEUTROPHILS RELATIVE PERCENT: 69.6 %
PDW BLD-RTO: 13.2 % (ref 12.4–15.4)
PLATELET # BLD: 285 K/UL (ref 135–450)
PMV BLD AUTO: 9.1 FL (ref 5–10.5)
POTASSIUM SERPL-SCNC: 4.7 MMOL/L (ref 3.5–5.1)
RBC # BLD: 3.75 M/UL (ref 4–5.2)
SODIUM BLD-SCNC: 138 MMOL/L (ref 136–145)
TOTAL PROTEIN: 7 G/DL (ref 6.4–8.2)
WBC # BLD: 7.7 K/UL (ref 4–11)

## 2021-06-25 PROCEDURE — G8417 CALC BMI ABV UP PARAM F/U: HCPCS | Performed by: NURSE PRACTITIONER

## 2021-06-25 PROCEDURE — G8427 DOCREV CUR MEDS BY ELIG CLIN: HCPCS | Performed by: NURSE PRACTITIONER

## 2021-06-25 PROCEDURE — 1036F TOBACCO NON-USER: CPT | Performed by: NURSE PRACTITIONER

## 2021-06-25 PROCEDURE — 99213 OFFICE O/P EST LOW 20 MIN: CPT | Performed by: NURSE PRACTITIONER

## 2021-06-25 RX ORDER — PANTOPRAZOLE SODIUM 40 MG/1
40 TABLET, DELAYED RELEASE ORAL 2 TIMES DAILY
Qty: 60 TABLET | Refills: 5 | Status: SHIPPED | OUTPATIENT
Start: 2021-06-25 | End: 2022-03-24 | Stop reason: SDUPTHER

## 2021-06-25 RX ORDER — ONDANSETRON 4 MG/1
4 TABLET, ORALLY DISINTEGRATING ORAL EVERY 8 HOURS PRN
Qty: 30 TABLET | Refills: 5 | Status: SHIPPED | OUTPATIENT
Start: 2021-06-25 | End: 2022-03-24 | Stop reason: SDUPTHER

## 2021-06-25 RX ORDER — FAMOTIDINE 40 MG/1
40 TABLET, FILM COATED ORAL EVERY EVENING
Qty: 30 TABLET | Refills: 5 | Status: SHIPPED | OUTPATIENT
Start: 2021-06-25 | End: 2022-03-24 | Stop reason: SDUPTHER

## 2021-06-25 NOTE — PATIENT INSTRUCTIONS
Patient Education        HIDA Scan: About This Test  What is it? A HIDA scan is an imaging test that checks how your gallbladder is working. The gallbladder is a small sac under your liver. It stores bile, a fluid that helps your body digest fats. If there are problems with the gallbladder, such as gallstones, the gallbladder may not store or empty bile properly. During a HIDA scan, a camera takes pictures of your gallbladder after a radioactive tracer is injected into a vein in your arm. The tracer travels through your liver, gallbladder, bile ducts, and small intestine. The camera takes a series of pictures of the tracer as it moves along. Your doctor can use these pictures to look for leaks, blockages, or any other problems. Why is this test done? The HIDA scan may be done to:  · Help find the cause of pain in the upper belly, especially if the pain is on the right side. · See how well the gallbladder is working. · Find out if bile is leaking. · Find anything that may be blocking the bile ducts. A HIDA scan is sometimes done if an earlier ultrasound test did not give enough information. How do you prepare for the test?  · If you are breastfeeding, you may want to pump enough breast milk before the test to get through 1 to 2 days of feeding. The radioactive tracer used in this test can get into your breast milk and is not good for the baby. · The doctor may tell you not to eat or drink anything but water for 4 to 6 hours before the test. Follow all instructions carefully. If you haven't eaten for more than 24 hours before the test, tell your doctor. How is the test done? · You will remove any clothing around your belly. You will be given a gown or paper covering to use during the test.  · You will lie on your back on a table. · A thin tube, called an IV, will be put into a vein in your arm. · A radioactive tracer chemical will be injected into the IV.  A medicine that stimulates your gallbladder may also be injected. · The scanning camera will be placed close over your belly. · A picture will be taken right away. The whole scan may last up to 60 minutes as the tracer passes through your liver and into your gallbladder and small intestine. Several more pictures, each lasting a few minutes, may be taken over the next 2 to 4 hours. Each picture will take only a few minutes, but you will have to lie still for the whole test.  What are the risks of a HIDA scan? Allergic reactions to the radioactive tracer are rare. Some people may have soreness or swelling where the needle went in. These symptoms can usually be relieved by putting moist, warm compresses on your arm. Anytime you're exposed to radiation, there's a small chance of damage to cells or tissue. That's the case even with the low-level radioactive tracer used for this test. But the chance of damage is very low compared with the benefits of the test.  What happens after the test?  · After the test, drink lots of fluids for the next 24 hours to help flush the tracer out of your body. · The radioactive tracer used in this test can get into your breast milk. Do not breastfeed your baby for 1 or 2 days after this test. During this time, you can give your baby breast milk you stored before the test, or you can give formula. Discard the breast milk you pump in the 1 or 2 days after the test.  · Most of the tracer will leave your body through your urine or stool within a day. So be sure to flush the toilet right after you use it, and wash your hands well with soap and water. The amount of radiation in the tracer is very small. This means it isn't a risk for people to be around you after the test.  · You will probably be able to go home right away. · You can go back to your usual activities right away. · Depending on your results, more scans may be taken up to a day later.  If you need to go back for another HIDA scan, do not eat any fatty foods before the test.  Follow-up care is a key part of your treatment and safety. Be sure to make and go to all appointments, and call your doctor if you are having problems. It's also a good idea to keep a list of the medicines you take. Ask your doctor when you can expect to have your test results. Where can you learn more? Go to https://chpepiceweb.Mimub. org and sign in to your [employer]Launch account. Enter F573 in the Squa[employer]ace box to learn more about \"HIDA Scan: About This Test.\"     If you do not have an account, please click on the \"Sign Up Now\" link. Current as of: September 23, 2020               Content Version: 12.9  © 2006-2021 [employer]. Care instructions adapted under license by [facility] ([facility]). If you have questions about a medical condition or this instruction, always ask your healthcare professional. [place] [address] any warranty or liability for your use of this information. Patient Education        Gastroesophageal Reflux Disease (GERD): Care Instructions  Overview     Gastroesophageal reflux disease (GERD) is the backward flow of stomach acid into the esophagus. The esophagus is the tube that leads from your throat to your stomach. A one-way valve prevents the stomach acid from backing up into this tube. But when you have GERD, this valve does not close tightly enough. This can also cause pain and swelling in your esophagus. (This is called esophagitis.)  If you have mild GERD symptoms including heartburn, you may be able to control the problem with antacids or over-the-counter medicine. You can also make lifestyle changes to help reduce your symptoms. These include changing your diet and eating habits, such as not eating late at night and losing weight. Follow-up care is a key part of your treatment and safety. Be sure to make and go to all appointments, and call your doctor if you are having problems.  It's also a good idea to know your test results and keep a list of the medicines you take. How can you care for yourself at home? · Take your medicines exactly as prescribed. Call your doctor if you think you are having a problem with your medicine. · Your doctor may recommend over-the-counter medicine. For mild or occasional indigestion, antacids, such as Tums, Gaviscon, Mylanta, or Maalox, may help. Your doctor also may recommend over-the-counter acid reducers, such as famotidine (Pepcid AC), cimetidine (Tagamet HB), or omeprazole (Prilosec). Read and follow all instructions on the label. If you use these medicines often, talk with your doctor. · Change your eating habits. ? It's best to eat several small meals instead of two or three large meals. ? After you eat, wait 2 to 3 hours before you lie down. ? Chocolate, mint, and alcohol can make GERD worse. ? Spicy foods, foods that have a lot of acid (like tomatoes and oranges), and coffee can make GERD symptoms worse in some people. If your symptoms are worse after you eat a certain food, you may want to stop eating that food to see if your symptoms get better. · Do not smoke or chew tobacco. Smoking can make GERD worse. If you need help quitting, talk to your doctor about stop-smoking programs and medicines. These can increase your chances of quitting for good. · If you have GERD symptoms at night, raise the head of your bed 6 to 8 inches by putting the frame on blocks or placing a foam wedge under the head of your mattress. (Adding extra pillows does not work.)  · Do not wear tight clothing around your middle. · Lose weight if you need to. Losing just 5 to 10 pounds can help. When should you call for help? Call your doctor now or seek immediate medical care if:    · You have new or different belly pain.     · Your stools are black and tarlike or have streaks of blood.    Watch closely for changes in your health, and be sure to contact your doctor if:    · Your symptoms have not improved after 2 days.     · Food seems to catch in your throat or chest.   Where can you learn more? Go to https://Cyber InternspepicDFineeb.[employer]Stor Systems. org and sign in to your OptixConnect account. Enter M150 in the [facility] box to learn more about \"Gastroesophageal Reflux Disease (GERD): Care Instructions. \"     If you do not have an account, please click on the \"Sign Up Now\" link. Current as of: February 10, 2021               Content Version: 12.9  © 0149-6155 [employer]. Care instructions adapted under license by [facility] ([facility]). If you have questions about a medical condition or this instruction, always ask your healthcare professional. [address] any warranty or liability for your use of this information.

## 2021-06-25 NOTE — PROGRESS NOTES
Gregg Donnelly 39 y.o. female was seen by CRISTINA Alcala on 06/25/21     Wt Readings from Last 3 Encounters:   06/25/21 228 lb 12.8 oz (103.8 kg)   05/19/20 238 lb 3.2 oz (108 kg)   05/11/20 237 lb 3.2 oz (107.6 kg)       HPI  Gregg Donnelly is a pleasant 39 y.o.  female who presents today for follow-up on acid reflux, bloating and epigastric pain. She reports feeling good. Her appetite is good and weight is stable. Her heartburn and acid reflux is controlled with Protonix twice daily. No nocturnal awakenings with acid reflux. No dysphagia or pain with swallowing. She has nausea that occurs four times a week worse in morning. Zofran helps. No vomiting. No abdominal pain. She has bloating on most days  No.excess belching or flatulence.  Her typical bowel pattern is daily to every other day with soft brown formed stools.  No diarrhea or constipation.  No blood in her stools or black tarry stools.  Her maternal grandfather had colon cancer in his 52's.  Her mother had colon polyps removed in the past.      ROS  Review of Systems   Constitutional: Negative for activity change, appetite change, chills, diaphoresis, fatigue, fever and unexpected weight change. HENT: Positive for tinnitus. Negative for ear pain and hearing loss.    Eyes: Negative for pain and visual disturbance. Respiratory: Positive for shortness of breath. Negative for cough and wheezing.    Cardiovascular: Negative for chest pain, palpitations and leg swelling. Gastrointestinal: Negative for abdominal pain, blood in stool, constipation, diarrhea, nausea and vomiting. Endocrine: Negative for cold intolerance and heat intolerance. Genitourinary: Negative for dysuria, frequency and urgency. Musculoskeletal: Positive for myalgias and neck pain. Negative for back pain.        Fibromyalgia   Skin: Negative for color change, pallor and rash.    Allergic/Immunologic: Negative for environmental allergies and food allergies. Neurological: Positive for headaches. Negative for dizziness and seizures. Hematological: Does not bruise/bleed easily. Psychiatric/Behavioral: Negative for dysphoric mood, sleep disturbance and suicidal ideas. The patient is not nervous/anxious.         Allergies  Allergies   Allergen Reactions    Buspar [Buspirone]     Zoloft [Sertraline Hcl]        Medications  Current Outpatient Medications   Medication Sig Dispense Refill    tiZANidine (ZANAFLEX) 4 MG tablet TAKE 1 TABLET BY MOUTH EVERY DAY AT NIGHT 30 tablet 5    fluticasone (FLONASE) 50 MCG/ACT nasal spray 2 sprays by Each Nostril route daily 3 Bottle 1    pantoprazole (PROTONIX) 40 MG tablet Take 1 tablet by mouth 2 times daily 60 tablet 5    famotidine (PEPCID) 40 MG tablet Take 1 tablet by mouth every evening 30 tablet 5    ondansetron (ZOFRAN ODT) 4 MG disintegrating tablet Take 1 tablet by mouth every 8 hours as needed for Nausea or Vomiting 30 tablet 3    Multiple Vitamins-Minerals (MULTI COMPLETE PO) Take by mouth       No current facility-administered medications for this visit. Past medical history:   She has a past medical history of History of pneumonia and Pneumonia of right upper lobe due to infectious organism. Past surgical history:  She has a past surgical history that includes Tubal ligation ();  section (06);  section (11); Upper gastrointestinal endoscopy; Colonoscopy (N/A, 2020); and Upper gastrointestinal endoscopy (N/A, 2020). Social History:  She reports that she quit smoking about 22 years ago. Her smoking use included cigarettes. She has never used smokeless tobacco. She reports current alcohol use. She reports that she does not use drugs. Family history:  Her family history includes Colon Cancer in her maternal grandfather; Drug Abuse in her father; Mult Sclerosis in her sister.     Objective    Vitals:    21 1014   BP: 132/82   Pulse: 78   Resp: 16 31 PG Final    MCHC 07/22/2020 33.1  32.0 - 36.0 % Final    RDW 07/22/2020 14.6  11.7 - 14.9 % Final    Platelets 01/48/8223 307  140 - 440 K/CU MM Final    MPV 07/22/2020 10.3  7.5 - 11.1 FL Final    Differential Type 07/22/2020 AUTOMATED DIFFERENTIAL   Final    Segs Relative 07/22/2020 70.6* 36 - 66 % Final    Lymphocytes % 07/22/2020 19.7* 24 - 44 % Final    Monocytes % 07/22/2020 8.4* 0 - 4 % Final    Eosinophils % 07/22/2020 1.0  0 - 3 % Final    Basophils % 07/22/2020 0.3  0 - 1 % Final    Segs Absolute 07/22/2020 6.1  K/CU MM Final    Lymphocytes Absolute 07/22/2020 1.7  K/CU MM Final    Monocytes Absolute 07/22/2020 0.7  K/CU MM Final    Eosinophils Absolute 07/22/2020 0.1  K/CU MM Final    Basophils Absolute 07/22/2020 0.0  K/CU MM Final    Sodium 07/22/2020 139  135 - 145 MMOL/L Final    Potassium 07/22/2020 4.5  3.5 - 5.1 MMOL/L Final    Chloride 07/22/2020 104  99 - 110 mMol/L Final    CO2 07/22/2020 22  21 - 32 MMOL/L Final    BUN 07/22/2020 9  6 - 23 MG/DL Final    CREATININE 07/22/2020 0.7  0.6 - 1.1 MG/DL Final    Glucose 07/22/2020 130* 70 - 99 MG/DL Final    Calcium 07/22/2020 9.3  8.3 - 10.6 MG/DL Final    Albumin 07/22/2020 4.5  3.4 - 5.0 GM/DL Final    Total Protein 07/22/2020 7.1  6.4 - 8.2 GM/DL Final    Total Bilirubin 07/22/2020 0.3  0.0 - 1.0 MG/DL Final    ALT 07/22/2020 22  10 - 40 U/L Final    AST 07/22/2020 20  15 - 37 IU/L Final    Alkaline Phosphatase 07/22/2020 71  40 - 129 IU/L Final    GFR Non- 07/22/2020 >60  >60 mL/min/1.73m2 Final    GFR  07/22/2020 >60  >60 mL/min/1.73m2 Final    Anion Gap 07/22/2020 13  4 - 16 Final    Ferritin 07/22/2020 19  15 - 150 NG/ML Final    Folate 07/22/2020 >20.0* 3.1 - 17.5 NG/ML Final    LD 07/22/2020 166  120 - 246 IU/L Final    Ristocetin Co-Factor 07/22/2020 47* 51 - 215 % Final    Comment: (NOTE)  REFERENCE INTERVAL: von Willebrand Factor, Activity (RCF)  Access complete set of patient management decisions. Test developed and characteristics determined by [facility]. See Compliance Statement D: [employer]  Performed By: [address]  [place], [address] [facility]  : Juany Hughes. Mariaa River MD, MS         Assessment and Plan:  1.  GERD that is stable without odynophagia or dysphagia.  Her last EGD done on 2-5-2020 showed GEJ biopsies revealed mild chronic inflammation with no evidence of Schreiber's esophagus. Her EGD revealed chronic gastritis with no evidence of peptic ulcer disease.  The patient was encouraged to continue taking Protonix twice daily for the treatment of acid reflux. The patient was encouraged to continue with anti-reflux measures.  Recommend weight loss, avoid eating late at night and avoid foods that trigger acid reflux. 2.  Chronic nausea most likely from acid reflux induced gastritis, diet or medication related. Will order abdominal ultrasound and HIDA scan to rule out gallbladder disease. The patient was encouraged to take Zofran as needed.   3.  Bloating most likely diet related. Will order abdominal ultrasound and HIDA scan to rule out gallbladder disease.   Recommend diet modification and weight loss.   4.  The patient was encouraged to follow-up in 3 months.

## 2021-06-30 ENCOUNTER — OFFICE VISIT (OUTPATIENT)
Dept: FAMILY MEDICINE CLINIC | Age: 41
End: 2021-06-30
Payer: COMMERCIAL

## 2021-06-30 VITALS
WEIGHT: 230 LBS | OXYGEN SATURATION: 98 % | DIASTOLIC BLOOD PRESSURE: 80 MMHG | HEART RATE: 82 BPM | HEIGHT: 64 IN | SYSTOLIC BLOOD PRESSURE: 128 MMHG | BODY MASS INDEX: 39.27 KG/M2

## 2021-06-30 DIAGNOSIS — R41.840 LACK OF CONCENTRATION: Primary | ICD-10-CM

## 2021-06-30 DIAGNOSIS — F41.1 GAD (GENERALIZED ANXIETY DISORDER): ICD-10-CM

## 2021-06-30 DIAGNOSIS — K21.9 GASTROESOPHAGEAL REFLUX DISEASE WITHOUT ESOPHAGITIS: ICD-10-CM

## 2021-06-30 DIAGNOSIS — D64.9 ANEMIA, UNSPECIFIED TYPE: ICD-10-CM

## 2021-06-30 DIAGNOSIS — Z00.00 WELL ADULT EXAM: ICD-10-CM

## 2021-06-30 DIAGNOSIS — Z86.16 HISTORY OF 2019 NOVEL CORONAVIRUS DISEASE (COVID-19): ICD-10-CM

## 2021-06-30 LAB
CHOLESTEROL, FASTING: 197 MG/DL (ref 0–199)
HDLC SERPL-MCNC: 49 MG/DL (ref 40–60)
LDL CHOLESTEROL CALCULATED: 125 MG/DL
TRIGLYCERIDE, FASTING: 116 MG/DL (ref 0–150)
VLDLC SERPL CALC-MCNC: 23 MG/DL

## 2021-06-30 PROCEDURE — G8427 DOCREV CUR MEDS BY ELIG CLIN: HCPCS | Performed by: FAMILY MEDICINE

## 2021-06-30 PROCEDURE — 1036F TOBACCO NON-USER: CPT | Performed by: FAMILY MEDICINE

## 2021-06-30 PROCEDURE — 36415 COLL VENOUS BLD VENIPUNCTURE: CPT | Performed by: FAMILY MEDICINE

## 2021-06-30 PROCEDURE — 99214 OFFICE O/P EST MOD 30 MIN: CPT | Performed by: FAMILY MEDICINE

## 2021-06-30 PROCEDURE — G8417 CALC BMI ABV UP PARAM F/U: HCPCS | Performed by: FAMILY MEDICINE

## 2021-06-30 ASSESSMENT — ENCOUNTER SYMPTOMS
CONSTIPATION: 1
SHORTNESS OF BREATH: 0
BACK PAIN: 0
CHEST TIGHTNESS: 0
COUGH: 0
WHEEZING: 0
ABDOMINAL PAIN: 1
DIARRHEA: 1

## 2021-06-30 NOTE — PROGRESS NOTES
Chief Complaint   Patient presents with    Nasal Congestion     random now in episodes    Memory Loss     forgetting a lot of appointments and mixing up who they are for    Other     cant focus -- states on medication as a child    Gastroesophageal Reflux     IBS and GERD doing better, on meds, last GI note reviewed today       [place] NARRATIVE:    Patient seen by GI and by counseling services already this month. Has f-u in September. Blood pressure is okay but weight is back up. History of ADHD as child was medicated according to patient mom. Still with stress and ill daughter and one child failed online school due to protecting ill child. And hx of previous covid. All adding to distractednees and lack on concentration. Unable to stay on task for housework or appointments. Patient is established unless otherwise noted. Above chief complaint and [place] obtained by this physician provider. Review of Systems   Constitutional: Negative for activity change, chills, fatigue and fever. HENT: Negative for congestion. Respiratory: Negative for cough, chest tightness, shortness of breath and wheezing. Cardiovascular: Negative for chest pain and leg swelling. Gastrointestinal: Positive for abdominal pain (some gastritis and episodes of lower abd pain), constipation and diarrhea. Musculoskeletal: Negative for back pain and myalgias. Skin: Negative for rash. Psychiatric/Behavioral: Positive for decreased concentration. Negative for behavioral problems. The patient is nervous/anxious. The patient is not hyperactive. Allergies   Allergen Reactions    Buspar [Buspirone]     Zoloft [Sertraline Hcl]      Allergy historyupdated.     Outpatient Medications Marked as Taking for the 6/30/21 encounter (Office Visit) with Adelina Mckay MD   Medication Sig Dispense Refill    pantoprazole (PROTONIX) 40 MG tablet Take 1 tablet by mouth 2 times daily 60 tablet 5    famotidine (PEPCID) 40 MG tablet Take 1 tablet by mouth every evening 30 tablet 5    ondansetron (ZOFRAN ODT) 4 MG disintegrating tablet Take 1 tablet by mouth every 8 hours as needed for Nausea or Vomiting 30 tablet 5    tiZANidine (ZANAFLEX) 4 MG tablet TAKE 1 TABLET BY MOUTH EVERY DAY AT NIGHT 30 tablet 5    fluticasone (FLONASE) 50 MCG/ACT nasal spray 2 sprays by Each Nostril route daily 3 Bottle 1    Multiple Vitamins-Minerals (MULTI COMPLETE PO) Take by mouth         Tobacco use history updated. Social History     Tobacco Use   Smoking Status Former Smoker    Types: Cigarettes    Quit date: 1998    Years since quittin.9   Smokeless Tobacco Never Used        Nursing note reviewed. Vitals:    21 1033   BP: 128/80   Site: Left Upper Arm   Position: Sitting   Cuff Size: Medium Adult   Pulse: 82   SpO2: 98%   Weight: 230 lb (104.3 kg)   Height: 5' 4\" (1.626 m)          BP Readings from Last 3 Encounters:   21 128/80   21 132/82   20 (!) 142/78     Wt Readings from Last 3 Encounters:   21 230 lb (104.3 kg)   21 228 lb 12.8 oz (103.8 kg)   20 238 lb 3.2 oz (108 kg)     Body mass index is 39.48 kg/m². No results found for this visit on 21. Physical Exam  Vitals and nursing note reviewed. Constitutional:       General: She is not in acute distress. Appearance: She is well-developed. She is not diaphoretic. HENT:      Head: Normocephalic and atraumatic. Eyes:      Conjunctiva/sclera: Conjunctivae normal.      Pupils: Pupils are equal, round, and reactive to light. Cardiovascular:      Rate and Rhythm: Normal rate and regular rhythm. Heart sounds: Normal heart sounds. No murmur heard. Pulmonary:      Effort: Pulmonary effort is normal. No respiratory distress. Breath sounds: Normal breath sounds. No wheezing. Skin:     General: Skin is warm and dry. Neurological:      Mental Status: She is alert and oriented to person, place, and time.

## 2021-07-14 ENCOUNTER — OFFICE VISIT (OUTPATIENT)
Dept: FAMILY MEDICINE CLINIC | Age: 41
End: 2021-07-14
Payer: COMMERCIAL

## 2021-07-14 VITALS
WEIGHT: 230 LBS | BODY MASS INDEX: 39.48 KG/M2 | HEART RATE: 83 BPM | DIASTOLIC BLOOD PRESSURE: 84 MMHG | OXYGEN SATURATION: 98 % | SYSTOLIC BLOOD PRESSURE: 120 MMHG

## 2021-07-14 DIAGNOSIS — R41.840 LACK OF CONCENTRATION: ICD-10-CM

## 2021-07-14 DIAGNOSIS — F90.2 ATTENTION DEFICIT HYPERACTIVITY DISORDER (ADHD), COMBINED TYPE: Primary | ICD-10-CM

## 2021-07-14 PROCEDURE — 99213 OFFICE O/P EST LOW 20 MIN: CPT | Performed by: FAMILY MEDICINE

## 2021-07-14 PROCEDURE — 1036F TOBACCO NON-USER: CPT | Performed by: FAMILY MEDICINE

## 2021-07-14 PROCEDURE — G8427 DOCREV CUR MEDS BY ELIG CLIN: HCPCS | Performed by: FAMILY MEDICINE

## 2021-07-14 PROCEDURE — G8417 CALC BMI ABV UP PARAM F/U: HCPCS | Performed by: FAMILY MEDICINE

## 2021-07-14 RX ORDER — DEXTROAMPHETAMINE SACCHARATE, AMPHETAMINE ASPARTATE MONOHYDRATE, DEXTROAMPHETAMINE SULFATE AND AMPHETAMINE SULFATE 2.5; 2.5; 2.5; 2.5 MG/1; MG/1; MG/1; MG/1
10 CAPSULE, EXTENDED RELEASE ORAL DAILY
Qty: 30 CAPSULE | Refills: 0 | Status: SHIPPED | OUTPATIENT
Start: 2021-07-14 | End: 2021-09-14

## 2021-07-14 NOTE — PROGRESS NOTES
Chief Complaint   Patient presents with    Other     3 week follow up on ADD        [place] NARRATIVE:     and adult children are making her take a vacation with her female family members. She is caring at home for younger children and her disabled daughter with multiple health conditions. She has reported that she was treated for ADHD in childhood with a medication that begins with \"R\" but did not continue it into teenage in adulthood. She apparently has multiple siblings who are also treated with ADHD medications. Normally she is very organized but her memory and attention to detail is suffered and she has missed many appointments she says. So we send her with a screener for ADHD to complete at home. She brings it back and it is scanned and she does screen very positive for attention deficit and hyperactivity. She did have her first counselling appointment (virtually), and with the therapist she concluded her worries are valid. He feels he can be helpful. He believes medication would be a good option. Patient is established unless otherwise noted. Above chief complaint and [place] obtained by this physician provider. Review of Systems   Constitutional: Negative for activity change, chills, fatigue and fever. HENT: Negative for congestion. Respiratory: Negative for cough, chest tightness, shortness of breath and wheezing. Cardiovascular: Negative for chest pain and leg swelling. Gastrointestinal: Negative for abdominal pain. Musculoskeletal: Negative for back pain and myalgias. Skin: Negative for rash. Psychiatric/Behavioral: Positive for decreased concentration and dysphoric mood. Negative for behavioral problems. The patient is nervous/anxious. Allergies   Allergen Reactions    Buspar [Buspirone]     Zoloft [Sertraline Hcl]      Allergy historyupdated.     Outpatient Medications Marked as Taking for the 7/14/21 encounter (Office Visit) with Brinda Haynes MD   Medication Sig Dispense Refill    amphetamine-dextroamphetamine (ADDERALL XR) 10 MG extended release capsule Take 1 capsule by mouth daily for 30 days. 30 capsule 0    pantoprazole (PROTONIX) 40 MG tablet Take 1 tablet by mouth 2 times daily 60 tablet 5    famotidine (PEPCID) 40 MG tablet Take 1 tablet by mouth every evening 30 tablet 5    ondansetron (ZOFRAN ODT) 4 MG disintegrating tablet Take 1 tablet by mouth every 8 hours as needed for Nausea or Vomiting 30 tablet 5    tiZANidine (ZANAFLEX) 4 MG tablet TAKE 1 TABLET BY MOUTH EVERY DAY AT NIGHT 30 tablet 5    fluticasone (FLONASE) 50 MCG/ACT nasal spray 2 sprays by Each Nostril route daily 3 Bottle 1    Multiple Vitamins-Minerals (MULTI COMPLETE PO) Take by mouth         Tobacco use history updated. Social History     Tobacco Use   Smoking Status Former Smoker    Types: Cigarettes    Quit date: 1998    Years since quittin.9   Smokeless Tobacco Never Used        Nursing note reviewed. Vitals:    21 1013   BP: 120/84   Site: Left Upper Arm   Position: Sitting   Cuff Size: Medium Adult   Pulse: 83   SpO2: 98%   Weight: 230 lb (104.3 kg)          BP Readings from Last 3 Encounters:   21 120/84   21 128/80   21 132/82     Wt Readings from Last 3 Encounters:   21 230 lb (104.3 kg)   21 230 lb (104.3 kg)   21 228 lb 12.8 oz (103.8 kg)     Body mass index is 39.48 kg/m². No results found for this visit on 21. Physical Exam  Vitals and nursing note reviewed. Constitutional:       General: She is not in acute distress. Appearance: She is well-developed. She is not diaphoretic. HENT:      Head: Normocephalic and atraumatic. Eyes:      Conjunctiva/sclera: Conjunctivae normal.      Pupils: Pupils are equal, round, and reactive to light. Cardiovascular:      Rate and Rhythm: Normal rate and regular rhythm. Pulmonary:      Effort: Pulmonary effort is normal. No respiratory distress.    Skin: General: Skin is warm and dry. Neurological:      Mental Status: She is alert and oriented to person, place, and time. Psychiatric:         Behavior: Behavior normal.           _________________________________________________  Assessment:     1. Attention deficit hyperactivity disorder (ADHD), combined type  -     amphetamine-dextroamphetamine (ADDERALL XR) 10 MG extended release capsule; Take 1 capsule by mouth daily for 30 days. , Disp-30 capsule, R-0Normal  2. Lack of concentration    We will provide a trial of Adderall medication as above. We gave her a starting dose appropriate for an adult. She is advised to give it a week or 2 but she may feel improvement within a few days. She should definitely continue with the counselor. We will see how she does and recheck her and adjust dose as needed. See orders above and comments below for details of workup or medication orders. _________________________________________________  Plan:     I told her she could can double if no benefit. And let me know. Return if symptoms worsen or fail to improve, for recheck on new med.       Electronically signed by Juan Farley MD on 7/14/21 at 10:18 AM EDT

## 2021-07-15 ASSESSMENT — ENCOUNTER SYMPTOMS
ABDOMINAL PAIN: 0
WHEEZING: 0
COUGH: 0
CHEST TIGHTNESS: 0
SHORTNESS OF BREATH: 0
BACK PAIN: 0

## 2021-08-11 ENCOUNTER — OFFICE VISIT (OUTPATIENT)
Dept: FAMILY MEDICINE CLINIC | Age: 41
End: 2021-08-11
Payer: COMMERCIAL

## 2021-08-11 VITALS
DIASTOLIC BLOOD PRESSURE: 78 MMHG | SYSTOLIC BLOOD PRESSURE: 104 MMHG | WEIGHT: 227 LBS | OXYGEN SATURATION: 99 % | HEART RATE: 84 BPM | BODY MASS INDEX: 38.96 KG/M2

## 2021-08-11 DIAGNOSIS — Z28.21 COVID-19 VACCINATION REFUSED: ICD-10-CM

## 2021-08-11 DIAGNOSIS — F90.2 ATTENTION DEFICIT HYPERACTIVITY DISORDER (ADHD), COMBINED TYPE: Primary | ICD-10-CM

## 2021-08-11 DIAGNOSIS — Z86.16 HISTORY OF 2019 NOVEL CORONAVIRUS DISEASE (COVID-19): ICD-10-CM

## 2021-08-11 PROCEDURE — 99213 OFFICE O/P EST LOW 20 MIN: CPT | Performed by: FAMILY MEDICINE

## 2021-08-11 PROCEDURE — G8427 DOCREV CUR MEDS BY ELIG CLIN: HCPCS | Performed by: FAMILY MEDICINE

## 2021-08-11 PROCEDURE — 1036F TOBACCO NON-USER: CPT | Performed by: FAMILY MEDICINE

## 2021-08-11 PROCEDURE — G8417 CALC BMI ABV UP PARAM F/U: HCPCS | Performed by: FAMILY MEDICINE

## 2021-08-11 RX ORDER — VALACYCLOVIR HYDROCHLORIDE 1 G/1
1 TABLET, FILM COATED ORAL DAILY
COMMUNITY
Start: 2020-10-02

## 2021-08-11 RX ORDER — METHYLPHENIDATE HYDROCHLORIDE 27 MG/1
27 TABLET ORAL DAILY
Qty: 30 TABLET | Refills: 0 | Status: SHIPPED | OUTPATIENT
Start: 2021-08-11 | End: 2021-09-14

## 2021-08-11 NOTE — PROGRESS NOTES
Chief Complaint   Patient presents with    1 Month Follow-Up     ADHD       [place] NARRATIVE:    Doesn't feel a whole lot different yet. Still forgetful she says, unchanged really. Also reporting headaches and lighter sleeping. Headaches worse at start now are lessened, and not occurring every day. Patient is established unless otherwise noted. Above chief complaint and [place] obtained by this physician provider. Review of Systems   Constitutional: Negative for activity change, chills, fatigue and fever. HENT: Negative for congestion. Respiratory: Negative for cough, chest tightness, shortness of breath and wheezing. Cardiovascular: Negative for chest pain and leg swelling. Gastrointestinal: Negative for abdominal pain. Musculoskeletal: Negative for back pain and myalgias. Skin: Negative for rash. Neurological: Positive for headaches. Psychiatric/Behavioral: Positive for decreased concentration, dysphoric mood and sleep disturbance. Negative for behavioral problems. Allergies   Allergen Reactions    Buspar [Buspirone]     Zoloft [Sertraline Hcl]      Allergy historyupdated. Outpatient Medications Marked as Taking for the 8/11/21 encounter (Office Visit) with Juan Farley MD   Medication Sig Dispense Refill    valACYclovir (VALTREX) 1 g tablet Take 1 g by mouth daily      methylphenidate (CONCERTA) 27 MG extended release tablet Take 1 tablet by mouth daily for 30 days. 30 tablet 0    amphetamine-dextroamphetamine (ADDERALL XR) 10 MG extended release capsule Take 1 capsule by mouth daily for 30 days.  30 capsule 0    pantoprazole (PROTONIX) 40 MG tablet Take 1 tablet by mouth 2 times daily 60 tablet 5    famotidine (PEPCID) 40 MG tablet Take 1 tablet by mouth every evening 30 tablet 5    ondansetron (ZOFRAN ODT) 4 MG disintegrating tablet Take 1 tablet by mouth every 8 hours as needed for Nausea or Vomiting 30 tablet 5    tiZANidine (ZANAFLEX) 4 MG tablet TAKE 1 TABLET BY MOUTH EVERY DAY AT NIGHT 30 tablet 5    fluticasone (FLONASE) 50 MCG/ACT nasal spray 2 sprays by Each Nostril route daily 3 Bottle 1    Multiple Vitamins-Minerals (MULTI COMPLETE PO) Take by mouth         Tobacco use history updated. Social History     Tobacco Use   Smoking Status Former Smoker    Types: Cigarettes    Quit date: 1998    Years since quittin.0   Smokeless Tobacco Never Used        Nursing note reviewed. Vitals:    21 1052   BP: 104/78   Site: Left Upper Arm   Position: Sitting   Cuff Size: Medium Adult   Pulse: 84   SpO2: 99%   Weight: 227 lb (103 kg)          BP Readings from Last 3 Encounters:   21 104/78   21 120/84   21 128/80     Wt Readings from Last 3 Encounters:   21 227 lb (103 kg)   21 230 lb (104.3 kg)   21 230 lb (104.3 kg)     Body mass index is 38.96 kg/m². No results found for this visit on 21. Physical Exam  Vitals and nursing note reviewed. Constitutional:       General: She is not in acute distress. Appearance: She is well-developed. She is not diaphoretic. HENT:      Head: Normocephalic and atraumatic. Eyes:      Conjunctiva/sclera: Conjunctivae normal.      Pupils: Pupils are equal, round, and reactive to light. Cardiovascular:      Rate and Rhythm: Normal rate and regular rhythm. Pulmonary:      Effort: Pulmonary effort is normal. No respiratory distress. Skin:     General: Skin is warm and dry. Neurological:      Mental Status: She is alert and oriented to person, place, and time. Psychiatric:         Behavior: Behavior normal.           _________________________________________________  Assessment:     1. Attention deficit hyperactivity disorder (ADHD), combined type  -     methylphenidate (CONCERTA) 27 MG extended release tablet; Take 1 tablet by mouth daily for 30 days. , Disp-30 tablet, R-0Normal  2. History of 2019 novel coronavirus disease (COVID-19)  3.  COVID-19 vaccination refused    I would rather not boost the Adderall since she has had no benefit at all and she has had the headaches. We will switch her to Concerta. Prescription is provided as above. We discussed Covid vaccination for several minutes. She is reluctant. However she has a daughter at home with severe neurological compromise who is not presently recommended to get the vaccine by her neurologist.  I recommended she protect this child by getting her vaccine. She will consider. See orders above and comments below for details of workup or medication orders. _________________________________________________  Plan:     Return in about 4 weeks (around 9/8/2021), or if symptoms worsen or fail to improve, for recheck on new medication.       Electronically signed by Cony Reid MD on 8/11/21 at 11:11 AM EDT

## 2021-08-13 ASSESSMENT — ENCOUNTER SYMPTOMS
SHORTNESS OF BREATH: 0
ABDOMINAL PAIN: 0
WHEEZING: 0
BACK PAIN: 0
CHEST TIGHTNESS: 0
COUGH: 0

## 2021-09-14 ENCOUNTER — OFFICE VISIT (OUTPATIENT)
Dept: FAMILY MEDICINE CLINIC | Age: 41
End: 2021-09-14
Payer: COMMERCIAL

## 2021-09-14 ENCOUNTER — VIRTUAL VISIT (OUTPATIENT)
Dept: PSYCHOLOGY | Age: 41
End: 2021-09-14
Payer: COMMERCIAL

## 2021-09-14 VITALS
SYSTOLIC BLOOD PRESSURE: 120 MMHG | WEIGHT: 236 LBS | BODY MASS INDEX: 40.51 KG/M2 | HEART RATE: 98 BPM | DIASTOLIC BLOOD PRESSURE: 74 MMHG | OXYGEN SATURATION: 98 %

## 2021-09-14 DIAGNOSIS — F43.10 PTSD (POST-TRAUMATIC STRESS DISORDER): Primary | ICD-10-CM

## 2021-09-14 DIAGNOSIS — Z86.16 HISTORY OF 2019 NOVEL CORONAVIRUS DISEASE (COVID-19): ICD-10-CM

## 2021-09-14 DIAGNOSIS — F41.9 ANXIETY: ICD-10-CM

## 2021-09-14 DIAGNOSIS — F90.2 ATTENTION DEFICIT HYPERACTIVITY DISORDER (ADHD), COMBINED TYPE: Primary | ICD-10-CM

## 2021-09-14 PROCEDURE — G8427 DOCREV CUR MEDS BY ELIG CLIN: HCPCS | Performed by: FAMILY MEDICINE

## 2021-09-14 PROCEDURE — 1036F TOBACCO NON-USER: CPT | Performed by: FAMILY MEDICINE

## 2021-09-14 PROCEDURE — G8417 CALC BMI ABV UP PARAM F/U: HCPCS | Performed by: FAMILY MEDICINE

## 2021-09-14 PROCEDURE — 99214 OFFICE O/P EST MOD 30 MIN: CPT | Performed by: FAMILY MEDICINE

## 2021-09-14 PROCEDURE — 90832 PSYTX W PT 30 MINUTES: CPT | Performed by: PSYCHOLOGIST

## 2021-09-14 RX ORDER — DEXTROAMPHETAMINE SACCHARATE, AMPHETAMINE ASPARTATE, DEXTROAMPHETAMINE SULFATE AND AMPHETAMINE SULFATE 1.25; 1.25; 1.25; 1.25 MG/1; MG/1; MG/1; MG/1
5 TABLET ORAL 2 TIMES DAILY
Qty: 60 TABLET | Refills: 0 | Status: SHIPPED | OUTPATIENT
Start: 2021-09-14 | End: 2021-10-25 | Stop reason: SDUPTHER

## 2021-09-14 ASSESSMENT — ENCOUNTER SYMPTOMS
COUGH: 0
CHEST TIGHTNESS: 0
BACK PAIN: 0
SHORTNESS OF BREATH: 0
WHEEZING: 0
ABDOMINAL PAIN: 0

## 2021-09-14 NOTE — PROGRESS NOTES
Patient Location: Home       Provider Location ([facility]/State): Shantanu Perez       This virtual visit was conducted via interactive/real-time audio/video. Pursuant to the emergency declaration under the [facility], [facility] waiver authority and the [employer] Act, this Virtual  Visit was conducted, with patient's consent, to reduce the patient's risk of exposure to COVID-19 and provide continuity of care for an established patient. Services were provided through a video synchronous discussion virtually to substitute for in-person clinic visit. Additionally, this provider made reasonable effort to verify identify of patient, conducted risk benefit analysis and have determined patient's presenting problem and condition are consistent with the use of telepsychology to patient's benefit, ensured pt has access, knowledge, and skills required to use required technology, obtained alternative means of contacting patient, provided pt with alternative means of contacting provider, reviewed informed consent and obtained verbal agreement in lieu of written informed consent, as such is rendered impossible due to the unexpected nature secondary to COVID-19 clinical recommendations. Behavioral Health Consultation  Bayron Mejia Psy.D. Psychologist      Time spent with Patient: 30 minutes  Visit number: 2  Reason for Consult:  anxiety  Referring Provider: Girma Parikh MD  [address]  [address],  [address] [facility]    S:  ----------------------------------------------------------------------------------------------------------------------  anxiety  \"I've been freaking out and overwhelmed. \" Is \"terrified\" abt sending children to school d/t covid. Has been emotionally labile and \"crying all the time. \" She and  are disagreeing abt how to manage covid and children.  Has been mixing up and missing appointments, forgetting items at grocery store, absentminded. \"I'm always worried about whether she's going to get sick again, and if she's going to make it through. \" Son recently fractured arm riding one-wheel skateboard     Anxious rumination and racing thoughts abt 20yo niece's health. Hypervigilance. \"I feel like I failed. \"     ONGOING:  -17yo daughter's (bio niece) health  -disagreement w  abt how to manage covid    O:  ----------------------------------------------------------------------------------------------------------------------  MSE:  Orientation:  oriented to person, place, time, and general circumstances  Appearance and behavior:  alert, cooperative  Speech:  spontaneous, normal rate and normal volume  Mood: anxious   Thought Content:  intact  Thought Process:  linear, goal directed and coherent  Interest/Pleasure: Loss of Pleasure/Fun  Sleep disturbance: Yes  Motivation: Poor  Energy: Tired/Fatigued  Morbid ideation No  Suicide Assessment: no suicidal ideation    A:  ----------------------------------------------------------------------------------------------------------------------  Diagnosis:    1. PTSD (post-traumatic stress disorder)         PHQ Scores 4/27/2021 1/2/2020 7/2/2019 4/2/2019 1/31/2019 10/18/2018 3/2/2018   PHQ2 Score 0 0 0 0 0 0 0   PHQ9 Score 0 0 0 0 0 0 0     Interpretation of Total Score Depression Severity: 1-4 = Minimal depression, 5-9 = Mild depression, 10-14 = Moderate depression, 15-19 = Moderately severe depression, 20-27 = Severe depression    P:  ----------------------------------------------------------------------------------------------------------------------    General:   [x] [place]-setting to identify pt's primary goals for [facility] visit / overall health   [x] Provided psychoeducation/handout on:   1.  PTSD (post-traumatic stress disorder)        [x]  Supportive interventions    Cognitive:   [x] Trained in strategies for increasing balanced thinking   [x] Cognitive strategies to target current mental health sx    [x] Identified and challenged maladaptive thoughts    Behavioral:   [x] Discussed and set plan for behavioral activation   [x] Discussed and problem-solved barriers in adhering to behavioral change plan   [x] Motivational Interviewing to increase patient confidence and compliance with       adhering to behavioral change plan   [x] Discussed potential barriers to change   [x] Discussed self-care (sleep, nutrition, rewarding activities, social support, exercise)    Other:   []   []   []   []    Recommendations to patient:    1. Return to Dr. Vanessa Russo in 4 week(s)    2.                   Feedback provided to pt's PCP via EPIC and/or oral report

## 2021-09-14 NOTE — PROGRESS NOTES
Chief Complaint   Patient presents with    1 Month Follow-Up     ADHD    Discuss Medications     concerta pt does not think medication is working well, caused swelling and redness in face pt reports no pain just tightness        [place] NARRATIVE:    Leaves tomorrow with whole family to [place] [facility], driving. Excited but anxious. Also has been holding kids out of school x 2 weeks due to daughter's health and risk of exposure. Family is not vaccinated and patient is not either -- but she had covid illness. Dr. Sue Paris thinks she may have PTSD from her daughter severe illness and hospitalization last year. Med side effect as above. It took about 24 hours to go away. Now off both medications for a few weeks. Her concentration and remembering is worse than ever, she purchased an cell scanned and entire grocery order but then left it at the store. Her  says the medication was helpful and she was doing better on it but she had headaches especially initially from the Adderall and this facial swelling from the Concerta. Patient is established unless otherwise noted. Above chief complaint and [place] obtained by this physician provider. Review of Systems   Constitutional: Negative for activity change, chills, fatigue and fever. HENT: Negative for congestion. Respiratory: Negative for cough, chest tightness, shortness of breath and wheezing. Cardiovascular: Negative for chest pain and leg swelling. Gastrointestinal: Negative for abdominal pain. Musculoskeletal: Negative for back pain and myalgias. Skin: Negative for rash. Psychiatric/Behavioral: Positive for sleep disturbance. Negative for behavioral problems and dysphoric mood. The patient is nervous/anxious. Allergies   Allergen Reactions    Buspar [Buspirone]     Zoloft [Sertraline Hcl]      Allergy historyupdated.     Outpatient Medications Marked as Taking for the 9/14/21 encounter (Office Visit) with Santa Maravilla MD   Medication Sig Dispense Refill    amphetamine-dextroamphetamine (ADDERALL, 5MG,) 5 MG tablet Take 1 tablet by mouth 2 times daily for 30 days. (6-8 hours apart) 60 tablet 0    valACYclovir (VALTREX) 1 g tablet Take 1 g by mouth daily      pantoprazole (PROTONIX) 40 MG tablet Take 1 tablet by mouth 2 times daily 60 tablet 5    famotidine (PEPCID) 40 MG tablet Take 1 tablet by mouth every evening 30 tablet 5    ondansetron (ZOFRAN ODT) 4 MG disintegrating tablet Take 1 tablet by mouth every 8 hours as needed for Nausea or Vomiting 30 tablet 5    tiZANidine (ZANAFLEX) 4 MG tablet TAKE 1 TABLET BY MOUTH EVERY DAY AT NIGHT 30 tablet 5    fluticasone (FLONASE) 50 MCG/ACT nasal spray 2 sprays by Each Nostril route daily 3 Bottle 1    Multiple Vitamins-Minerals (MULTI COMPLETE PO) Take by mouth         Tobacco use history updated. Social History     Tobacco Use   Smoking Status Former Smoker    Types: Cigarettes    Quit date: 1998    Years since quittin.1   Smokeless Tobacco Never Used        Nursing note reviewed. Vitals:    21 1345   BP: 120/74   Site: Left Upper Arm   Position: Sitting   Cuff Size: Medium Adult   Pulse: 98   SpO2: 98%   Weight: 236 lb (107 kg)          BP Readings from Last 3 Encounters:   21 120/74   21 104/78   21 120/84     Wt Readings from Last 3 Encounters:   21 236 lb (107 kg)   21 227 lb (103 kg)   21 230 lb (104.3 kg)     Body mass index is 40.51 kg/m². No results found for this visit on 21. Physical Exam  Vitals and nursing note reviewed. Constitutional:       General: She is not in acute distress. Appearance: She is well-developed. She is not diaphoretic. HENT:      Head: Normocephalic and atraumatic. Eyes:      Conjunctiva/sclera: Conjunctivae normal.      Pupils: Pupils are equal, round, and reactive to light. Cardiovascular:      Rate and Rhythm: Normal rate and regular rhythm.    Pulmonary: Effort: Pulmonary effort is normal. No respiratory distress. Skin:     General: Skin is warm and dry. Neurological:      Mental Status: She is alert and oriented to person, place, and time. Psychiatric:         Behavior: Behavior normal.           _________________________________________________  Assessment:     1. Attention deficit hyperactivity disorder (ADHD), combined type  -     amphetamine-dextroamphetamine (ADDERALL, 5MG,) 5 MG tablet; Take 1 tablet by mouth 2 times daily for 30 days. (6-8 hours apart), Disp-60 tablet, R-0Normal  2. History of 2019 novel coronavirus disease (COVID-19)  3. Anxiety    So we will try her on Adderall plain 5 mg about 6 to 8 hours apart. She was given 1 prescription of this to her pharmacy. I offered to refer her to [facility] and she declined for now but if she does not do well on this trial I think they would benefit her for better medication selection and support. For now she feels like Dr. Abel Oakley has been very helpful and she wants to continue with him also. Support given for anxiety and family stressors. She has a history of actually having COVID-19 herself but has declined vaccination. Problems listed above are stable and therapeutic plan is unchanged unless otherwise specified. See orders above and comments below for details of workup or medication orders. _________________________________________________  Plan:     Agree with Dr. Zhanna Logan recommendation for her to limit phone and internet access due to PTSD and anxiety, especially while on vacation. If not doing well at next checkup we will send to [facility] for help. Return if symptoms worsen or fail to improve, for recheck on new med.       Electronically signed by Genaro Jensen MD on 9/14/21 at 1:59 PM EDT

## 2021-10-05 ENCOUNTER — VIRTUAL VISIT (OUTPATIENT)
Dept: PSYCHOLOGY | Age: 41
End: 2021-10-05
Payer: COMMERCIAL

## 2021-10-05 DIAGNOSIS — F42.2 MIXED OBSESSIONAL THOUGHTS AND ACTS: ICD-10-CM

## 2021-10-05 DIAGNOSIS — F43.10 PTSD (POST-TRAUMATIC STRESS DISORDER): Primary | ICD-10-CM

## 2021-10-05 PROCEDURE — 90832 PSYTX W PT 30 MINUTES: CPT | Performed by: PSYCHOLOGIST

## 2021-10-05 NOTE — PROGRESS NOTES
Patient Location: Home       Provider Location ([facility]/State): Keira Edward       This virtual visit was conducted via interactive/real-time audio/video. Pursuant to the emergency declaration under the [facility], [facility] waiver authority and the [employer] and [employer] Act, this Virtual  Visit was conducted, with patient's consent, to reduce the patient's risk of exposure to COVID-19 and provide continuity of care for an established patient. Services were provided through a video synchronous discussion virtually to substitute for in-person clinic visit. Additionally, this provider made reasonable effort to verify identify of patient, conducted risk benefit analysis and have determined patient's presenting problem and condition are consistent with the use of telepsychology to patient's benefit, ensured pt has access, knowledge, and skills required to use required technology, obtained alternative means of contacting patient, provided pt with alternative means of contacting provider, reviewed informed consent and obtained verbal agreement in lieu of written informed consent, as such is rendered impossible due to the unexpected nature secondary to COVID-19 clinical recommendations. Behavioral Health Consultation  Bayron Barragan Psy.D. Psychologist      Time spent with Patient: 30 minutes  Visit number: 3  Reason for Consult:  anxiety  Referring Provider: Ny Benito MD  [address]  [address],  [address] [facility]    S:  ----------------------------------------------------------------------------------------------------------------------  Anxiety  \"Things have been pretty good. \" She has \"stepped away\" from following the news and has been watching less covid coverage. Has found this \"really helpful. \" Recent trip to [place] was \"a lot better than expected. \"     Continues obsessive cleaning and sx monitoring of family. behavioral activation   [x] Discussed and problem-solved barriers in adhering to behavioral change plan   [x] Motivational Interviewing to increase patient confidence and compliance with       adhering to behavioral change plan   [x] Discussed potential barriers to change   [x] Discussed self-care (sleep, nutrition, rewarding activities, social support, exercise)    Other:   []   []   []   []    Recommendations to patient:    1. Return to Dr. Sinan Zamora in 4 week(s)    2.                   Feedback provided to pt's PCP via EPIC and/or oral report

## 2021-10-07 ENCOUNTER — OFFICE VISIT (OUTPATIENT)
Dept: GASTROENTEROLOGY | Age: 41
End: 2021-10-07
Payer: COMMERCIAL

## 2021-10-07 VITALS
BODY MASS INDEX: 41.32 KG/M2 | HEIGHT: 64 IN | SYSTOLIC BLOOD PRESSURE: 118 MMHG | OXYGEN SATURATION: 96 % | TEMPERATURE: 97.7 F | WEIGHT: 242 LBS | HEART RATE: 77 BPM | DIASTOLIC BLOOD PRESSURE: 72 MMHG

## 2021-10-07 DIAGNOSIS — R14.0 BLOATING: ICD-10-CM

## 2021-10-07 DIAGNOSIS — R11.0 NAUSEA: ICD-10-CM

## 2021-10-07 DIAGNOSIS — E66.01 CLASS 3 SEVERE OBESITY DUE TO EXCESS CALORIES WITHOUT SERIOUS COMORBIDITY WITH BODY MASS INDEX (BMI) OF 40.0 TO 44.9 IN ADULT (HCC): ICD-10-CM

## 2021-10-07 DIAGNOSIS — K21.9 GASTROESOPHAGEAL REFLUX DISEASE WITHOUT ESOPHAGITIS: Primary | ICD-10-CM

## 2021-10-07 PROCEDURE — G8427 DOCREV CUR MEDS BY ELIG CLIN: HCPCS | Performed by: NURSE PRACTITIONER

## 2021-10-07 PROCEDURE — 1036F TOBACCO NON-USER: CPT | Performed by: NURSE PRACTITIONER

## 2021-10-07 PROCEDURE — G8484 FLU IMMUNIZE NO ADMIN: HCPCS | Performed by: NURSE PRACTITIONER

## 2021-10-07 PROCEDURE — G8417 CALC BMI ABV UP PARAM F/U: HCPCS | Performed by: NURSE PRACTITIONER

## 2021-10-07 PROCEDURE — 99212 OFFICE O/P EST SF 10 MIN: CPT | Performed by: NURSE PRACTITIONER

## 2021-10-07 NOTE — PROGRESS NOTES
Rory Mckeon 39 y.o. female was seen by CRISTINA Holm on 10/7/2021     Wt Readings from Last 3 Encounters:   10/07/21 242 lb (109.8 kg)   09/14/21 236 lb (107 kg)   08/11/21 227 lb (103 kg)       HPI  Rory Mckeon is a pleasant 39 y.o.  female who presents today for follow-up on acid reflux, bloating and epigastric pain. She denies NSAID use. She reports feeling better. She did not complete her abdominal ultrasound or HIDA scan. She has an episode of epigastric pain the last week of July waking up around 0300 with burning pain in her epigastric area with nausea for a couple hours. She recalled feeling better an hour later with no further episodes. Her heartburn and acid reflux is controlled with Protonix and Pepcid. No nocturnal awakenings with acid reflux. No dysphagia or pain with swallowing. Her appetite is good and weight is up due to Adderall. No current nausea or vomiting. No abdominal pain. Mild bloating that is non-worsening. Intermittent nausea in the morning upon awakening. Zofran helps and takes once daily. No vomiting. No excess belching or flatulence. Her bowel pattern is daily with soft brown formed stools. No diarrhea or constipation. No blood in her stools or melena. Her maternal grandfather had colon cancer in his 52's and mother had colon polyps removed in the past.    ROS  Review of Systems   Constitutional: Negative for activity change, appetite change, chills, diaphoresis, fatigue, fever and unexpected weight change. HENT: Positive for tinnitus. Negative for ear pain and hearing loss.    Eyes: Negative for pain and visual disturbance. Respiratory: Positive for shortness of breath. Negative for cough and wheezing.    Cardiovascular: Negative for chest pain, palpitations and leg swelling. Gastrointestinal: Negative for abdominal pain, blood in stool, constipation, diarrhea, nausea and vomiting.    Endocrine: Negative for cold intolerance and heat intolerance. Genitourinary: Negative for dysuria, frequency and urgency. Musculoskeletal: Positive for myalgias and neck pain. Negative for back pain.        Fibromyalgia   Skin: Negative for color change, pallor and rash. Allergic/Immunologic: Negative for environmental allergies and food allergies. Neurological: Positive for headaches. Negative for dizziness and seizures. Hematological: Does not bruise/bleed easily. Psychiatric/Behavioral: Negative for dysphoric mood, sleep disturbance and suicidal ideas. The patient is not nervous/anxious.         Allergies  Allergies   Allergen Reactions    Buspar [Buspirone]     Zoloft [Sertraline Hcl]        Medications  Current Outpatient Medications   Medication Sig Dispense Refill    amphetamine-dextroamphetamine (ADDERALL, 5MG,) 5 MG tablet Take 1 tablet by mouth 2 times daily for 30 days. (6-8 hours apart) 60 tablet 0    valACYclovir (VALTREX) 1 g tablet Take 1 g by mouth daily      pantoprazole (PROTONIX) 40 MG tablet Take 1 tablet by mouth 2 times daily 60 tablet 5    famotidine (PEPCID) 40 MG tablet Take 1 tablet by mouth every evening 30 tablet 5    ondansetron (ZOFRAN ODT) 4 MG disintegrating tablet Take 1 tablet by mouth every 8 hours as needed for Nausea or Vomiting 30 tablet 5    tiZANidine (ZANAFLEX) 4 MG tablet TAKE 1 TABLET BY MOUTH EVERY DAY AT NIGHT 30 tablet 5    Multiple Vitamins-Minerals (MULTI COMPLETE PO) Take by mouth      fluticasone (FLONASE) 50 MCG/ACT nasal spray 2 sprays by Each Nostril route daily (Patient not taking: Reported on 10/7/2021) 3 Bottle 1     No current facility-administered medications for this visit. Past medical history:   She has a past medical history of History of pneumonia and Pneumonia of right upper lobe due to infectious organism. Past surgical history:  She has a past surgical history that includes Tubal ligation ();  section (06);  section (11);  Upper gastrointestinal endoscopy; Colonoscopy (N/A, 2/5/2020); and Upper gastrointestinal endoscopy (N/A, 2/5/2020). Social History:  She reports that she quit smoking about 23 years ago. Her smoking use included cigarettes. She has never used smokeless tobacco. She reports current alcohol use. She reports that she does not use drugs. Family history:  Her family history includes Colon Cancer in her maternal grandfather; Drug Abuse in her father; Mult Sclerosis in her sister. Objective    Vitals:    10/07/21 1120   BP: 118/72   Pulse: 77   Temp: 97.7 °F (36.5 °C)   SpO2: 96%        Physical exam    Physical Exam  Vitals reviewed. Constitutional:       General: She is not in acute distress. Appearance: She is well-developed. She is obese. She is not ill-appearing or toxic-appearing. HENT:      Head: Normocephalic and atraumatic. Nose: Nose normal.      Mouth/Throat:      Mouth: Mucous membranes are moist.   Eyes:      Conjunctiva/sclera: Conjunctivae normal.      Pupils: Pupils are equal, round, and reactive to light. Neck:      Thyroid: No thyromegaly. Vascular: No JVD. Trachea: No tracheal deviation. Cardiovascular:      Rate and Rhythm: Normal rate and regular rhythm. Pulses: Normal pulses. Heart sounds: Normal heart sounds. No murmur heard. No friction rub. No gallop. Pulmonary:      Effort: Pulmonary effort is normal. No respiratory distress. Breath sounds: Normal breath sounds. No stridor. No wheezing, rhonchi or rales. Chest:      Chest wall: No tenderness. Abdominal:      General: Bowel sounds are normal. There is no distension. Palpations: Abdomen is soft. There is no mass. Tenderness: There is no abdominal tenderness. There is no guarding or rebound. Hernia: No hernia is present. Musculoskeletal:         General: Normal range of motion. Cervical back: Normal range of motion and neck supple.    Lymphadenopathy:      Cervical: No

## 2021-10-07 NOTE — PATIENT INSTRUCTIONS
Patient Education        HIDA Scan: About This Test  What is it? A HIDA scan is an imaging test that checks how your gallbladder is working. The gallbladder is a small sac under your liver. It stores bile, a fluid that helps your body digest fats. If there are problems with the gallbladder, such as gallstones, the gallbladder may not store or empty bile properly. During a HIDA scan, a camera takes pictures of your gallbladder after a radioactive tracer is injected into a vein in your arm. The tracer travels through your liver, gallbladder, bile ducts, and small intestine. The camera takes a series of pictures of the tracer as it moves along. Your doctor can use these pictures to look for leaks, blockages, or any other problems. Why is this test done? The HIDA scan may be done to:  · Help find the cause of pain in the upper belly, especially if the pain is on the right side. · See how well the gallbladder is working. · Find out if bile is leaking. · Find anything that may be blocking the bile ducts. A HIDA scan is sometimes done if an earlier ultrasound test did not give enough information. How do you prepare for the test?  · If you are breastfeeding, you may want to pump enough breast milk before the test to get through 1 to 2 days of feeding. The radioactive tracer used in this test can get into your breast milk and is not good for the baby. · The doctor may tell you not to eat or drink anything but water for 4 to 6 hours before the test. Follow all instructions carefully. If you haven't eaten for more than 24 hours before the test, tell your doctor. How is the test done? · You will remove any clothing around your belly. You will be given a gown or paper covering to use during the test.  · You will lie on your back on a table. · A thin tube, called an IV, will be put into a vein in your arm. · A radioactive tracer chemical will be injected into the IV.  A medicine that stimulates your gallbladder may also be injected. · The scanning camera will be placed close over your belly. · A picture will be taken right away. The whole scan may last up to 60 minutes as the tracer passes through your liver and into your gallbladder and small intestine. Several more pictures, each lasting a few minutes, may be taken over the next 2 to 4 hours. Each picture will take only a few minutes, but you will have to lie still for the whole test.  What are the risks of a HIDA scan? Allergic reactions to the radioactive tracer are rare. Some people may have soreness or swelling where the needle went in. These symptoms can usually be relieved by putting moist, warm compresses on your arm. Anytime you're exposed to radiation, there's a small chance of damage to cells or tissue. That's the case even with the low-level radioactive tracer used for this test. But the chance of damage is very low compared with the benefits of the test.  What happens after the test?  · After the test, drink lots of fluids for the next 24 hours to help flush the tracer out of your body. · The radioactive tracer used in this test can get into your breast milk. Do not breastfeed your baby for 1 or 2 days after this test. During this time, you can give your baby breast milk you stored before the test, or you can give formula. Discard the breast milk you pump in the 1 or 2 days after the test.  · Most of the tracer will leave your body through your urine or stool within a day. So be sure to flush the toilet right after you use it, and wash your hands well with soap and water. The amount of radiation in the tracer is very small. This means it isn't a risk for people to be around you after the test.  · You will probably be able to go home right away. · You can go back to your usual activities right away. · Depending on your results, more scans may be taken up to a day later.  If you need to go back for another HIDA scan, do not eat any fatty foods before the test.  Follow-up care is a key part of your treatment and safety. Be sure to make and go to all appointments, and call your doctor if you are having problems. It's also a good idea to keep a list of the medicines you take. Ask your doctor when you can expect to have your test results. Where can you learn more? Go to https://chpepiceweb.Kiio. org and sign in to your [email or web address] account. Enter F573 in the [employer] box to learn more about \"HIDA Scan: About This Test.\"     If you do not have an account, please click on the \"Sign Up Now\" link. Current as of: June 17, 2021               Content Version: 13.0  © 2006-2021 [employer]what. Care instructions adapted under license by [facility] ([facility]). If you have questions about a medical condition or this instruction, always ask your healthcare professional. [place] [address] any warranty or liability for your use of this information. Patient Education        Gastroesophageal Reflux Disease (GERD): Care Instructions  Overview     Gastroesophageal reflux disease (GERD) is the backward flow of stomach acid into the esophagus. The esophagus is the tube that leads from your throat to your stomach. A one-way valve prevents the stomach acid from backing up into this tube. But when you have GERD, this valve does not close tightly enough. This can also cause pain and swelling in your esophagus. (This is called esophagitis.)  If you have mild GERD symptoms including heartburn, you may be able to control the problem with antacids or over-the-counter medicine. You can also make lifestyle changes to help reduce your symptoms. These include changing your diet and eating habits, such as not eating late at night and losing weight. Follow-up care is a key part of your treatment and safety. Be sure to make and go to all appointments, and call your doctor if you are having problems.  It's also a good idea to know your test results and keep a list of the medicines you take. How can you care for yourself at home? · Take your medicines exactly as prescribed. Call your doctor if you think you are having a problem with your medicine. · Your doctor may recommend over-the-counter medicine. For mild or occasional indigestion, antacids, such as Tums, Gaviscon, Mylanta, or Maalox, may help. Your doctor also may recommend over-the-counter acid reducers, such as famotidine (Pepcid AC), cimetidine (Tagamet HB), or omeprazole (Prilosec). Read and follow all instructions on the label. If you use these medicines often, talk with your doctor. · Change your eating habits. ? It's best to eat several small meals instead of two or three large meals. ? After you eat, wait 2 to 3 hours before you lie down. ? Chocolate, mint, and alcohol can make GERD worse. ? Spicy foods, foods that have a lot of acid (like tomatoes and oranges), and coffee can make GERD symptoms worse in some people. If your symptoms are worse after you eat a certain food, you may want to stop eating that food to see if your symptoms get better. · Do not smoke or chew tobacco. Smoking can make GERD worse. If you need help quitting, talk to your doctor about stop-smoking programs and medicines. These can increase your chances of quitting for good. · If you have GERD symptoms at night, raise the head of your bed 6 to 8 inches by putting the frame on blocks or placing a foam wedge under the head of your mattress. (Adding extra pillows does not work.)  · Do not wear tight clothing around your middle. · Lose weight if you need to. Losing just 5 to 10 pounds can help. When should you call for help? Call your doctor now or seek immediate medical care if:    · You have new or different belly pain.     · Your stools are black and tarlike or have streaks of blood. Watch closely for changes in your health, and be sure to contact your doctor if:    · Your symptoms have not improved after 2 days.   · Food seems to catch in your throat or chest.   Where can you learn more? Go to https://chpepiceweb.Sharingforce. org and sign in to your [employer] account. Enter M445 in the [facility] box to learn more about \"Gastroesophageal Reflux Disease (GERD): Care Instructions. \"     If you do not have an account, please click on the \"Sign Up Now\" link. Current as of: February 10, 2021               Content Version: 13.0  © 2006-2021 mWater. Care instructions adapted under license by [facility] ([facility]). If you have questions about a medical condition or this instruction, always ask your healthcare professional. [place] [address] any warranty or liability for your use of this information. Patient Education        Nausea and Vomiting: Care Instructions  Your Care Instructions     When you are nauseated, you may feel weak and sweaty and notice a lot of saliva in your mouth. Nausea often leads to vomiting. Most of the time you do not need to worry about nausea and vomiting, but they can be signs of other illnesses. Two common causes of nausea and vomiting are stomach flu and food poisoning. Nausea and vomiting from viral stomach flu will usually start to improve within 24 hours. Nausea and vomiting from food poisoning may last from 12 to 48 hours. The doctor has checked you carefully, but problems can develop later. If you notice any problems or new symptoms, get medical treatment right away. Follow-up care is a key part of your treatment and safety. Be sure to make and go to all appointments, and call your doctor if you are having problems. It's also a good idea to know your test results and keep a list of the medicines you take. How can you care for yourself at home? · To prevent dehydration, drink plenty of fluids. Choose water and other clear liquids until you feel better.  If you have kidney, heart, or liver disease and have to limit fluids, talk with your doctor before you increase the amount of fluids you drink. · Rest in bed until you feel better. · When you are able to eat, try clear soups, mild foods, and liquids until all symptoms are gone for 12 to 48 hours. Other good choices include dry toast, crackers, cooked cereal, and gelatin dessert, such as Jell-O. When should you call for help? Call 911 anytime you think you may need emergency care. For example, call if:    · You passed out (lost consciousness). Call your doctor now or seek immediate medical care if:    · You have symptoms of dehydration, such as:  ? Dry eyes and a dry mouth. ? Passing only a little urine. ? Feeling thirstier than usual.     · You have new or worsening belly pain.     · You have a new or higher fever.     · You vomit blood or what looks like coffee grounds. Watch closely for changes in your health, and be sure to contact your doctor if:    · You have ongoing nausea and vomiting.     · Your vomiting is getting worse.     · Your vomiting lasts longer than 2 days.     · You are not getting better as expected. Where can you learn more? Go to https://[email or web address].SampleBoard. org and sign in to your [employer] account. Enter 13 [id number] in the [facility] box to learn more about \"Nausea and Vomiting: Care Instructions. \"     If you do not have an account, please click on the \"Sign Up Now\" link. Current as of: July 1, 2021               Content Version: 13.0  © 9304-4549 [employer], [facility]. Care instructions adapted under license by [facility] ([facility]). If you have questions about a medical condition or this instruction, always ask your healthcare professional. [place] [address] any warranty or liability for your use of this information.

## 2021-10-25 DIAGNOSIS — F90.2 ATTENTION DEFICIT HYPERACTIVITY DISORDER (ADHD), COMBINED TYPE: ICD-10-CM

## 2021-10-25 RX ORDER — DEXTROAMPHETAMINE SACCHARATE, AMPHETAMINE ASPARTATE, DEXTROAMPHETAMINE SULFATE AND AMPHETAMINE SULFATE 1.25; 1.25; 1.25; 1.25 MG/1; MG/1; MG/1; MG/1
5 TABLET ORAL 2 TIMES DAILY
Qty: 60 TABLET | Refills: 0 | Status: SHIPPED | OUTPATIENT
Start: 2021-10-25 | End: 2021-11-23 | Stop reason: SDUPTHER

## 2021-10-26 ENCOUNTER — VIRTUAL VISIT (OUTPATIENT)
Dept: PSYCHOLOGY | Age: 41
End: 2021-10-26
Payer: COMMERCIAL

## 2021-10-26 DIAGNOSIS — F43.10 PTSD (POST-TRAUMATIC STRESS DISORDER): Primary | ICD-10-CM

## 2021-10-26 DIAGNOSIS — F42.2 MIXED OBSESSIONAL THOUGHTS AND ACTS: ICD-10-CM

## 2021-10-26 PROCEDURE — 90832 PSYTX W PT 30 MINUTES: CPT | Performed by: PSYCHOLOGIST

## 2021-10-26 NOTE — PROGRESS NOTES
Patient Location: Home       Provider Location ([facility]): [facility]       This virtual visit was conducted via interactive/real-time audio/video. Pursuant to the emergency declaration under the [facility], [facility] waiver authority and the [employer] and [employer] Act, this Virtual  Visit was conducted, with patient's consent, to reduce the patient's risk of exposure to COVID-19 and provide continuity of care for an established patient. Services were provided through a video synchronous discussion virtually to substitute for in-person clinic visit. Additionally, this provider made reasonable effort to verify identify of patient, conducted risk benefit analysis and have determined patient's presenting problem and condition are consistent with the use of telepsychology to patient's benefit, ensured pt has access, knowledge, and skills required to use required technology, obtained alternative means of contacting patient, provided pt with alternative means of contacting provider, reviewed informed consent and obtained verbal agreement in lieu of written informed consent, as such is rendered impossible due to the unexpected nature secondary to COVID-19 clinical recommendations. Behavioral Health Consultation  Bayron Espinal Psy.D. Psychologist      Time spent with Patient: 20 minutes  Visit number: 4  Reason for Consult:  anxiety  Referring Provider: Adrienne Martínez MD  [address]  [address],  [address] [facility]    S:  ----------------------------------------------------------------------------------------------------------------------  Anxiety  \"Things aren't great right now. \" Niece and her daughter were at pt's house today and began coughing and reporting sx of illness. Pt learned of this and left work to go home and \"disinfect. \" Concerned she will \"spend the rest of the day worrying. \" Prior to today she has been \"more calm. \" She has been less ruminative over the past 2 weeks, until today. She and  continue to disagree on covid precautions. Noticing incr rumination abt 26yo daughter with health concerns. Will ask her hourly how she is feeling and check her temp while she is sleeping. Ongoing sense of medical hypervigilance over daughter. ONGOING:  -26yo daughter's health, mentally fxing of 7yo  -disagreement w  abt how to manage covid    O:  ----------------------------------------------------------------------------------------------------------------------  MSE:  Orientation:  oriented to person, place, time, and general circumstances  Appearance and behavior:  alert, cooperative  Speech:  spontaneous, normal rate and normal volume  Mood: anxious   Thought Content:  intact  Thought Process:  linear, goal directed and coherent  Interest/Pleasure: Loss of Pleasure/Fun  Sleep disturbance: Yes  Motivation: Poor  Energy: Tired/Fatigued  Morbid ideation No  Suicide Assessment: no suicidal ideation    A:  ----------------------------------------------------------------------------------------------------------------------  Diagnosis:    1. PTSD (post-traumatic stress disorder)    2. Mixed obsessional thoughts and acts         PHQ Scores 4/27/2021 1/2/2020 7/2/2019 4/2/2019 1/31/2019 10/18/2018 3/2/2018   PHQ2 Score 0 0 0 0 0 0 0   PHQ9 Score 0 0 0 0 0 0 0     Interpretation of Total Score Depression Severity: 1-4 = Minimal depression, 5-9 = Mild depression, 10-14 = Moderate depression, 15-19 = Moderately severe depression, 20-27 = Severe depression    P:  ----------------------------------------------------------------------------------------------------------------------    General:   [x] [place]-setting to identify pt's primary goals for [facility] visit / overall health   [x] Provided psychoeducation/handout on:   1. PTSD (post-traumatic stress disorder)    2.  Mixed obsessional thoughts and acts        [x] Supportive interventions    Cognitive:   [x] Trained in strategies for increasing balanced thinking   [x] Cognitive strategies to target current mental health sx    [x] Identified and challenged maladaptive thoughts    Behavioral:   [x] Discussed and set plan for behavioral activation   [x] Discussed and problem-solved barriers in adhering to behavioral change plan   [x] Motivational Interviewing to increase patient confidence and compliance with       adhering to behavioral change plan   [x] Discussed potential barriers to change   [x] Discussed self-care (sleep, nutrition, rewarding activities, social support, exercise)    Other:   []   []   []   []    Recommendations to patient:    1. Return to  [facility] in 4 week(s)    2.                   Feedback provided to pt's PCP via EPIC and/or oral report

## 2021-10-29 ENCOUNTER — OFFICE VISIT (OUTPATIENT)
Dept: FAMILY MEDICINE CLINIC | Age: 41
End: 2021-10-29
Payer: COMMERCIAL

## 2021-10-29 VITALS
BODY MASS INDEX: 41.2 KG/M2 | DIASTOLIC BLOOD PRESSURE: 82 MMHG | OXYGEN SATURATION: 98 % | HEART RATE: 78 BPM | WEIGHT: 240 LBS | SYSTOLIC BLOOD PRESSURE: 112 MMHG

## 2021-10-29 DIAGNOSIS — F90.2 ATTENTION DEFICIT HYPERACTIVITY DISORDER (ADHD), COMBINED TYPE: Primary | ICD-10-CM

## 2021-10-29 PROCEDURE — G8484 FLU IMMUNIZE NO ADMIN: HCPCS | Performed by: FAMILY MEDICINE

## 2021-10-29 PROCEDURE — 99213 OFFICE O/P EST LOW 20 MIN: CPT | Performed by: FAMILY MEDICINE

## 2021-10-29 PROCEDURE — G8427 DOCREV CUR MEDS BY ELIG CLIN: HCPCS | Performed by: FAMILY MEDICINE

## 2021-10-29 PROCEDURE — G8417 CALC BMI ABV UP PARAM F/U: HCPCS | Performed by: FAMILY MEDICINE

## 2021-10-29 PROCEDURE — 1036F TOBACCO NON-USER: CPT | Performed by: FAMILY MEDICINE

## 2021-10-29 NOTE — PROGRESS NOTES
Chief Complaint   Patient presents with    ADHD     follow up on medication change, pt reports improvement and no issues at this time        [place] NARRATIVE:    Patient reports relief in some of her distractedness and feels she is better organized. Some of her emotional distress is also improved. No side effects or insomnia. She does not feel like she has complete relief but is better enough that she would prefer to continue this dose. BP stable, weight marginally decreased. Patient is established unless otherwise noted. Above chief complaint and [place] obtained by this physician provider. Review of Systems   Constitutional: Negative for activity change, chills, fatigue and fever. HENT: Negative for congestion. Respiratory: Negative for cough, chest tightness, shortness of breath and wheezing. Cardiovascular: Negative for chest pain and leg swelling. Gastrointestinal: Negative for abdominal pain. Musculoskeletal: Negative for back pain and myalgias. Skin: Negative for rash. Psychiatric/Behavioral: Negative for behavioral problems and dysphoric mood. Allergies   Allergen Reactions    Buspar [Buspirone]     Zoloft [Sertraline Hcl]      Allergy historyupdated. Outpatient Medications Marked as Taking for the 10/29/21 encounter (Office Visit) with Rc Villafuerte MD   Medication Sig Dispense Refill    amphetamine-dextroamphetamine (ADDERALL, 5MG,) 5 MG tablet Take 1 tablet by mouth 2 times daily for 30 days.  (6-8 hours apart) 60 tablet 0    valACYclovir (VALTREX) 1 g tablet Take 1 g by mouth daily      pantoprazole (PROTONIX) 40 MG tablet Take 1 tablet by mouth 2 times daily 60 tablet 5    famotidine (PEPCID) 40 MG tablet Take 1 tablet by mouth every evening 30 tablet 5    ondansetron (ZOFRAN ODT) 4 MG disintegrating tablet Take 1 tablet by mouth every 8 hours as needed for Nausea or Vomiting 30 tablet 5    tiZANidine (ZANAFLEX) 4 MG tablet TAKE 1 TABLET BY MOUTH EVERY DAY AT NIGHT 30 tablet 5    fluticasone (FLONASE) 50 MCG/ACT nasal spray 2 sprays by Each Nostril route daily 3 Bottle 1    Multiple Vitamins-Minerals (MULTI COMPLETE PO) Take by mouth         Tobacco use history updated. Social History     Tobacco Use   Smoking Status Former Smoker    Types: Cigarettes    Quit date: 1998    Years since quittin.3   Smokeless Tobacco Never Used        Nursing note reviewed. Vitals:    10/29/21 1139   BP: 112/82   Site: Left Upper Arm   Position: Sitting   Cuff Size: Medium Adult   Pulse: 78   SpO2: 98%   Weight: 240 lb (108.9 kg)          BP Readings from Last 3 Encounters:   10/29/21 112/82   10/07/21 118/72   21 120/74     Wt Readings from Last 3 Encounters:   10/29/21 240 lb (108.9 kg)   10/07/21 242 lb (109.8 kg)   21 236 lb (107 kg)     Body mass index is 41.2 kg/m². No results found for this visit on 10/29/21. Physical Exam  Vitals and nursing note reviewed. Constitutional:       General: She is not in acute distress. Appearance: She is well-developed. She is not diaphoretic. HENT:      Head: Normocephalic and atraumatic. Eyes:      Conjunctiva/sclera: Conjunctivae normal.      Pupils: Pupils are equal, round, and reactive to light. Cardiovascular:      Rate and Rhythm: Normal rate and regular rhythm. Pulmonary:      Effort: Pulmonary effort is normal. No respiratory distress. Skin:     General: Skin is warm and dry. Neurological:      Mental Status: She is alert and oriented to person, place, and time. Psychiatric:         Behavior: Behavior normal.           _________________________________________________  Assessment:     1. Attention deficit hyperactivity disorder (ADHD), combined type    Problems listed above are stable and therapeutic plan is unchanged unless otherwise specified.       See orders above and comments below for details of workup or medication orders. _________________________________________________  Plan:     Recommended CXDVB-21-07 vaccine, she has a disabled daughter with neurological issues. They are still carefully isolating. Return in about 10 weeks (around 1/7/2022), or if symptoms worsen or fail to improve, for recheck and refill controlled med.       Electronically signed by Etta Miguel MD on 10/29/21 at 12:04 PM EDT

## 2021-11-02 ENCOUNTER — TELEPHONE (OUTPATIENT)
Dept: FAMILY MEDICINE CLINIC | Age: 41
End: 2021-11-02

## 2021-11-13 ASSESSMENT — ENCOUNTER SYMPTOMS
BACK PAIN: 0
SHORTNESS OF BREATH: 0
WHEEZING: 0
CHEST TIGHTNESS: 0
COUGH: 0
ABDOMINAL PAIN: 0

## 2021-11-22 DIAGNOSIS — F90.2 ATTENTION DEFICIT HYPERACTIVITY DISORDER (ADHD), COMBINED TYPE: ICD-10-CM

## 2021-11-23 RX ORDER — DEXTROAMPHETAMINE SACCHARATE, AMPHETAMINE ASPARTATE, DEXTROAMPHETAMINE SULFATE AND AMPHETAMINE SULFATE 1.25; 1.25; 1.25; 1.25 MG/1; MG/1; MG/1; MG/1
5 TABLET ORAL 2 TIMES DAILY
Qty: 60 TABLET | Refills: 0 | Status: SHIPPED | OUTPATIENT
Start: 2021-11-23 | End: 2022-02-08 | Stop reason: SDUPTHER

## 2021-12-10 ENCOUNTER — OFFICE VISIT (OUTPATIENT)
Dept: FAMILY MEDICINE CLINIC | Age: 41
End: 2021-12-10
Payer: COMMERCIAL

## 2021-12-10 ENCOUNTER — HOSPITAL ENCOUNTER (OUTPATIENT)
Age: 41
Setting detail: SPECIMEN
Discharge: HOME | End: 2021-12-10
Payer: COMMERCIAL

## 2021-12-10 VITALS
BODY MASS INDEX: 42.57 KG/M2 | RESPIRATION RATE: 12 BRPM | TEMPERATURE: 97.3 F | OXYGEN SATURATION: 98 % | HEART RATE: 90 BPM | WEIGHT: 248 LBS

## 2021-12-10 DIAGNOSIS — J06.9 VIRAL URI WITH COUGH: Primary | ICD-10-CM

## 2021-12-10 DIAGNOSIS — J02.9 SORE THROAT: ICD-10-CM

## 2021-12-10 LAB
INFLUENZA A ANTIBODY: NORMAL
INFLUENZA B ANTIBODY: NORMAL
S PYO AG THROAT QL: NORMAL

## 2021-12-10 PROCEDURE — U0003 INFECTIOUS AGENT DETECTION BY NUCLEIC ACID (DNA OR RNA); SEVERE ACUTE RESPIRATORY SYNDROME CORONAVIRUS 2 (SARS-COV-2) (CORONAVIRUS DISEASE [COVID-19]), AMPLIFIED PROBE TECHNIQUE, MAKING USE OF HIGH THROUGHPUT TECHNOLOGIES AS DESCRIBED BY CMS-2020-01-R: HCPCS

## 2021-12-10 PROCEDURE — G8427 DOCREV CUR MEDS BY ELIG CLIN: HCPCS | Performed by: PHYSICIAN ASSISTANT

## 2021-12-10 PROCEDURE — 87880 STREP A ASSAY W/OPTIC: CPT | Performed by: PHYSICIAN ASSISTANT

## 2021-12-10 PROCEDURE — 87804 INFLUENZA ASSAY W/OPTIC: CPT | Performed by: PHYSICIAN ASSISTANT

## 2021-12-10 PROCEDURE — G8417 CALC BMI ABV UP PARAM F/U: HCPCS | Performed by: PHYSICIAN ASSISTANT

## 2021-12-10 PROCEDURE — 1036F TOBACCO NON-USER: CPT | Performed by: PHYSICIAN ASSISTANT

## 2021-12-10 PROCEDURE — G8484 FLU IMMUNIZE NO ADMIN: HCPCS | Performed by: PHYSICIAN ASSISTANT

## 2021-12-10 PROCEDURE — 99213 OFFICE O/P EST LOW 20 MIN: CPT | Performed by: PHYSICIAN ASSISTANT

## 2021-12-10 PROCEDURE — U0005 INFEC AGEN DETEC AMPLI PROBE: HCPCS

## 2021-12-10 NOTE — PROGRESS NOTES
12/10/21  Janet Meter  1980    FLU/COVID-19 CLINIC EVALUATION    HPI SYMPTOMS:    Employer:  [employer]  [x] Fevers 100.4 2 days ago  [] Chills  [x] Cough  [] Coughing up blood  [] Chest Congestion  [x] Nasal Congestion  [] Feeling short of breath  [] Sometimes  [] Frequently  [] All the time  [] Chest pain  [] Headaches  []Tolerable  [] Severe  [x] Sore throat  [x] Muscle aches  [] Nausea  [] Vomiting  []Unable to keep fluids down  [] Diarrhea  []Severe    [] OTHER SYMPTOMS:      Symptom Duration:   [] 1  [x] 2   [] 3   [] 4    [] 5   [] 6   [] 7   [] 8   [] 9   [] 10   [] 11   [] 12   [] 13   [] 14   [] Longer than 14 days    Symptom course:   [] Worsening     [x] Stable     [] Improving    RISK FACTORS:    [] Pregnant or possibly pregnant  [] Age over 61  [] Diabetes  [] Heart disease  [] Asthma  [] COPD/Other chronic lung diseases  [] Active Cancer  [] On Chemotherapy  [] Taking oral steroids  [] History Lymphoma/Leukemia  [] Close contact with a lab confirmed COVID-19 patient within 14 days of symptom onset  [] History of travel from affected geographical areas within 14 days of symptom onset       VITALS:  There were no vitals filed for this visit. TESTS:    POCT FLU:  [] Positive     []Negative    ASSESSMENT:    [] Flu  [] Possible COVID-19  [] Strep    PLAN:    [] Discharge home with written instructions for:  [] Flu management  [] Possible COVID-19 infection with self-quarantine and management of symptoms  [] Follow-up with primary care physician or emergency department if worsens  [] Evaluation per physician/NP/PA in clinic  [] Sent to ER       An  electronic signature was used to authenticate this note.      --Denae Romero MA on 12/10/2021 at 2:11 PM

## 2021-12-10 NOTE — PATIENT INSTRUCTIONS
Your COVID 19 test can take 1-5 days for the results to come back. We ask that you make a Packetzoom page and view your test results this way. You will need to Self quarantine until you know your results. Rapid strep test negative, throat culture pending  Rapid influenza test negative  PCR Covid test pending  Increase fluids and rest  Saline nasal spray as needed for nasal congestion  Warm salt gargles as needed for throat discomfort  Monitor temperature twice a day  Tylenol as needed for fevers and/or discomfort. Big deep breaths periodically throughout the day  Okay to take over-the-counter cough and cold medications as needed  If symptoms worsen -Go to the ER. Follow up with your primary care provider      To Whom it May Concern:    Olesya Saldivar was tested for COVID-19 12/10/2021. He/she must stay home until test results are back. If test is positive, he/she must quarantine for a total of 10 days starting from day one of symptom onset. He/she must also be fever-free for 24 hours at that time, and also have improvement in symptoms. We do not recommend retesting as patients may continue to test positive for months even though no longer contagious. It is suggested you call [facility] or [address] [place] [address] with any questions regarding quarantine timeframe/return to work/school details. Rachelle Lynch PA-C      Patient Education        Viral Respiratory Infection: Care Instructions  Your Care Instructions     Viruses are very small organisms. They grow in number after they enter your body. There are many types that cause different illnesses, such as colds and the mumps. The symptoms of a viral respiratory infection often start quickly. They include a fever, sore throat, and runny nose. You may also just not feel well. Or you may not want to eat much. Most viral respiratory infections are not serious. They usually get better with time and self-care.   Antibiotics are not used to treat a viral infection. That's because antibiotics will not help cure a viral illness. In some cases, antiviral medicine can help your body fight a serious viral infection. Follow-up care is a key part of your treatment and safety. Be sure to make and go to all appointments, and call your doctor if you are having problems. It's also a good idea to know your test results and keep a list of the medicines you take. How can you care for yourself at home? · Rest as much as possible until you feel better. · Be safe with medicines. Take your medicine exactly as prescribed. Call your doctor if you think you are having a problem with your medicine. You will get more details on the specific medicine your doctor prescribes. · Take an over-the-counter pain medicine, such as acetaminophen (Tylenol), ibuprofen (Advil, Motrin), or naproxen (Aleve), as needed for pain and fever. Read and follow all instructions on the label. Do not give aspirin to anyone younger than 20. It has been linked to Reye syndrome, a serious illness. · Drink plenty of fluids. Hot fluids, such as tea or soup, may help relieve congestion in your nose and throat. If you have kidney, heart, or liver disease and have to limit fluids, talk with your doctor before you increase the amount of fluids you drink. · Try to clear mucus from your lungs by breathing deeply and coughing. · Gargle with warm salt water once an hour. This can help reduce swelling and throat pain. Use 1 teaspoon of salt mixed in 1 cup of warm water. · Do not smoke or allow others to smoke around you. If you need help quitting, talk to your doctor about stop-smoking programs and medicines. These can increase your chances of quitting for good. To avoid spreading the virus  · Cough or sneeze into a tissue. Then throw the tissue away. · If you don't have a tissue, use your hand to cover your cough or sneeze. Then clean your hand. You can also cough into your sleeve. · Wash your hands often.  Use soap and warm water. Wash for 15 to 20 seconds each time. · If you don't have soap and water near you, you can clean your hands with alcohol wipes or gel. When should you call for help? Call your doctor now or seek immediate medical care if:    · You have a new or higher fever.     · Your fever lasts more than 48 hours.     · You have trouble breathing.     · You have a fever with a stiff neck or a severe headache.     · You are sensitive to light.     · You feel very sleepy or confused. Watch closely for changes in your health, and be sure to contact your doctor if:    · You do not get better as expected. Where can you learn more? Go to https://Route4MepeTelos Entertainmenteb.[employer]. org and sign in to your Unifysquare account. Enter R949 in the [facility] box to learn more about \"Viral Respiratory Infection: Care Instructions. \"     If you do not have an account, please click on the \"Sign Up Now\" link. Current as of: July 6, 2021               Content Version: 13.0  © 2006-2021 Healthwise, Incorporated. Care instructions adapted under license by [facility] ([facility]). If you have questions about a medical condition or this instruction, always ask your healthcare professional. [address] any warranty or liability for your use of this information.

## 2021-12-10 NOTE — PROGRESS NOTES
12/10/2021    HPI:  Chief complaint and history of present illness as per medical assistant/nurse documented today in the Flu/COVID-19 clinic. Patient presents to the clinic today with complaints of 2-day history of cough, nasal congestion, sore throat myalgias. She had a fever 2 days ago 100.4 °F.  She has taken a few doses of Tylenol and Advil since being sick. She denies chest pain, chest tightness or heaviness or shortness of breath or wheezing. She denies nausea, vomiting, diarrhea, loss of taste or smell. She reports that her kids were sick last week and tested negative for COVID-19. They also tested negative for strep throat. She would like to be tested for COVID-19, influenza, and strep throat in office today. She is not vaccinated against COVID-19 but reports she did test positive for the virus 12 months ago. MEDICATIONS:  Prior to Visit Medications    Medication Sig Taking? Authorizing Provider   amphetamine-dextroamphetamine (ADDERALL, 5MG,) 5 MG tablet Take 1 tablet by mouth 2 times daily for 30 days.  (6-8 hours apart)  Nikko Giordano MD   valACYclovir (VALTREX) 1 g tablet Take 1 g by mouth daily  Historical Provider, MD   pantoprazole (PROTONIX) 40 MG tablet Take 1 tablet by mouth 2 times daily  Charolet Hamman, APRN - CNP   famotidine (PEPCID) 40 MG tablet Take 1 tablet by mouth every evening  Charolet Hamman, APRN - CNP   ondansetron (ZOFRAN ODT) 4 MG disintegrating tablet Take 1 tablet by mouth every 8 hours as needed for Nausea or Vomiting  Charolet Hamman, APRN - CNP   tiZANidine (ZANAFLEX) 4 MG tablet TAKE 1 TABLET BY MOUTH EVERY DAY AT NIGHT  Nikko Giordano MD   fluticasone (FLONASE) 50 MCG/ACT nasal spray 2 sprays by Each Nostril route daily  Nikko Giordano MD   Multiple Vitamins-Minerals (MULTI COMPLETE PO) Take by mouth  Historical Provider, MD       Allergies   Allergen Reactions    Buspar [Buspirone]     Zoloft [Sertraline Hcl]    ,   Past Medical History:   Diagnosis Date    History of pneumonia     Pneumonia of right upper lobe due to infectious organism 6/28/2016       PHYSICAL EXAM:  Physical Exam    Vitals:    12/10/21 1433   Pulse: 90   Resp: 12   Temp: 97.3 °F (36.3 °C)   SpO2: 98%         Constitutional:  Well developed, well nourished  HENT:  Normocephalic, atraumatic, bilateral external ears normal, bilateral ear canals normal, bilateral TMs normal, oropharynx moist, postnasal drip noted. No petechiae or exudate. Uvula midline and benign and airway patent. Nasal turbinates erythematous and edematous. Nasal mucosa congested. Sinuses nontender  Eyes:  conjunctiva normal, no discharge, no scleral icterus  Cardiovascular:  Normal heart rate, normal rhythm, no murmurs, gallops or rubs  Thorax & Lungs:  Normal breath sounds, no respiratory distress, no wheezing, no rales, no rhonchi  Skin:  Warm, dry, no erythema, no rash  Neurologic:  Alert & oriented   Psychiatric:  Affect normal, mood normal    ASSESSMENT/PLAN:  1. Viral URI with cough  - Covid-19 Ambulatory  - POCT Influenza A/B    2. Sore throat  - POCT rapid strep A  - Culture, Throat        Rapid strep test negative, throat culture pending  Rapid influenza test negative  PCR Covid test pending. Isolation measures discussed  Increase fluids and rest  Saline nasal spray as needed for nasal congestion  Warm salt gargles as needed for throat discomfort  Monitor temperature twice a day  Tylenol as needed for fevers and/or discomfort. Big deep breaths periodically throughout the day  Okay to take over-the-counter cough and cold medications as needed  If symptoms worsen -Go to the ER. Follow up with your primary care provider      I did don appropriate PPE (including N95 face mask, protective safety glasses, face shield, gloves, and gown) as recommended by the health facility/national standard best practice, during my interaction with the patient. FOLLOW-UP:  No follow-ups on file.         Jasmin Sigala PA-C

## 2021-12-12 LAB
SARS-COV-2: NOT DETECTED
SOURCE: NORMAL

## 2021-12-13 LAB — THROAT CULTURE: NORMAL

## 2021-12-23 ENCOUNTER — PATIENT MESSAGE (OUTPATIENT)
Dept: FAMILY MEDICINE CLINIC | Age: 41
End: 2021-12-23

## 2021-12-27 NOTE — TELEPHONE ENCOUNTER
From: Cheyenne Omalley  To: Dr. Farrell Meena: 12/23/2021 6:34 PM EST  Subject: Tamiflu    Hi Dr. Tyshawn Doss I had left a message yesterday asking for you to call tamiflu in for myself and my  Mateo Philippe. Our daughter is positive for flu type A. Our other kids are being treated as well and I am sure we need treated now too. I'm trying to avoid spreading the germ and be able to stay home to take care of the kids. Thank you in advance.  We use [facility]

## 2021-12-28 RX ORDER — OSELTAMIVIR PHOSPHATE 75 MG/1
75 CAPSULE ORAL 2 TIMES DAILY
Qty: 10 CAPSULE | Refills: 0 | Status: SHIPPED | OUTPATIENT
Start: 2021-12-28 | End: 2022-01-02

## 2022-01-03 ENCOUNTER — TELEPHONE (OUTPATIENT)
Dept: FAMILY MEDICINE CLINIC | Age: 42
End: 2022-01-03

## 2022-01-03 NOTE — TELEPHONE ENCOUNTER
Message left on secure voicemail informing client need to contact office to reschedule appointment with Dr Garima Frias that was for 1/4/22. My chart message sent.

## 2022-02-08 DIAGNOSIS — F90.2 ATTENTION DEFICIT HYPERACTIVITY DISORDER (ADHD), COMBINED TYPE: ICD-10-CM

## 2022-02-08 DIAGNOSIS — M79.7 FIBROMYALGIA: ICD-10-CM

## 2022-02-08 RX ORDER — TIZANIDINE 4 MG/1
TABLET ORAL
Qty: 30 TABLET | Refills: 5 | Status: SHIPPED | OUTPATIENT
Start: 2022-02-08 | End: 2022-09-19 | Stop reason: SDUPTHER

## 2022-02-08 RX ORDER — DEXTROAMPHETAMINE SACCHARATE, AMPHETAMINE ASPARTATE, DEXTROAMPHETAMINE SULFATE AND AMPHETAMINE SULFATE 1.25; 1.25; 1.25; 1.25 MG/1; MG/1; MG/1; MG/1
5 TABLET ORAL 2 TIMES DAILY
Qty: 60 TABLET | Refills: 0 | Status: SHIPPED | OUTPATIENT
Start: 2022-02-08 | End: 2022-03-22 | Stop reason: SDUPTHER

## 2022-02-11 ENCOUNTER — OFFICE VISIT (OUTPATIENT)
Dept: FAMILY MEDICINE CLINIC | Age: 42
End: 2022-02-11
Payer: COMMERCIAL

## 2022-02-11 ENCOUNTER — HOSPITAL ENCOUNTER (OUTPATIENT)
Age: 42
Setting detail: SPECIMEN
Discharge: HOME | End: 2022-02-11
Payer: COMMERCIAL

## 2022-02-11 VITALS
WEIGHT: 251 LBS | RESPIRATION RATE: 12 BRPM | TEMPERATURE: 97.9 F | OXYGEN SATURATION: 99 % | BODY MASS INDEX: 43.08 KG/M2 | HEART RATE: 88 BPM

## 2022-02-11 DIAGNOSIS — Z20.822 CLOSE EXPOSURE TO COVID-19 VIRUS: ICD-10-CM

## 2022-02-11 DIAGNOSIS — J06.9 VIRAL URI WITH COUGH: Primary | ICD-10-CM

## 2022-02-11 DIAGNOSIS — J02.9 SORE THROAT: ICD-10-CM

## 2022-02-11 LAB — S PYO AG THROAT QL: NORMAL

## 2022-02-11 PROCEDURE — G8427 DOCREV CUR MEDS BY ELIG CLIN: HCPCS | Performed by: PHYSICIAN ASSISTANT

## 2022-02-11 PROCEDURE — G8484 FLU IMMUNIZE NO ADMIN: HCPCS | Performed by: PHYSICIAN ASSISTANT

## 2022-02-11 PROCEDURE — 1036F TOBACCO NON-USER: CPT | Performed by: PHYSICIAN ASSISTANT

## 2022-02-11 PROCEDURE — 99213 OFFICE O/P EST LOW 20 MIN: CPT | Performed by: PHYSICIAN ASSISTANT

## 2022-02-11 PROCEDURE — 87880 STREP A ASSAY W/OPTIC: CPT | Performed by: PHYSICIAN ASSISTANT

## 2022-02-11 PROCEDURE — G8417 CALC BMI ABV UP PARAM F/U: HCPCS | Performed by: PHYSICIAN ASSISTANT

## 2022-02-11 PROCEDURE — U0005 INFEC AGEN DETEC AMPLI PROBE: HCPCS

## 2022-02-11 PROCEDURE — U0003 INFECTIOUS AGENT DETECTION BY NUCLEIC ACID (DNA OR RNA); SEVERE ACUTE RESPIRATORY SYNDROME CORONAVIRUS 2 (SARS-COV-2) (CORONAVIRUS DISEASE [COVID-19]), AMPLIFIED PROBE TECHNIQUE, MAKING USE OF HIGH THROUGHPUT TECHNOLOGIES AS DESCRIBED BY CMS-2020-01-R: HCPCS

## 2022-02-11 NOTE — PROGRESS NOTES
2/11/2022    HPI:  Chief complaint and history of present illness as per medical assistant/nurse documented today in the Flu/COVID-19 clinic. Patient presents to the clinic today with complaints of 2-day history of cough, nasal congestion, postnasal drip, sore throat and myalgias. She denies chest pain, shortness of breath, wheezing, fever or chills. She denies nausea, vomiting, diarrhea, loss of taste or smell. She states that her entire household recently had COVID-19. She is alone 16 days out from daughters diagnosis. The whole family also had influenza last month, treated with Tamiflu. She has also been around a family member who recently had strep throat. She would like tested for strep throat. Patient is not vaccinated against COVID-19. MEDICATIONS:  Prior to Visit Medications    Medication Sig Taking? Authorizing Provider   tiZANidine (ZANAFLEX) 4 MG tablet TAKE 1 TABLET BY MOUTH EVERY DAY AT NIGHT  Shazia Angulo MD   amphetamine-dextroamphetamine (ADDERALL, 5MG,) 5 MG tablet Take 1 tablet by mouth 2 times daily for 30 days.  (6-8 hours apart)  Shazia Angulo MD   valACYclovir (VALTREX) 1 g tablet Take 1 g by mouth daily  Historical Provider, MD   pantoprazole (PROTONIX) 40 MG tablet Take 1 tablet by mouth 2 times daily  MARIA LUZ Ray CNP   famotidine (PEPCID) 40 MG tablet Take 1 tablet by mouth every evening  MARIA LUZ Ray CNP   ondansetron (ZOFRAN ODT) 4 MG disintegrating tablet Take 1 tablet by mouth every 8 hours as needed for Nausea or Vomiting  VearMARIA LUZ Red CNP   fluticasone (FLONASE) 50 MCG/ACT nasal spray 2 sprays by Each Nostril route daily  Shazia Angulo MD   Multiple Vitamins-Minerals (MULTI COMPLETE PO) Take by mouth  Historical Provider, MD       Allergies   Allergen Reactions    Buspar [Buspirone]     Zoloft [Sertraline Hcl]    ,   Past Medical History:   Diagnosis Date    History of pneumonia     Pneumonia of right upper lobe due to infectious organism 6/28/2016       PHYSICAL EXAM:  Physical Exam    Vitals:    02/11/22 1035   Pulse: 88   Resp: 12   Temp: 97.9 °F (36.6 °C)   SpO2: 99%         Constitutional:  Well developed, well nourished  HENT:  Normocephalic, atraumatic, bilateral external ears normal, bilateral ear canals normal, bilateral TMs normal, oropharynx moist, postnasal drip noted. No petechiae or exudate. No abscess. Uvula midline. Airway benign. Nasal mucosa congested. Eyes:  conjunctiva normal, no discharge, no scleral icterus  Cardiovascular:  Normal heart rate, normal rhythm, no murmurs, gallops or rubs  Thorax & Lungs:  Normal breath sounds, no respiratory distress, no wheezing, no rales, no rhonchi  Skin:  Warm, dry, no erythema, no rash  Neurologic:  Alert & oriented   Psychiatric:  Affect normal, mood normal    ASSESSMENT/PLAN:  1. Viral URI with cough  - Covid-19 Ambulatory    2. Close exposure to COVID-19 virus  - Covid-19 Ambulatory    3. Sore throat  - POCT rapid strep A    Viral etiology and supportive measures discussed  Rapid strep test negative. No culture obtained. Centor criteria considered for medical decision-making. COVID-19 test results pending  Increase fluids and rest  Saline nasal spray as needed for nasal congestion  Flonase nasal spray 2 squirts each nostril once daily  Warm salt gargles as needed for throat discomfort  Cepacol lozenges or Chloraseptic spray over-the-counter as needed  Monitor temperature twice a day  Tylenol as needed for fevers and/or discomfort. Big deep breaths periodically throughout the day  Mucinex DM over the counter as needed for chest congestion/cough  If symptoms worsen -Go to the ER.    Follow up with your primary care provider      I did don appropriate PPE (including N95 face mask, protective safety glasses, face shield, gloves, and gown) as recommended by the health facility/national standard best practice, during my interaction with the patient. FOLLOW-UP:  No follow-ups on file.         Irish Granger PA-C

## 2022-02-11 NOTE — PROGRESS NOTES
2/11/22  Elena Gibbs  1980    FLU/COVID-19 CLINIC EVALUATION    HPI SYMPTOMS:    Employer:Unemployed    [] Fevers  [] Chills  [x] Cough  [] Coughing up blood  [] Chest Congestion  [x] Nasal Congestion  [] Feeling short of breath  [] Sometimes  [] Frequently  [] All the time  [] Chest pain  [] Headaches  []Tolerable  [] Severe  [x] Sore throat  [x] Muscle aches  [] Nausea  [] Vomiting  []Unable to keep fluids down  [] Diarrhea  []Severe    [] OTHER SYMPTOMS:      Symptom Duration:   [] 1  [x] 2   [] 3   [] 4    [] 5   [] 6   [] 7   [] 8   [] 9   [] 10   [] 11   [] 12   [] 13   [] 14   [] Longer than 14 days    Symptom course:   [] Worsening     [x] Stable     [] Improving    RISK FACTORS:    [] Pregnant or possibly pregnant  [] Age over 61  [] Diabetes  [] Heart disease  [] Asthma  [] COPD/Other chronic lung diseases  [] Active Cancer  [] On Chemotherapy  [] Taking oral steroids  [] History Lymphoma/Leukemia  [x] Close contact with a lab confirmed COVID-19 patient within 14 days of symptom onset-daughter  [] History of travel from affected geographical areas within 14 days of symptom onset       VITALS:  There were no vitals filed for this visit. TESTS:    POCT FLU:  [] Positive     []Negative    ASSESSMENT:    [] Flu  [] Possible COVID-19  [] Strep    PLAN:    [] Discharge home with written instructions for:  [] Flu management  [] Possible COVID-19 infection with self-quarantine and management of symptoms  [] Follow-up with primary care physician or emergency department if worsens  [] Evaluation per physician/NP/PA in clinic  [] Sent to ER       An  electronic signature was used to authenticate this note.      --Denia Avitia on 2/11/2022 at 9:59 AM

## 2022-02-11 NOTE — PATIENT INSTRUCTIONS
Your COVID 19 test can take 1-5 days for the results to come back. We ask that you make a Mychart page and view your test results this way. You will need to Self quarantine until you know your results. If positive, please work on contact tracing. Viral etiology and supportive measures discussed  Rapid strep test negative  COVID-19 test results pending  Increase fluids and rest  Saline nasal spray as needed for nasal congestion  Flonase nasal spray 2 squirts each nostril once daily  Warm salt gargles as needed for throat discomfort  Cepacol lozenges or Chloraseptic spray over-the-counter as needed  Monitor temperature twice a day  Tylenol as needed for fevers and/or discomfort. Big deep breaths periodically throughout the day  Mucinex DM over the counter as needed for chest congestion/cough  If symptoms worsen -Go to the ER. Follow up with your primary care provider    To Whom it May Concern:    Codie Dudley was tested for COVID-19 2/11/2022. He/she must stay home until test results are back. If test is positive, isolate for a total of 5 days, starting from day 1 of symptom onset. After 5 days, if fever-free for 24 hours and there has been a gradual improvement in symptoms, may come out of isolation, but must consistently wear a mask when around other people for 5 additional days. (5 days isolation, 5 days mask-wearing). We do not recommend retesting as patients may continue to test positive for months even though no longer contagious. It is suggested you call [facility] or [facility] with any questions regarding isolation timeframe/return to work/school details. Adrian Severin, PA-C          Patient Education        Viral Respiratory Infection: Care Instructions  Your Care Instructions     Viruses are very small organisms. They grow in number after they enter your body. There are many types that cause different illnesses, such as colds and the mumps.   The symptoms of a viral respiratory infection often start quickly. They include a fever, sore throat, and runny nose. You may also just not feel well. Or you may not want to eat much. Most viral respiratory infections are not serious. They usually get better with time and self-care. Antibiotics are not used to treat a viral infection. That's because antibiotics will not help cure a viral illness. In some cases, antiviral medicine can help your body fight a serious viral infection. Follow-up care is a key part of your treatment and safety. Be sure to make and go to all appointments, and call your doctor if you are having problems. It's also a good idea to know your test results and keep a list of the medicines you take. How can you care for yourself at home? · Rest as much as possible until you feel better. · Be safe with medicines. Take your medicine exactly as prescribed. Call your doctor if you think you are having a problem with your medicine. You will get more details on the specific medicine your doctor prescribes. · Take an over-the-counter pain medicine, such as acetaminophen (Tylenol), ibuprofen (Advil, Motrin), or naproxen (Aleve), as needed for pain and fever. Read and follow all instructions on the label. Do not give aspirin to anyone younger than 20. It has been linked to Reye syndrome, a serious illness. · Drink plenty of fluids. Hot fluids, such as tea or soup, may help relieve congestion in your nose and throat. If you have kidney, heart, or liver disease and have to limit fluids, talk with your doctor before you increase the amount of fluids you drink. · Try to clear mucus from your lungs by breathing deeply and coughing. · Gargle with warm salt water once an hour. This can help reduce swelling and throat pain. Use 1 teaspoon of salt mixed in 1 cup of warm water. · Do not smoke or allow others to smoke around you. If you need help quitting, talk to your doctor about stop-smoking programs and medicines.  These can increase your chances of quitting for good. To avoid spreading the virus  · Cough or sneeze into a tissue. Then throw the tissue away. · If you don't have a tissue, use your hand to cover your cough or sneeze. Then clean your hand. You can also cough into your sleeve. · Wash your hands often. Use soap and warm water. Wash for 15 to 20 seconds each time. · If you don't have soap and water near you, you can clean your hands with alcohol wipes or gel. When should you call for help? Call your doctor now or seek immediate medical care if:    · You have a new or higher fever.     · Your fever lasts more than 48 hours.     · You have trouble breathing.     · You have a fever with a stiff neck or a severe headache.     · You are sensitive to light.     · You feel very sleepy or confused. Watch closely for changes in your health, and be sure to contact your doctor if:    · You do not get better as expected. Where can you learn more? Go to https://Eat.[employer]grar33. org and sign in to your Navidog account. Enter U250 in the [employer] box to learn more about \"Viral Respiratory Infection: Care Instructions. \"     If you do not have an account, please click on the \"Sign Up Now\" link. Current as of: July 6, 2021               Content Version: 13.1  © 1646-6425 Healthwise, Incorporated. Care instructions adapted under license by [facility] ([facility]). If you have questions about a medical condition or this instruction, always ask your healthcare professional. [place] [address] any warranty or liability for your use of this information.

## 2022-02-12 LAB
SARS-COV-2: NOT DETECTED
SOURCE: NORMAL

## 2022-03-16 ENCOUNTER — HOSPITAL ENCOUNTER (OUTPATIENT)
Dept: NUCLEAR MEDICINE | Age: 42
Discharge: HOME | End: 2022-03-16
Payer: COMMERCIAL

## 2022-03-16 ENCOUNTER — HOSPITAL ENCOUNTER (OUTPATIENT)
Dept: ULTRASOUND IMAGING | Age: 42
Discharge: HOME | End: 2022-03-16
Payer: COMMERCIAL

## 2022-03-16 DIAGNOSIS — R14.0 BLOATING: ICD-10-CM

## 2022-03-16 DIAGNOSIS — R11.0 NAUSEA: ICD-10-CM

## 2022-03-16 PROCEDURE — 76705 ECHO EXAM OF ABDOMEN: CPT

## 2022-03-21 ENCOUNTER — TELEPHONE (OUTPATIENT)
Dept: GASTROENTEROLOGY | Age: 42
End: 2022-03-21

## 2022-03-21 NOTE — TELEPHONE ENCOUNTER
Pt. Called in to schedule an appt to discuss some stomach problems and get medication refills.  Made appt for pt to see jennifer on 3/24/22 @9am

## 2022-03-22 DIAGNOSIS — F90.2 ATTENTION DEFICIT HYPERACTIVITY DISORDER (ADHD), COMBINED TYPE: ICD-10-CM

## 2022-03-22 RX ORDER — DEXTROAMPHETAMINE SACCHARATE, AMPHETAMINE ASPARTATE, DEXTROAMPHETAMINE SULFATE AND AMPHETAMINE SULFATE 1.25; 1.25; 1.25; 1.25 MG/1; MG/1; MG/1; MG/1
5 TABLET ORAL 2 TIMES DAILY
Qty: 60 TABLET | Refills: 0 | Status: SHIPPED | OUTPATIENT
Start: 2022-03-22 | End: 2022-04-13 | Stop reason: SDUPTHER

## 2022-03-22 NOTE — TELEPHONE ENCOUNTER
Refill sent but it appears to go to prior authorization bin, and she has not been seen for several months and will be asked to schedule follow-up

## 2022-03-24 ENCOUNTER — OFFICE VISIT (OUTPATIENT)
Dept: GASTROENTEROLOGY | Age: 42
End: 2022-03-24
Payer: COMMERCIAL

## 2022-03-24 VITALS
BODY MASS INDEX: 43.06 KG/M2 | OXYGEN SATURATION: 96 % | WEIGHT: 252.2 LBS | HEIGHT: 64 IN | TEMPERATURE: 97.4 F | HEART RATE: 111 BPM | SYSTOLIC BLOOD PRESSURE: 128 MMHG | DIASTOLIC BLOOD PRESSURE: 70 MMHG

## 2022-03-24 DIAGNOSIS — R10.11 RIGHT UPPER QUADRANT ABDOMINAL PAIN: ICD-10-CM

## 2022-03-24 DIAGNOSIS — R11.0 NAUSEA: Primary | ICD-10-CM

## 2022-03-24 DIAGNOSIS — K21.9 GASTROESOPHAGEAL REFLUX DISEASE WITHOUT ESOPHAGITIS: ICD-10-CM

## 2022-03-24 DIAGNOSIS — R14.0 BLOATING: ICD-10-CM

## 2022-03-24 DIAGNOSIS — D64.9 ANEMIA, UNSPECIFIED TYPE: ICD-10-CM

## 2022-03-24 DIAGNOSIS — K80.20 CALCULUS OF GALLBLADDER WITHOUT CHOLECYSTITIS WITHOUT OBSTRUCTION: ICD-10-CM

## 2022-03-24 DIAGNOSIS — K80.50 BILIARY COLIC: ICD-10-CM

## 2022-03-24 PROCEDURE — 1036F TOBACCO NON-USER: CPT | Performed by: NURSE PRACTITIONER

## 2022-03-24 PROCEDURE — 99213 OFFICE O/P EST LOW 20 MIN: CPT | Performed by: NURSE PRACTITIONER

## 2022-03-24 PROCEDURE — G8484 FLU IMMUNIZE NO ADMIN: HCPCS | Performed by: NURSE PRACTITIONER

## 2022-03-24 PROCEDURE — G8417 CALC BMI ABV UP PARAM F/U: HCPCS | Performed by: NURSE PRACTITIONER

## 2022-03-24 PROCEDURE — G8427 DOCREV CUR MEDS BY ELIG CLIN: HCPCS | Performed by: NURSE PRACTITIONER

## 2022-03-24 RX ORDER — PANTOPRAZOLE SODIUM 40 MG/1
40 TABLET, DELAYED RELEASE ORAL 2 TIMES DAILY
Qty: 60 TABLET | Refills: 5 | Status: SHIPPED | OUTPATIENT
Start: 2022-03-24 | End: 2022-10-06

## 2022-03-24 RX ORDER — ACETAMINOPHEN 500 MG
2 TABLET ORAL
COMMUNITY

## 2022-03-24 RX ORDER — FAMOTIDINE 40 MG/1
40 TABLET, FILM COATED ORAL EVERY EVENING
Qty: 30 TABLET | Refills: 5 | Status: SHIPPED | OUTPATIENT
Start: 2022-03-24

## 2022-03-24 RX ORDER — ONDANSETRON 4 MG/1
4 TABLET, ORALLY DISINTEGRATING ORAL EVERY 8 HOURS PRN
Qty: 30 TABLET | Refills: 5 | Status: SHIPPED | OUTPATIENT
Start: 2022-03-24 | End: 2022-09-19 | Stop reason: SDUPTHER

## 2022-03-24 NOTE — PROGRESS NOTES
Praveen Yanez 39 y.o. female was seen by CRISTINA Miranda on 03/24/22     Wt Readings from Last 3 Encounters:   03/24/22 252 lb 3.2 oz (114.4 kg)   02/11/22 251 lb (113.9 kg)   12/10/21 248 lb (112.5 kg)       LINETTE Yanez is a pleasant 39 y.o.  female who presents today for follow-up on abdominal pain, acid reflux, bloating, chronic nausea and abdominal ultrasound results. Her last EGD/colonoscopy were done by Dr. Brit Westbrook on 2-5-2020. Her EGD showed gastritis and 1.5 cm hiatal hernia. Her gastroesophageal junction biopsies showed mild chronic inflammation with no evidence of Schreiber's esophagus. Her stomach biopsies showed mild chronic gastritis with no evidence of H. Pylori infection. Her colonoscopy showed grade 1 hemorrhoids and hyperplastic colon polyp removed from sigmoid colon. Her random colon biopsies showed normal tissue with no evidence of microscopic colitis. Her abdominal ultrasound done on 3- showed cholelithiasis without sonographic evidence of acute cholecystitis and hepatic steatosis. Her appetite is fair and weight is stable. She mentioned having more post prandial right upper quadrant pain three to five times a week. She is trying to eat a low fat diet. She has intermittent bloating. Her heartburn and acid reflux is controlled with Protonix and Pepcid. No nocturnal awakenings with acid reflux. No dysphagia or pain with swallowing. Occasional nausea. Last episode of vomiting undigested food over the weekend. Zofran helps and takes once daily. No excess belching or flatulence. Her bowel pattern is daily with soft brown formed stools. No diarrhea or constipation. No blood in her stools or melena.   Her maternal grandfather had colon cancer in his 52's and mother had colon polyps removed in the past.       ROS  Review of Systems   Constitutional: Negative for activity change, appetite change, chills, diaphoresis, fatigue, fever and unexpected weight change. HENT: Positive for tinnitus. Negative for ear pain and hearing loss.    Eyes: Negative for pain and visual disturbance. Respiratory: Positive for shortness of breath. Negative for cough and wheezing.    Cardiovascular: Negative for chest pain, palpitations and leg swelling. Gastrointestinal: Negative for abdominal pain, blood in stool, constipation, diarrhea, nausea and vomiting. Endocrine: Negative for cold intolerance and heat intolerance. Genitourinary: Negative for dysuria, frequency and urgency. Musculoskeletal: Positive for myalgias and neck pain. Negative for back pain.        Fibromyalgia   Skin: Negative for color change, pallor and rash. Allergic/Immunologic: Negative for environmental allergies and food allergies. Neurological: Positive for headaches. Negative for dizziness and seizures. Hematological: Does not bruise/bleed easily. Psychiatric/Behavioral: Negative for dysphoric mood, sleep disturbance and suicidal ideas. The patient is not nervous/anxious.      Allergies  Allergies   Allergen Reactions    Buspar [Buspirone]     Zoloft [Sertraline Hcl]        Medications  Current Outpatient Medications   Medication Sig Dispense Refill    acetaminophen (TYLENOL) 500 MG tablet Take 2 tablets by mouth      ondansetron (ZOFRAN ODT) 4 MG disintegrating tablet Take 1 tablet by mouth every 8 hours as needed for Nausea or Vomiting 30 tablet 5    pantoprazole (PROTONIX) 40 MG tablet Take 1 tablet by mouth 2 times daily 60 tablet 5    famotidine (PEPCID) 40 MG tablet Take 1 tablet by mouth every evening 30 tablet 5    amphetamine-dextroamphetamine (ADDERALL, 5MG,) 5 MG tablet Take 1 tablet by mouth 2 times daily for 30 days.  (6-8 hours apart) 60 tablet 0    tiZANidine (ZANAFLEX) 4 MG tablet TAKE 1 TABLET BY MOUTH EVERY DAY AT NIGHT 30 tablet 5    valACYclovir (VALTREX) 1 g tablet Take 1 g by mouth daily      Multiple Vitamins-Minerals (MULTI COMPLETE PO) Take by mouth No current facility-administered medications for this visit. Past medical history:   She has a past medical history of History of pneumonia and Pneumonia of right upper lobe due to infectious organism. Past surgical history:  She has a past surgical history that includes Tubal ligation ();  section (06);  section (11); Upper gastrointestinal endoscopy; Colonoscopy (N/A, 2020); and Upper gastrointestinal endoscopy (N/A, 2020). Social History:  She reports that she quit smoking about 23 years ago. Her smoking use included cigarettes. She has never used smokeless tobacco. She reports current alcohol use. She reports that she does not use drugs. Family history:  Her family history includes Colon Cancer in her maternal grandfather; Drug Abuse in her father; Mult Sclerosis in her sister. Objective    Vitals:    22 0900   BP: 128/70   Pulse: 111   Temp: 97.4 °F (36.3 °C)   SpO2: 96%        Physical exam    Physical Exam  Vitals reviewed. Constitutional:       General: She is not in acute distress. Appearance: She is well-developed. She is obese. She is not ill-appearing, toxic-appearing or diaphoretic. HENT:      Head: Normocephalic and atraumatic. Nose: Nose normal.      Mouth/Throat:      Mouth: Mucous membranes are moist.   Eyes:      Conjunctiva/sclera: Conjunctivae normal.      Pupils: Pupils are equal, round, and reactive to light. Neck:      Thyroid: No thyromegaly. Vascular: No JVD. Trachea: No tracheal deviation. Cardiovascular:      Rate and Rhythm: Normal rate and regular rhythm. Pulses: Normal pulses. Heart sounds: Normal heart sounds. No murmur heard. No friction rub. No gallop. Pulmonary:      Effort: Pulmonary effort is normal. No respiratory distress. Breath sounds: Normal breath sounds. No stridor. No wheezing, rhonchi or rales. Chest:      Chest wall: No tenderness.    Abdominal:      General: Bowel sounds are normal. There is no distension. Palpations: Abdomen is soft. There is no mass. Tenderness: There is abdominal tenderness (right upper quadrant). There is no guarding or rebound. Hernia: No hernia is present. Musculoskeletal:         General: Normal range of motion. Cervical back: Normal range of motion and neck supple. Lymphadenopathy:      Cervical: No cervical adenopathy. Skin:     General: Skin is warm and dry. Neurological:      Mental Status: She is alert and oriented to person, place, and time. Psychiatric:         Mood and Affect: Mood normal.         Hospital Outpatient Visit on 02/11/2022   Component Date Value Ref Range Status    Source 02/11/2022 UNKNOWN   Final    SARS-CoV-2 02/11/2022 NOT DETECTED  NOT DETECTED Final    Comment:         The specimen is NEGATIVE for SARS-CoV-2, the Novel Coronavirus associated with COVID-19. A negaitve test does not rule out COVID-19. This test has been authorized by the FDA under an Emergency Use Authorization (EUA) for use   by authorized laboratories. [employer] SARS-CoV2 reagents for [employer] are designed to detect the virus that causes   COVID-19 in patients with signs and symptoms of infection who are suspected of COVID-19. An individual without symptoms of COVID-19 and who is not shedding SARS-CoV-2 virus would   expect to have a negaitve (not detected) result in this assay. Fact sheet for Healthcare Providers: [facility]  Fact sheet for Patients: [employer]          Methodology: RT-PCR     Office Visit on 02/11/2022   Component Date Value Ref Range Status    Strep A Ag 02/11/2022 None Detected  None Detected Final       Assessment and Plan:  1.  Abdominal pain to discomfort most likely due to biliary colic. Her abdominal ultrasound showed gallstones.   Will place referral to Dr. Brooks Shah per patient choice to discuss surgical options if indicated. Recommend low fat diet and continue working on weight loss. 2.  GERD that is stable without odynophagia or dysphagia.  Her last EGD done on 2-5-2020 showed GEJ biopsies revealed mild chronic inflammation with no evidence of Schreiber's esophagus. Her EGD revealed chronic gastritis with no evidence of peptic ulcer disease.  The patient was encouraged to continue taking Protonix twice daily for the treatment of acid reflux. The patient was encouraged to continue with anti-reflux measures. Recommend weight loss, avoid eating late at night and avoid foods that trigger acid reflux. 2.  Chronic nausea that is non-worsening most likely from acid reflux induced gastritis, diet or medication related. The abdominal ultrasound showed gallstones so we held the HIDA scan. The patient was encouraged to continue taking Zofran as needed.   3.  Abdominal bloating that is non-worsening most likely diet related. Recommend diet modification, low fat/low carbohydrate diet and weight loss.   4.  Anemia will reorder lab work to evaluate for iron deficiency; she denies worsening fatigue or shortness of breath. She was evaluated for Chito Belle in past and told negative. 5.  The patient was encouraged to follow-up in 3 months.     Total time:  25 minutes.

## 2022-03-25 DIAGNOSIS — D64.9 ANEMIA, UNSPECIFIED TYPE: ICD-10-CM

## 2022-03-25 LAB
A/G RATIO: 1.7 (ref 1.1–2.2)
ALBUMIN SERPL-MCNC: 4.4 G/DL (ref 3.4–5)
ALP BLD-CCNC: 80 U/L (ref 40–129)
ALT SERPL-CCNC: 24 U/L (ref 10–40)
ANION GAP SERPL CALCULATED.3IONS-SCNC: 15 MMOL/L (ref 3–16)
AST SERPL-CCNC: 21 U/L (ref 15–37)
BASOPHILS ABSOLUTE: 0 K/UL (ref 0–0.2)
BASOPHILS RELATIVE PERCENT: 0.6 %
BILIRUB SERPL-MCNC: <0.2 MG/DL (ref 0–1)
BUN BLDV-MCNC: 16 MG/DL (ref 7–20)
CALCIUM SERPL-MCNC: 9.3 MG/DL (ref 8.3–10.6)
CHLORIDE BLD-SCNC: 106 MMOL/L (ref 99–110)
CO2: 19 MMOL/L (ref 21–32)
CREAT SERPL-MCNC: 0.7 MG/DL (ref 0.6–1.1)
EOSINOPHILS ABSOLUTE: 0.1 K/UL (ref 0–0.6)
EOSINOPHILS RELATIVE PERCENT: 1.2 %
FERRITIN: 4.8 NG/ML (ref 15–150)
FOLATE: >20 NG/ML (ref 4.78–24.2)
GFR AFRICAN AMERICAN: >60
GFR NON-AFRICAN AMERICAN: >60
GLUCOSE BLD-MCNC: 102 MG/DL (ref 70–99)
HCT VFR BLD CALC: 27.8 % (ref 36–48)
HEMOGLOBIN: 8.9 G/DL (ref 12–16)
IRON SATURATION: 6 % (ref 15–50)
IRON: 24 UG/DL (ref 37–145)
LYMPHOCYTES ABSOLUTE: 1.6 K/UL (ref 1–5.1)
LYMPHOCYTES RELATIVE PERCENT: 22.8 %
MCH RBC QN AUTO: 23.3 PG (ref 26–34)
MCHC RBC AUTO-ENTMCNC: 31.9 G/DL (ref 31–36)
MCV RBC AUTO: 73.1 FL (ref 80–100)
MONOCYTES ABSOLUTE: 0.7 K/UL (ref 0–1.3)
MONOCYTES RELATIVE PERCENT: 9.7 %
NEUTROPHILS ABSOLUTE: 4.5 K/UL (ref 1.7–7.7)
NEUTROPHILS RELATIVE PERCENT: 65.7 %
PDW BLD-RTO: 16.4 % (ref 12.4–15.4)
PLATELET # BLD: 363 K/UL (ref 135–450)
PMV BLD AUTO: 8.2 FL (ref 5–10.5)
POTASSIUM SERPL-SCNC: 4.2 MMOL/L (ref 3.5–5.1)
RBC # BLD: 3.81 M/UL (ref 4–5.2)
SODIUM BLD-SCNC: 140 MMOL/L (ref 136–145)
TOTAL IRON BINDING CAPACITY: 421 UG/DL (ref 260–445)
TOTAL PROTEIN: 7 G/DL (ref 6.4–8.2)
VITAMIN B-12: 848 PG/ML (ref 211–911)
VITAMIN D 25-HYDROXY: 45.2 NG/ML
WBC # BLD: 6.9 K/UL (ref 4–11)

## 2022-03-30 ENCOUNTER — TELEPHONE (OUTPATIENT)
Dept: GASTROENTEROLOGY | Age: 42
End: 2022-03-30

## 2022-03-30 RX ORDER — FERROUS SULFATE 325(65) MG
325 TABLET ORAL 2 TIMES DAILY
Qty: 180 TABLET | Refills: 2 | Status: SHIPPED | OUTPATIENT
Start: 2022-03-30

## 2022-03-30 RX ORDER — DOCUSATE SODIUM 100 MG/1
100 CAPSULE, LIQUID FILLED ORAL 2 TIMES DAILY
Qty: 60 CAPSULE | Refills: 3 | Status: SHIPPED | OUTPATIENT
Start: 2022-03-30 | End: 2022-04-29

## 2022-03-30 NOTE — TELEPHONE ENCOUNTER
The patient was called with lab results; her Iron levels and Ferritin were low; will start on Ferrous sulfate 325 mg twice daily and Colace 100 mg twice daily and will recheck. She denies symptoms of anemia. No blood in her stools or melena.   She has followed with Hematology in past.

## 2022-04-09 PROBLEM — K80.10 CCC (CHRONIC CALCULOUS CHOLECYSTITIS): Status: ACTIVE | Noted: 2022-04-09

## 2022-04-13 ENCOUNTER — OFFICE VISIT (OUTPATIENT)
Dept: FAMILY MEDICINE CLINIC | Age: 42
End: 2022-04-13
Payer: COMMERCIAL

## 2022-04-13 VITALS
SYSTOLIC BLOOD PRESSURE: 122 MMHG | OXYGEN SATURATION: 97 % | BODY MASS INDEX: 42.05 KG/M2 | HEART RATE: 96 BPM | WEIGHT: 245 LBS | TEMPERATURE: 98 F | DIASTOLIC BLOOD PRESSURE: 70 MMHG

## 2022-04-13 DIAGNOSIS — M79.10 MYALGIA: ICD-10-CM

## 2022-04-13 DIAGNOSIS — M79.7 FIBROMYALGIA: ICD-10-CM

## 2022-04-13 DIAGNOSIS — D50.9 MICROCYTIC ANEMIA: ICD-10-CM

## 2022-04-13 DIAGNOSIS — R11.0 NAUSEA: ICD-10-CM

## 2022-04-13 DIAGNOSIS — F90.2 ATTENTION DEFICIT HYPERACTIVITY DISORDER (ADHD), COMBINED TYPE: Primary | ICD-10-CM

## 2022-04-13 PROCEDURE — G8427 DOCREV CUR MEDS BY ELIG CLIN: HCPCS | Performed by: FAMILY MEDICINE

## 2022-04-13 PROCEDURE — 1036F TOBACCO NON-USER: CPT | Performed by: FAMILY MEDICINE

## 2022-04-13 PROCEDURE — 99214 OFFICE O/P EST MOD 30 MIN: CPT | Performed by: FAMILY MEDICINE

## 2022-04-13 PROCEDURE — G8417 CALC BMI ABV UP PARAM F/U: HCPCS | Performed by: FAMILY MEDICINE

## 2022-04-13 RX ORDER — DEXTROAMPHETAMINE SACCHARATE, AMPHETAMINE ASPARTATE, DEXTROAMPHETAMINE SULFATE AND AMPHETAMINE SULFATE 1.25; 1.25; 1.25; 1.25 MG/1; MG/1; MG/1; MG/1
5 TABLET ORAL 2 TIMES DAILY
Qty: 60 TABLET | Refills: 0 | Status: SHIPPED | OUTPATIENT
Start: 2022-04-20 | End: 2022-05-23 | Stop reason: SDUPTHER

## 2022-04-13 RX ORDER — PROMETHAZINE HYDROCHLORIDE 25 MG/1
25 SUPPOSITORY RECTAL EVERY 6 HOURS PRN
Qty: 12 SUPPOSITORY | Refills: 1 | Status: SHIPPED | OUTPATIENT
Start: 2022-04-13 | End: 2022-04-20

## 2022-04-13 NOTE — PROGRESS NOTES
Vitamins-Minerals (MULTI COMPLETE PO) Take by mouth         Tobacco use history updated. Social History     Tobacco Use   Smoking Status Former Smoker    Types: Cigarettes    Quit date: 1998    Years since quittin.7   Smokeless Tobacco Never Used        Nursing note reviewed. Vitals:    22 1051   BP: 122/70   Site: Left Upper Arm   Position: Sitting   Cuff Size: Medium Adult   Pulse: 96   Temp: 98 °F (36.7 °C)   TempSrc: Oral   SpO2: 97%   Weight: 245 lb (111.1 kg)          BP Readings from Last 3 Encounters:   22 122/70   22 128/70   10/29/21 112/82     Wt Readings from Last 3 Encounters:   22 245 lb (111.1 kg)   22 249 lb (112.9 kg)   22 252 lb 3.2 oz (114.4 kg)     Body mass index is 42.05 kg/m². No results found for this visit on 22. Physical Exam      _________________________________________________  Assessment:     1. Attention deficit hyperactivity disorder (ADHD), combined type  -     amphetamine-dextroamphetamine (ADDERALL, 5MG,) 5 MG tablet; Take 1 tablet by mouth 2 times daily for 30 days. (6-8 hours apart), Disp-60 tablet, R-0Normal  2. Fibromyalgia  3. Nausea  -     promethazine (PROMETHEGAN) 25 MG suppository; Place 1 suppository rectally every 6 hours as needed for Nausea, Disp-12 suppository, R-1Normal  4. Myalgia  5. Microcytic anemia  -     Verner Aden, MD, Hematology Oncology, [facility]    Problems listed above are stable and therapeutic plan is unchanged unless otherwise specified. See orders above and comments below for details of workup or medication orders. _________________________________________________  Plan:     Return if symptoms worsen or fail to improve, for NV 1 month for treatment agreement and UDS for adhd medication.       Electronically signed by Yoselin Uribe MD on 22 at 10:56 AM EDT

## 2022-04-13 NOTE — Clinical Note
Please print and send last labs from end of April to surgeon Dr. Helio Horner worsened anemia needing workup, GB surgery scheduled 4/28/22 at [facility].

## 2022-04-14 ENCOUNTER — TELEPHONE (OUTPATIENT)
Dept: FAMILY MEDICINE CLINIC | Age: 42
End: 2022-04-14

## 2022-04-14 NOTE — TELEPHONE ENCOUNTER
----- Message from Suresh Garcia MD sent at 4/13/2022 11:08 AM EDT -----  Please print and send last labs from end of April to surgeon Dr. Leslie Garcia worsened anemia needing workup, GB surgery scheduled 4/28/22 at [facility].

## 2022-04-22 LAB
BASOPHILS ABSOLUTE: 0.1 /ΜL
BASOPHILS RELATIVE PERCENT: 1 %
BUN BLDV-MCNC: 14 MG/DL
CALCIUM SERPL-MCNC: 9.4 MG/DL
CHLORIDE BLD-SCNC: 101 MMOL/L
CO2: 19 MMOL/L
CREAT SERPL-MCNC: 0.79 MG/DL
EOSINOPHILS ABSOLUTE: 0.1 /ΜL
EOSINOPHILS RELATIVE PERCENT: 1 %
GFR CALCULATED: 96
GLUCOSE BLD-MCNC: 88 MG/DL
HCT VFR BLD CALC: 35.8 % (ref 36–46)
HEMOGLOBIN: 10.7 G/DL (ref 12–16)
LYMPHOCYTES ABSOLUTE: 1.8 /ΜL
LYMPHOCYTES RELATIVE PERCENT: 22 %
MCH RBC QN AUTO: 24.5 PG
MCHC RBC AUTO-ENTMCNC: 29.9 G/DL
MCV RBC AUTO: 82 FL
MONOCYTES ABSOLUTE: 0.7 /ΜL
MONOCYTES RELATIVE PERCENT: 8 %
NEUTROPHILS ABSOLUTE: 5.5 /ΜL
NEUTROPHILS RELATIVE PERCENT: 67 %
PLATELET # BLD: 364 K/ΜL
PMV BLD AUTO: ABNORMAL FL
POTASSIUM SERPL-SCNC: 4.6 MMOL/L
RBC # BLD: 4.37 10^6/ΜL
SODIUM BLD-SCNC: 135 MMOL/L
WBC # BLD: 8.1 10^3/ML

## 2022-05-09 ENCOUNTER — OFFICE VISIT (OUTPATIENT)
Dept: ONCOLOGY | Age: 42
End: 2022-05-09
Payer: COMMERCIAL

## 2022-05-09 ENCOUNTER — HOSPITAL ENCOUNTER (OUTPATIENT)
Dept: INFUSION THERAPY | Age: 42
Discharge: HOME | End: 2022-05-09
Payer: COMMERCIAL

## 2022-05-09 VITALS
HEART RATE: 82 BPM | DIASTOLIC BLOOD PRESSURE: 61 MMHG | HEIGHT: 64 IN | BODY MASS INDEX: 42 KG/M2 | OXYGEN SATURATION: 98 % | TEMPERATURE: 97.3 F | WEIGHT: 246 LBS | SYSTOLIC BLOOD PRESSURE: 122 MMHG

## 2022-05-09 DIAGNOSIS — D50.0 IRON DEFICIENCY ANEMIA SECONDARY TO BLOOD LOSS (CHRONIC): Primary | ICD-10-CM

## 2022-05-09 DIAGNOSIS — D50.0 IRON DEFICIENCY ANEMIA SECONDARY TO BLOOD LOSS (CHRONIC): ICD-10-CM

## 2022-05-09 PROCEDURE — 85246 CLOT FACTOR VIII VW ANTIGEN: CPT

## 2022-05-09 PROCEDURE — 85240 CLOT FACTOR VIII AHG 1 STAGE: CPT

## 2022-05-09 PROCEDURE — G8427 DOCREV CUR MEDS BY ELIG CLIN: HCPCS | Performed by: INTERNAL MEDICINE

## 2022-05-09 PROCEDURE — 36415 COLL VENOUS BLD VENIPUNCTURE: CPT

## 2022-05-09 PROCEDURE — 1036F TOBACCO NON-USER: CPT | Performed by: INTERNAL MEDICINE

## 2022-05-09 PROCEDURE — G8417 CALC BMI ABV UP PARAM F/U: HCPCS | Performed by: INTERNAL MEDICINE

## 2022-05-09 PROCEDURE — 85247 CLOT FACTOR VIII MULTIMETRIC: CPT

## 2022-05-09 PROCEDURE — 99214 OFFICE O/P EST MOD 30 MIN: CPT | Performed by: INTERNAL MEDICINE

## 2022-05-09 PROCEDURE — 85245 CLOT FACTOR VIII VW RISTOCTN: CPT

## 2022-05-09 ASSESSMENT — PATIENT HEALTH QUESTIONNAIRE - PHQ9
SUM OF ALL RESPONSES TO PHQ QUESTIONS 1-9: 0
1. LITTLE INTEREST OR PLEASURE IN DOING THINGS: 0
SUM OF ALL RESPONSES TO PHQ9 QUESTIONS 1 & 2: 0
2. FEELING DOWN, DEPRESSED OR HOPELESS: 0
SUM OF ALL RESPONSES TO PHQ QUESTIONS 1-9: 0

## 2022-05-09 NOTE — PROGRESS NOTES
Patient Name: Ann Marie Ventura  Patient : 1980  Patient MRN: [id number]     Primary Oncologist: Jay Tiwari MD  Referring Provider: Say Nielsen MD     Date of Service: 2022      Chief Complaint:   Chief Complaint   Patient presents with    Follow-up     Patient Active Problem List:     Family history of multiple sclerosis     Chronic nonintractable headache     Dysmenorrhea     NSAID induced gastritis     Dysphagia     Epigastric pain     Gastroesophageal reflux disease without esophagitis     Blood in stool     Iron deficiency anemia secondary to blood loss (chronic)     History of 2019 novel coronavirus disease (COVID-19)     COVID-19 vaccination refused     Attention deficit hyperactivity disorder (ADHD), combined type     CCC (chronic calculous cholecystitis)    HPI:   Ms. Jose Elias Kirk is a 70-year-old very pleasant female with medical history significant for obesity, initially referred to me on Diane 3, 2020 for evaluation of her iron deficiency anemia. She stated that she initially presented with abdomnal pain, bloating and anemia. She was evaluated by Dr. Kaylene Luna and she had EGD/colonoscopy on 2020. Her EGD revealed normal esophagus, EG junction at 40 cm, small hiatal hernia (1.5 cm) and gastritis noted in stomach antrum. Her stomach biopsies revealed mild chronic gastritis with no evidence of H. Pylori infection. Her GEJ biopsies revealed mild chronic inflammation with no evidence of Schreiber's esophagus. Her colonoscopy revealed grade 1 hemorrhoids and hyperplastic polyp removed from her sigmoid colon. Her random right and left colon biopsies revealed no evidence of microscopic colitis. Anemia work ups on May 11, 2020 was consistent with iron deficiency anemia (Ferritin 8.5 ng/ml, TIBC 429 and iron saturation 6%). She has menorrhagia since she was young. She also has family history of type 1 von Willebrand disease.      She was subsequently referred to me for evaluation of her anemia and possible type 1 vWD. I started ferrous sulfate 325 mg daily on 6/3/22. Char Austin panel done on 22 was normal.     She missed follow up with me since 6/3/2020, until 2022. She was found to have severe iron deficiency anemia on 3/25/22. She stopped taking ferrous sulfate since 2020. Dr. Adeline Rudolph resumed back on ferrous sulfate since 3/25/22. On May 9, 2022, she presented for further evaluation of her iron deficiency anemia. She is planned to have cholecystectomy by Dr. Claude mccormick. She still has menorrhagia. Her GI work ups on 2020 didn't show any bleeding source. I still believe her iron deficiency anemia is due to chronic blood loss from menorrhagia and I recommend her to continue with ferrous sulfate 650 mg daily. Her anemia is starting to getting better on 2022 blood test.  Will re evaluate her iron status in 4 months. If she continues to have iron deficiency at that time, I will consider for capsule endoscopy. Since she is going to have cholecystectomy, I will recheck her von Willebrand panel to make sure that she does not have von Willebrand disease before surgery. I will follow-up with von Willebrand panel done today. She has some tiredness and fatigue, however it is getting better with iron supplementation. Past Medical History  Significant for   1. Obesity    Surgical History  Significant for  1.  section x 2 ( and )    Allergies  NKDA    Social History  She denies smoking or illicit drug abuse. She socially drinks alcohol. She is currently living in [place] [id number]. Family History  She has family history of type 1 VWD, (4 out of 6 of her children are tested positive). Significant for lung cancer in her maternal grandmother, one maternal aunt and 2 maternal uncles. Her maternal grandfather has stomach cancer.   Her paternal grandfather has lung cancer, two of her paternal aunts has breast cancer and one of her paternal aunt has bladder cancer. Oncology History    No history exists. Review of Systems: \"Per interval history; otherwise 10 point ROS is negative. \"  Her energy level is low, but better and her sleep is good. She doesn't have fever, chills, night sweats, cough, shortness of breath, chest pain, hemoptysis or palpitations. Her bowel and bladder functions are normal. She denies nausea, vomiting, abdominal pain, diarrhea, constipation, dysuria, loss of appetite or weight loss. She doesn't have neuropathy and she denies bleeding or clotting issues. She doesn't have any pain on today's visit. Denies anxiety or depression. The rest of the systems are unremarkable. Vital Signs:  /61 (Site: Right Upper Arm, Position: Sitting, Cuff Size: Large Adult)   Pulse 82   Temp 97.3 °F (36.3 °C) (Infrared)   Ht 5' 4\" (1.626 m)   Wt 246 lb (111.6 kg)   SpO2 98%   BMI 42.23 kg/m²     Physical Exam:  CONSTITUTIONAL: awake, alert, cooperative, no apparent distress   EYES: pupils equal, round and reactive to light, sclera clear, normal conjunctiva  ENT: Normocephalic, without obvious abnormality, atraumatic  NECK: supple, symmetrical, no jugular venous distension, no carotid bruits   HEMATOLOGIC/LYMPHATIC: no cervical, supraclavicular or axillary lymphadenopathy   LUNGS: VBS, no wheezes, no increased work of breathing, no rhonchi, clear to auscultation, no crackles,   CARDIOVASCULAR: regular rate and rhythm, normal S1 and S2, no murmur noted  ABDOMEN: normal bowel sounds x 4, soft, non-distended, non-tender, no masses palpated, no hepatosplenomegaly   MUSCULOSKELETAL: full range of motion noted, tone is normal  NEUROLOGIC: awake, alert, oriented to name, place and time. Motor skills grossly intact. SKIN: appears intact, normal skin color, normal texture, normal turgor, no jaundice.    EXTREMITIES: no LE edema, no leg swelling, no cyanosis, no clubbing,      Labs:  Hematology:  Lab Results   Component Value Date    WBC 8.1 04/22/2022 RBC 4.37 04/22/2022    HGB 10.7 (A) 04/22/2022    HCT 35.8 (A) 04/22/2022    MCV 82 04/22/2022    MCH 24.5 04/22/2022    MCHC 29.9 04/22/2022    RDW 16.4 (H) 03/25/2022     04/22/2022    MPV 8.2 03/25/2022    SEGSPCT 70.6 (H) 07/22/2020    EOSRELPCT 1.2 03/25/2022    BASOPCT 1 04/22/2022    LYMPHOPCT 22 04/22/2022    MONOPCT 8 04/22/2022    SEGSABS 6.1 07/22/2020    EOSABS 0.1 04/22/2022    BASOSABS 0.1 04/22/2022    LYMPHSABS 1.8 04/22/2022    MONOSABS 0.7 04/22/2022    DIFFTYPE AUTOMATED DIFFERENTIAL 07/22/2020    ANISOCYTOSIS 1+ 10/19/2016    POLYCHROM 1+ 10/19/2016     No results found for: ESR  Chemistry:  Lab Results   Component Value Date     04/22/2022    K 4.6 04/22/2022     04/22/2022    CO2 19 04/22/2022    BUN 14 04/22/2022    CREATININE 0.79 04/22/2022    GLUCOSE 88 04/22/2022    CALCIUM 9.4 04/22/2022    PROT 7.0 03/25/2022    LABALBU 4.4 03/25/2022    BILITOT <0.2 03/25/2022    ALKPHOS 80 03/25/2022    AST 21 03/25/2022    ALT 24 03/25/2022    LABGLOM 96 04/22/2022    GFRAA >60 03/25/2022    AGRATIO 1.7 03/25/2022    GLOB 2.5 06/25/2021     Lab Results   Component Value Date     07/22/2020     No components found for: LD  Lab Results   Component Value Date    TSHHS 2.679 04/08/2013    FT3 3.6 04/08/2013     Immunology:  Lab Results   Component Value Date    PROT 7.0 03/25/2022     No results found for: Rosita Perez, TALCR  No results found for: B2M  Coagulation Panel:  Lab Results   Component Value Date    PROTIME 12.2 08/12/2016    INR 1.1 08/12/2016    APTT 25.9 05/24/2014    DDIMER 557 (H) 06/17/2016     Anemia Panel:  Lab Results   Component Value Date    PVUPYZXV56 848 03/25/2022    FOLATE >20.00 03/25/2022     Tumor Markers:  No results found for: , CEA, , LABCA2, PSA  Observations:  PHQ-9 Total Score: 0 (5/9/2022 10:29 AM)     Assessment:  Iron deficiency anemia    Plan:   Ms. Rivera Hensley is a 70-year-old very pleasant female who was found to have anemia, when she presented with GERD symptoms. GI work ups didn't reveal any source of bleeding. She has menorrhagia and family history of von Willebrand disease. Anemia work ups on 5/11/2020 was consistent with iron deficiency anemia. I believe her iron deficiency anemia is most likely due to chronic blood loss from menorrhagia. I started ferrous sulfate 325 mg daily on 6/3/22. Pam Sorto panel done on 7/22/22 was normal.     She missed follow up with me since 6/3/2020, until 5/9/2022. She was found to have severe iron deficiency anemia on 3/25/22. She stopped taking ferrous sulfate since 12/2020. Dr. Merline Necessary resumed back on ferrous sulfate since 3/25/22. On May 9, 2022, she presented for further evaluation of her iron deficiency anemia. She is planned to have cholecystectomy by Dr. Dionicio Patel soon. She still has menorrhagia. Her GI work ups on 2/2020 didn't show any bleeding source. I still believe her iron deficiency anemia is due to chronic blood loss from menorrhagia and I recommend her to continue with ferrous sulfate 650 mg daily. Her anemia is starting to getting better on 4/22/2022 blood test.  Will re evaluate her iron status in 4 months. If she continues to have iron deficiency at that time, I will consider for capsule endoscopy. Since she is going to have cholecystectomy, I will recheck her von Willebrand panel to make sure that she does not have von Willebrand disease before surgery. I will follow-up with von Willebrand panel done today. I answered all her questions and concerns for today. I asked her to follow-up with primary care physician on regular basis. Recent imaging and labs were reviewed and discussed with the patient.

## 2022-05-09 NOTE — PROGRESS NOTES
MA Rooming Questions  Patient: Joselyn Darden  MRN: [id number]    Date: 5/9/2022        1. Do you have any new issues?   no         2. Do you need any refills on medications?    no    3. Have you had any imaging done since your last visit?   no    4. Have you been hospitalized or seen in the emergency room since your last visit here?   no    5. Did the patient have a depression screening completed today?  Yes    PHQ-9 Total Score: 0 (5/9/2022 10:29 AM)       PHQ-9 Given to (if applicable):               PHQ-9 Score (if applicable):                     [] Positive     [x]  Negative              Does question #9 need addressed (if applicable)                     [] Yes    []  No               Aleida Mao CMA

## 2022-05-12 ENCOUNTER — NURSE ONLY (OUTPATIENT)
Dept: FAMILY MEDICINE CLINIC | Age: 42
End: 2022-05-12
Payer: COMMERCIAL

## 2022-05-12 DIAGNOSIS — F90.2 ATTENTION DEFICIT HYPERACTIVITY DISORDER (ADHD), COMBINED TYPE: Primary | ICD-10-CM

## 2022-05-12 DIAGNOSIS — Z51.81 MEDICATION MONITORING ENCOUNTER: ICD-10-CM

## 2022-05-12 LAB
FACTOR VIII ACTIVITY: 166 % (ref 56–191)
RISTOCETIN CO-FACTOR: 75 % (ref 51–215)
VON WILLEBRAND AG: 117 % (ref 52–214)

## 2022-05-12 PROCEDURE — 99212 OFFICE O/P EST SF 10 MIN: CPT | Performed by: FAMILY MEDICINE

## 2022-05-12 NOTE — PROGRESS NOTES
Patient missed yesterday's nurse visit appointment for urine drug screen and treatment agreement, treatment agreement generated today and lab order pended inside this nurse visit.

## 2022-05-12 NOTE — LETTER
Controlled Medication Treatment Agreement  [facility]      Date of Agreement: 22    Patient Name: Gerhardt Captain      Patient : 1980      To assist patients with certain conditions alternate types of medications may be used to help manage your treatment better and to help improve your social and work activities. The following prescribed medications need to be monitored more frequently and will require you to partner and assist in your healthcare. This alternative type of treatment does have risks and these will be discussed with you. Medication Dose Instructions Quantity Per Month                                     USE OF MEDICAL MARIJUANA IN ANY FORM WILL PRECLUDE THIS OFFICE OR PROVIDER FROM PRESCRIBING ANY CONTROLLED MEDICATIONS TO YOU FOR MORE THAN 7 DAYS FOR ANY REASON. IF YOU CHOOSE TO USE MEDICAL MARIJUANA ANY LONG TERM CONTROLLED PRESCRIPTIONS WILL BE DISCONTINUED IMMEDIATELY. Benefits and Goals of Controlled Substance Medications:    Controlled substances include certain medications used for treatment of pain, treatment of anxiety, treatment for attention-deficit disorders. There are also some uses of controlled medications for gastrointestinal illness, seizures, and hormonal deficiencies. Most controlled medications are prescribed for pain control but in each case the risks and benefits for each medication will be evaluated case by case basis. There are two potential goals for your treatment with controlled pain medications: (1) lower pain (2) improved daily life functions. There are many possible treatments for your chronic condition and we will try alternatives to medication as well. These may include; physical therapy, yoga, massage, home daily exercise, meditation, relaxation techniques, injections, chiropractic manipulations, surgery, cognitive therapy, hypnosis and many medications that are not addicting.   Management of chronic pain specifically via medications can help but, it will not remove all of your pain and may not restore all function. Risks of Controlled Substance Medications: These medications can lead to problems such as addiction, sedation, falls, constipation, nausea, vomiting, or overdose. They may cause sleepiness, lightheadedness, slow thinking and may impair you from driving or using equipment. They may cause problems with breathing, sleep apnea, reduced coughing, these are especially dangerous for patients with lung disease. The medications may also lower testosterone, loss of bone strength, stamina and sex drive. For opiate medications, women who are of child bearing age 14-53 who could become pregnant should weigh the risks carefully with their physician as these medications make pregnancy more risky and the baby could be born sick and or addicted and have complications. For opiate medications for pain and benzodiazepine medications for anxiety, its possible to have dangerous interactions with alcohol and other medications. You may have an increase in pain called hyperalgesia, where the opioid medication can affect the brain and nerves that may cause increased pain and you may have trouble getting pain relief. To reduce the risks of harm to patients, we work to address lowering pain medication and improving your function. Dependence withdrawal symptoms may include; feelings of uneasiness, increased pain, irritability, belly pain, diarrhea, sweats and goose-flesh.    _____________________________________________________________________    In order to receive chronic pain medication or other medications with addictive potential from our office, we require that you read and understand our policy outlined in this agreement regarding our prescribing of these medications.   Continuous or chronic use of this type of medication involves risks for you as a patient and also for this office in that we need to ascertain that its use is helpful not harmful, appropriate and legal.  We will ask that you initial and sign that you have read and acknowledged its guidelines annually. You MUST be seen every 3 months to be prescribed these medications. This is an absolute legal requirement. These medications are highly attractive to persons with chemical dependency and theft and break ins are common. Do not tell anyone you are taking them and do not leave or store them where other people can see or access them. Violation of any of the following guidelines will result in the immediate cessation of prescribing these medications to you, and may result in dismissal from our practice. We also reserve the right to inform any co-prescribers or consultants to your healthcare  of this violation. By law, the prescribers in this office will also reference a state database known as [facility] Automated Prescription Reporting System) when they are managing your use of potentially addicting drugs. This database records dispensation of dangerous or addictive drugs by any pharmacy in the [place]. 1. I understand that I have the following responsibilities:    · I will take medications at the dose and frequency prescribed. · I will not increase or change how I take my medications without the approval of the health care provider who signs this Medication Agreement. · I will arrange for refills at the prescribed interval ONLY during regular office hours. I will not ask for refills earlier than agreed, after-hours, on holidays or on weekends. · I will obtain all refills for these medications at the pharmacy listed on the last page, with full consent for my provider and pharmacist to exchange information in writing or verbally. · I will not request any pain medications or controlled substances from other providers and will inform this provider of all other medications I am taking.   · I will inform my other health care providers that I am taking these medications and of the existence of this TREATMENT AGREEMENT. In the event of an emergency, I will provide the same information to the emergency department providers. · I will protect my prescriptions and medications. I understand that lost or misplaced prescriptions will not be replaced. · I will keep medications only for my own use and will not share them with others. I will keep all medications away from children. · I agree to participate in any medical, psychological or psychiatric assessments recommended by my provider. · I will actively participate in any program designed to improve function, including social, physical, psychological and daily or work activities. · If you are on chronic opioid (narcotic) therapy is only one part of my overall pain management plan. Initiation of chronic opioid therapy is considered a trial and if no benefit to my daily function or quality of life is obtained or side effects occur these drugs may be tapered and withdrawn. · Stimulant medications carry a lower risk of dependency but have great potential for misuse, overuse and DIVERSION -- which means use by the intended patient or others for reason other than original therapeutic effect. Evidence of diversion is reason for discontinuation of therapy and probable dismissal from the practice. 2. I will not use illegal or street drugs or another person's prescription. If I have an       addiction problem with drugs or alcohol and my provider asks me to enter a program       to address this issue, I agree to follow through. Such programs may include:  · 12-Step program and securing a sponsor  · Individual counseling   · Inpatient or outpatient treatment  · Other:___________________________    If in treatment, I will request that a copy of the programs initial evaluation and treatment recommendations be sent to this provider and will not expect refills until that is received.  I will also request written monthly updates be sent to this provider to verify my continuing treatment. 3. I will consent to a minimum of annual and additionally random drug screening to assure I am only taking the prescribed drugs, described in this TREATMENT AGREEMENT. I understand that a drug screen is a laboratory test in which a sample of my urine or blood is checked to see what drugs I have been taking. 4. I will keep all my scheduled appointments. If I need to cancel my appointment I will do so, a minimum of 24 hours before it is scheduled. 5. I understand that this provider may stop prescribing the medications listed if:    · I do not show any improvement in pain or my activity has not improved. · I develop rapid tolerance or loss of improvement as described in my treatment plan. · I develop significant side effects from the medication. · My behavior is inconsistent with the responsibilities outlined above, which may also result in being prevented from receiving further care from this office. HOWEVER, if inappropriate use occurs the prescriber is NOT required to continue any prescription just to prevent withdrawal but as legally indicated may refer the patient to an outside addiction specialist to manage a withdrawal syndrome. If you are having withdrawal symptoms you may need to seek care at an emergency facility. AGREEMENT:  I have read the above and have had all my questions answered. For chronic pain management, I know that chronic pain can be managed with many types of treatments. A chronic opioid trial may be part of my treatment but I must be an active participant in my care. Opioid medication is only one part of my pain management. There is limited scientific data to suggest that using opioids over 4 months will lower my pain and or improve my daily function.   There is some scientific information that suggests using chronic opioids can increase my pain, make me feel less well, and increase my risk of unintentional death directly related to the opioid medication. I know that if my provider feels my risk from controlled medications and or opioid therapy is greater than my benefit, I will have my controlled substance medications and or opioid medication compassionately lowered or removed altogether. I agree to a controlled substance medication or chronic opioid trial.  I further agree to allow this office to contact family or friends if there are concerns about my safety and use of the controlled medications. I____________________________________ have agreed to use the following medications above as instructed by my physician and as stated in this Treatment Agreement.          Medication ___________________________       Last dose _________________    Medication ___________________________       Last dose _________________    Medication ___________________________       Last dose _________________            Pharmacy __________________________________________________          Patient Signature:  _______________________________      Date:    _______________________________    Patient Name Printed:  _______________________________    Physician Signature:  ________________________________        Date:   ________________________________

## 2022-05-16 LAB — VON WILLEBRAND MULTIMERS: NORMAL

## 2022-05-17 LAB
6-ACETYLMORPHINE: NOT DETECTED
7-AMINOCLONAZEPAM: NOT DETECTED
ALPHA-OH-ALPRAZOLAM: NOT DETECTED
ALPHA-OH-MIDAZOLAM, URINE: NOT DETECTED
ALPRAZOLAM: NOT DETECTED
AMPHETAMINE: PRESENT
BARBITURATES: NOT DETECTED
BENZOYLECGONINE: NOT DETECTED
BUPRENORPHINE: NOT DETECTED
CARISOPRODOL: NOT DETECTED
CLONAZEPAM: NOT DETECTED
CODEINE: NOT DETECTED
CREATININE URINE: 254.1 MG/DL (ref 20–400)
DIAZEPAM: NOT DETECTED
DRUGS EXPECTED: NORMAL
EER PAIN MGT DRUG PANEL, HIGH RES/EMIT U: NORMAL
ETHYL GLUCURONIDE: NOT DETECTED
FENTANYL: NOT DETECTED
GABAPENTIN: NOT DETECTED
HYDROCODONE: NOT DETECTED
HYDROMORPHONE: NOT DETECTED
LORAZEPAM: NOT DETECTED
MARIJUANA METABOLITE: NOT DETECTED
MDA: NOT DETECTED
MDEA: NOT DETECTED
MDMA URINE: NOT DETECTED
MEPERIDINE: NOT DETECTED
METHADONE: NOT DETECTED
METHAMPHETAMINE: NOT DETECTED
METHYLPHENIDATE: NOT DETECTED
MIDAZOLAM: NOT DETECTED
MORPHINE: NOT DETECTED
NALOXONE: NOT DETECTED
NORBUPRENORPHINE, FREE: NOT DETECTED
NORDIAZEPAM: NOT DETECTED
NORFENTANYL: NOT DETECTED
NORHYDROCODONE, URINE: NOT DETECTED
NOROXYCODONE: NOT DETECTED
NOROXYMORPHONE, URINE: NOT DETECTED
OXAZEPAM: NOT DETECTED
OXYCODONE: NOT DETECTED
OXYMORPHONE: NOT DETECTED
PAIN MANAGEMENT DRUG PANEL: NORMAL
PAIN MANAGEMENT DRUG PANEL: NORMAL
PCP: NOT DETECTED
PHENTERMINE: NOT DETECTED
PREGABALIN: NOT DETECTED
TAPENTADOL, URINE: NOT DETECTED
TAPENTADOL-O-SULFATE, URINE: NOT DETECTED
TEMAZEPAM: NOT DETECTED
TRAMADOL: NOT DETECTED
ZOLPIDEM: NOT DETECTED

## 2022-05-18 NOTE — RESULT ENCOUNTER NOTE
Urine drug screen is appropriate and consistent with medications as prescribed. No changes to therapy these look good. Not routed for staff call but released to [facility] only.

## 2022-05-23 ENCOUNTER — TELEPHONE (OUTPATIENT)
Dept: FAMILY MEDICINE CLINIC | Age: 42
End: 2022-05-23

## 2022-05-23 DIAGNOSIS — F90.2 ATTENTION DEFICIT HYPERACTIVITY DISORDER (ADHD), COMBINED TYPE: ICD-10-CM

## 2022-05-23 RX ORDER — DEXTROAMPHETAMINE SACCHARATE, AMPHETAMINE ASPARTATE, DEXTROAMPHETAMINE SULFATE AND AMPHETAMINE SULFATE 1.25; 1.25; 1.25; 1.25 MG/1; MG/1; MG/1; MG/1
5 TABLET ORAL 2 TIMES DAILY
Qty: 60 TABLET | Refills: 0 | Status: SHIPPED | OUTPATIENT
Start: 2022-05-23 | End: 2022-06-20 | Stop reason: SDUPTHER

## 2022-05-23 NOTE — TELEPHONE ENCOUNTER
Medication:   Adderall 5mg     Last Filled:  4/20/2022 disp 60w/ 0 refills. Patient Phone Number: [phone] (home)     Last appt: 4/13/2022   Next appt: Visit date not found    Last OARRS: No flowsheet data found. Patient uses [facility] [address].

## 2022-06-03 LAB
BASOPHILS ABSOLUTE: 0 /ΜL
BASOPHILS RELATIVE PERCENT: 0.2 %
BUN BLDV-MCNC: 13 MG/DL
CALCIUM SERPL-MCNC: 9.2 MG/DL
CHLORIDE BLD-SCNC: 105 MMOL/L
CO2: 25 MMOL/L
CREAT SERPL-MCNC: 0.7 MG/DL
EOSINOPHILS ABSOLUTE: 0.1 /ΜL
EOSINOPHILS RELATIVE PERCENT: 1.6 %
GFR CALCULATED: >60
GLUCOSE BLD-MCNC: 88 MG/DL
HCT VFR BLD CALC: 34.1 % (ref 36–46)
HEMOGLOBIN: 11.2 G/DL (ref 12–16)
INR BLD: 1
LYMPHOCYTES ABSOLUTE: 1.8 /ΜL
LYMPHOCYTES RELATIVE PERCENT: 23.3 %
MCH RBC QN AUTO: 29.1 PG
MCHC RBC AUTO-ENTMCNC: 32.7 G/DL
MCV RBC AUTO: 88.8 FL
MONOCYTES ABSOLUTE: 0.5 /ΜL
MONOCYTES RELATIVE PERCENT: 6.7 %
NEUTROPHILS ABSOLUTE: 5.3 /ΜL
NEUTROPHILS RELATIVE PERCENT: 68.2 %
PLATELET # BLD: 314 K/ΜL
PMV BLD AUTO: ABNORMAL FL
POTASSIUM SERPL-SCNC: 4.3 MMOL/L
PROTIME: 11.7 SECONDS
RBC # BLD: 3.84 10^6/ΜL
SODIUM BLD-SCNC: 136 MMOL/L
WBC # BLD: 7.8 10^3/ML

## 2022-06-07 ENCOUNTER — OFFICE VISIT (OUTPATIENT)
Dept: FAMILY MEDICINE CLINIC | Age: 42
End: 2022-06-07
Payer: COMMERCIAL

## 2022-06-07 VITALS
BODY MASS INDEX: 42.57 KG/M2 | HEART RATE: 84 BPM | WEIGHT: 248 LBS | DIASTOLIC BLOOD PRESSURE: 82 MMHG | SYSTOLIC BLOOD PRESSURE: 124 MMHG | OXYGEN SATURATION: 97 %

## 2022-06-07 DIAGNOSIS — D50.9 MICROCYTIC ANEMIA: ICD-10-CM

## 2022-06-07 DIAGNOSIS — Z86.16 HISTORY OF 2019 NOVEL CORONAVIRUS DISEASE (COVID-19): ICD-10-CM

## 2022-06-07 DIAGNOSIS — R04.2 HEMOPTYSIS: Primary | ICD-10-CM

## 2022-06-07 DIAGNOSIS — R06.09 DYSPNEA ON EXERTION: ICD-10-CM

## 2022-06-07 PROCEDURE — 1036F TOBACCO NON-USER: CPT | Performed by: FAMILY MEDICINE

## 2022-06-07 PROCEDURE — G8427 DOCREV CUR MEDS BY ELIG CLIN: HCPCS | Performed by: FAMILY MEDICINE

## 2022-06-07 PROCEDURE — G8417 CALC BMI ABV UP PARAM F/U: HCPCS | Performed by: FAMILY MEDICINE

## 2022-06-07 PROCEDURE — 99213 OFFICE O/P EST LOW 20 MIN: CPT | Performed by: FAMILY MEDICINE

## 2022-06-07 NOTE — PROGRESS NOTES
Chief Complaint   Patient presents with    Cough     pt reports coughing up blood clots. pt seen in ER chest X Ray was normal Doctor in ER said lung sounds were diminished and discharged pt        [place] NARRATIVE:    No resp illness, but coughing up \"a lot\" of blood. Sometimes large chumks, with blood clot. Sometimes just red specks. Per ER report: Breath sounds were diminished. She is SOB, like tightness when she takes a deep breath. AGUAYO from carrying groceries or going downstairs to [employer]. Some cough with deep breathing. She was treated with ATB and steroids. All starting Thursday, waking up Friday with sx. Had covid neg sreen 4/27 as preop test.    Had significant anemia, and saw Dr. Irma Wheatley earlier this spring. He thought menometrorrhagia, prescribed iron supplement and recommended recheck 4 months. She is also waiting to get her GB out. Continues to have GB and belly fullness. Already scheduled to see Jann Henderson in next few days for this hemoptysis. Patient is established unless otherwise noted. Above chief complaint and [place] obtained by this physician provider. Review of Systems   Constitutional: Negative for activity change, chills, fatigue and fever. HENT: Negative for congestion. Respiratory: Positive for cough (With blood and clots) and shortness of breath. Negative for chest tightness and wheezing. Cardiovascular: Negative for chest pain and leg swelling. Gastrointestinal: Negative for abdominal pain. Musculoskeletal: Negative for back pain and myalgias. Skin: Negative for rash. Psychiatric/Behavioral: Negative for behavioral problems and dysphoric mood. Allergies   Allergen Reactions    Buspar [Buspirone]     Zoloft [Sertraline Hcl]      Allergy historyupdated.     Outpatient Medications Marked as Taking for the 6/7/22 encounter (Office Visit) with Loyal Angelucci, MD   Medication Sig Dispense Refill    [DISCONTINUED] amphetamine-dextroamphetamine (ADDERALL, 5MG,) 5 MG tablet Take 1 tablet by mouth 2 times daily for 30 days. (6-8 hours apart) 60 tablet 0    ferrous sulfate (IRON 325) 325 (65 Fe) MG tablet Take 1 tablet by mouth 2 times daily 180 tablet 2    acetaminophen (TYLENOL) 500 MG tablet Take 2 tablets by mouth      ondansetron (ZOFRAN ODT) 4 MG disintegrating tablet Take 1 tablet by mouth every 8 hours as needed for Nausea or Vomiting 30 tablet 5    pantoprazole (PROTONIX) 40 MG tablet Take 1 tablet by mouth 2 times daily 60 tablet 5    famotidine (PEPCID) 40 MG tablet Take 1 tablet by mouth every evening 30 tablet 5    tiZANidine (ZANAFLEX) 4 MG tablet TAKE 1 TABLET BY MOUTH EVERY DAY AT NIGHT 30 tablet 5    valACYclovir (VALTREX) 1 g tablet Take 1 g by mouth daily      Multiple Vitamins-Minerals (MULTI COMPLETE PO) Take by mouth         Tobacco use history updated. Social History     Tobacco Use   Smoking Status Former Smoker    Types: Cigarettes    Quit date: 1998    Years since quittin.9   Smokeless Tobacco Never Used        Nursing note reviewed. Vitals:    22 1129   BP: 124/82   Site: Left Upper Arm   Position: Sitting   Cuff Size: Medium Adult   Pulse: 84   SpO2: 97%   Weight: 248 lb (112.5 kg)          BP Readings from Last 3 Encounters:   22 124/82   22 122/61   22 122/70     Wt Readings from Last 3 Encounters:   22 250 lb (113.4 kg)   22 250 lb (113.4 kg)   22 248 lb (112.5 kg)     Body mass index is 42.57 kg/m². No results found for this visit on 22. Physical Exam  Vitals and nursing note reviewed. Constitutional:       General: She is not in acute distress. Appearance: She is well-developed. She is not diaphoretic. HENT:      Head: Normocephalic. Eyes:      General:         Right eye: No discharge. Left eye: No discharge. Conjunctiva/sclera: Conjunctivae normal.      Pupils: Pupils are equal, round, and reactive to light. Cardiovascular:      Rate and Rhythm: Normal rate and regular rhythm. Heart sounds: Normal heart sounds. No murmur heard. Pulmonary:      Effort: Pulmonary effort is normal. No respiratory distress. Breath sounds: Normal breath sounds. No wheezing or rales. Musculoskeletal:      Cervical back: Normal range of motion. Skin:     General: Skin is warm and dry. Neurological:      Mental Status: She is alert and oriented to person, place, and time. Psychiatric:         Behavior: Behavior normal.           _________________________________________________  Assessment:     1. Hemoptysis  2. Dyspnea on exertion  3. Microcytic anemia  4. History of 2019 novel coronavirus disease (COVID-19)    We will await input from pulmonology. It does not sound there are any interventions to do right now. She may need additional imaging and or bronchoscopy if hemoptysis persists. Problems listed above are stable and therapeutic plan is unchanged unless otherwise specified. See orders above and comments below for details of workup or medication orders. _________________________________________________  Plan:     Return if symptoms worsen or fail to improve, for As scheduled.       Electronically signed by Tu Deshpande MD on 6/7/22 at 12:15 PM EDT

## 2022-06-14 ENCOUNTER — HOSPITAL ENCOUNTER (OUTPATIENT)
Dept: CT IMAGING | Age: 42
Discharge: HOME | End: 2022-06-14
Payer: COMMERCIAL

## 2022-06-14 DIAGNOSIS — R04.2 HEMOPTYSIS: ICD-10-CM

## 2022-06-14 PROCEDURE — 71275 CT ANGIOGRAPHY CHEST: CPT

## 2022-06-14 PROCEDURE — 6360000004 HC RX CONTRAST MEDICATION: Performed by: INTERNAL MEDICINE

## 2022-06-14 RX ORDER — SODIUM CHLORIDE 0.9 % (FLUSH) 0.9 %
5-40 SYRINGE (ML) INJECTION PRN
Status: DISCONTINUED | OUTPATIENT
Start: 2022-06-14 | End: 2022-06-15 | Stop reason: HOSPADM

## 2022-06-14 RX ADMIN — IOPAMIDOL 95 ML: 755 INJECTION, SOLUTION INTRAVENOUS at 11:30

## 2022-06-20 DIAGNOSIS — F90.2 ATTENTION DEFICIT HYPERACTIVITY DISORDER (ADHD), COMBINED TYPE: ICD-10-CM

## 2022-06-20 RX ORDER — DEXTROAMPHETAMINE SACCHARATE, AMPHETAMINE ASPARTATE, DEXTROAMPHETAMINE SULFATE AND AMPHETAMINE SULFATE 1.25; 1.25; 1.25; 1.25 MG/1; MG/1; MG/1; MG/1
5 TABLET ORAL 2 TIMES DAILY
Qty: 60 TABLET | Refills: 0 | Status: SHIPPED | OUTPATIENT
Start: 2022-06-20 | End: 2022-07-22 | Stop reason: SDUPTHER

## 2022-06-25 ASSESSMENT — ENCOUNTER SYMPTOMS
COUGH: 1
BACK PAIN: 0
ABDOMINAL PAIN: 0
CHEST TIGHTNESS: 0
WHEEZING: 0
SHORTNESS OF BREATH: 1

## 2022-06-28 ENCOUNTER — HOSPITAL ENCOUNTER (OUTPATIENT)
Age: 42
Setting detail: SPECIMEN
Discharge: HOME | End: 2022-06-28
Payer: COMMERCIAL

## 2022-06-28 ENCOUNTER — OFFICE VISIT (OUTPATIENT)
Dept: FAMILY MEDICINE CLINIC | Age: 42
End: 2022-06-28
Payer: COMMERCIAL

## 2022-06-28 VITALS
HEART RATE: 100 BPM | TEMPERATURE: 101.1 F | OXYGEN SATURATION: 98 % | WEIGHT: 251.4 LBS | RESPIRATION RATE: 12 BRPM | BODY MASS INDEX: 43.15 KG/M2

## 2022-06-28 DIAGNOSIS — J06.9 VIRAL URI WITH COUGH: Primary | ICD-10-CM

## 2022-06-28 LAB
INFLUENZA A ANTIBODY: NORMAL
INFLUENZA B ANTIBODY: NORMAL

## 2022-06-28 PROCEDURE — G8417 CALC BMI ABV UP PARAM F/U: HCPCS | Performed by: PHYSICIAN ASSISTANT

## 2022-06-28 PROCEDURE — U0005 INFEC AGEN DETEC AMPLI PROBE: HCPCS

## 2022-06-28 PROCEDURE — 99213 OFFICE O/P EST LOW 20 MIN: CPT | Performed by: PHYSICIAN ASSISTANT

## 2022-06-28 PROCEDURE — G8427 DOCREV CUR MEDS BY ELIG CLIN: HCPCS | Performed by: PHYSICIAN ASSISTANT

## 2022-06-28 PROCEDURE — 1036F TOBACCO NON-USER: CPT | Performed by: PHYSICIAN ASSISTANT

## 2022-06-28 PROCEDURE — U0003 INFECTIOUS AGENT DETECTION BY NUCLEIC ACID (DNA OR RNA); SEVERE ACUTE RESPIRATORY SYNDROME CORONAVIRUS 2 (SARS-COV-2) (CORONAVIRUS DISEASE [COVID-19]), AMPLIFIED PROBE TECHNIQUE, MAKING USE OF HIGH THROUGHPUT TECHNOLOGIES AS DESCRIBED BY CMS-2020-01-R: HCPCS

## 2022-06-28 PROCEDURE — 87804 INFLUENZA ASSAY W/OPTIC: CPT | Performed by: PHYSICIAN ASSISTANT

## 2022-06-28 NOTE — PROGRESS NOTES
2022    [facility]    Chief Complaint   Patient presents with    Cough       HPI  History was obtained from patient. Abiel Cazares is a 43 y.o. female who presents today with complaints of <48 hour history of cough, nasal congestion, headache, sore throat, myalgias and nausea. Symptoms started  evening. She woke up this morning feeling much worse with severe body aches. She denies chest pain, chest tightness or heaviness, shortness of breath or wheezing, fever or chills. She denies vomiting, diarrhea, loss of taste or smell. She denies current hemoptysis but states a few weeks ago, she was coughing up blood and seeked ED evaluation. She then followed up with a pulmonologist.  Per patient, she had a negative chest x-ray and a negative CT of chest.  She denies leg pain or swelling. She admits to recent travel to several states via vehicle with her girlfriends. She went to [place], [place], [place], [place]. Came home yesterday. No known COVID exposure. She is not vaccinated against the virus. She takes a daily multivitamin and supplements with vitamin D and C. She has not tried any over-the-counter medications for current symptoms.         PAST MEDICAL HISTORY  Past Medical History:   Diagnosis Date    History of pneumonia     Pneumonia of right upper lobe due to infectious organism 2016       FAMILY HISTORY  Family History   Problem Relation Age of Onset    Drug Abuse Father     Mult Sclerosis Sister     Colon Cancer Maternal Grandfather     Stomach Cancer Neg Hx        SOCIAL HISTORY  Social History     Socioeconomic History    Marital status:      Spouse name: None    Number of children: None    Years of education: None    Highest education level: None   Occupational History    None   Tobacco Use    Smoking status: Former Smoker     Types: Cigarettes     Quit date: 1998     Years since quittin.9    Smokeless tobacco: Never Used   Vaping Use    Vaping Use: Never used   Substance and Sexual Activity    Alcohol use: Yes     Alcohol/week: 0.0 standard drinks     Comment: occ, caffeine-4 servings daily average    Drug use: No    Sexual activity: Yes     Partners: Male     Comment:    Other Topics Concern    None   Social History Narrative    None     Social Determinants of Health     Financial Resource Strain:     Difficulty of Paying Living Expenses: Not on file   Food Insecurity:     Worried About Running Out of Food in the Last Year: Not on file    [place] of Food in the Last Year: Not on file   Transportation Needs:     Lack of Transportation (Medical): Not on file    Lack of Transportation (Non-Medical):  Not on file   Physical Activity:     Days of Exercise per Week: Not on file    Minutes of Exercise per Session: Not on file   Stress:     Feeling of Stress : Not on file   Social Connections:     Frequency of Communication with Friends and Family: Not on file    Frequency of Social Gatherings with Friends and Family: Not on file    Attends Protestant Services: Not on file    Active Member of [address] [place] [address] or Organizations: Not on file    Attends Club or Organization Meetings: Not on file    Marital Status: Not on file   Intimate Partner Violence:     Fear of Current or Ex-Partner: Not on file    Emotionally Abused: Not on file    Physically Abused: Not on file    Sexually Abused: Not on file   Housing Stability:     Unable to Pay for Housing in the Last Year: Not on file    Number of [place] in the Last Year: Not on file    Unstable Housing in the Last Year: Not on file        SURGICAL HISTORY  Past Surgical History:   Procedure Laterality Date     SECTION  06   [address]  [address]    COLONOSCOPY N/A 2020    COLONOSCOPY POLYPECTOMY SNARE/COLD BIOPSY performed by Carissa Whitaker MD [facility] 2/5/2020    EGD BIOPSY performed by Ryan Hendrix MD at [address]  Current Outpatient Medications   Medication Sig Dispense Refill    amphetamine-dextroamphetamine (ADDERALL, 5MG,) 5 MG tablet Take 1 tablet by mouth 2 times daily for 30 days. (6-8 hours apart) 60 tablet 0    ferrous sulfate (IRON 325) 325 (65 Fe) MG tablet Take 1 tablet by mouth 2 times daily 180 tablet 2    acetaminophen (TYLENOL) 500 MG tablet Take 2 tablets by mouth      ondansetron (ZOFRAN ODT) 4 MG disintegrating tablet Take 1 tablet by mouth every 8 hours as needed for Nausea or Vomiting 30 tablet 5    pantoprazole (PROTONIX) 40 MG tablet Take 1 tablet by mouth 2 times daily 60 tablet 5    famotidine (PEPCID) 40 MG tablet Take 1 tablet by mouth every evening 30 tablet 5    tiZANidine (ZANAFLEX) 4 MG tablet TAKE 1 TABLET BY MOUTH EVERY DAY AT NIGHT 30 tablet 5    valACYclovir (VALTREX) 1 g tablet Take 1 g by mouth daily      Multiple Vitamins-Minerals (MULTI COMPLETE PO) Take by mouth       No current facility-administered medications for this visit. ALLERGIES  Allergies   Allergen Reactions    Buspar [Buspirone]     Zoloft [Sertraline Hcl]        PHYSICAL EXAM    Pulse 100   Temp (!) 101.1 °F (38.4 °C)   Resp 12   Wt 251 lb 6.4 oz (114 kg)   SpO2 98%   BMI 43.15 kg/m²     Constitutional:  Well developed, well nourished. Appears as if she does not feel well. No distress. Pleasant and cooperative. HENT:  Normocephalic, atraumatic, bilateral external ears normal, bilateral ear canals and TMs normal, oropharynx moist postnasal drip noted. No petechiae or exudate. Uvula midline. Airway benign. Nasal mucosa congested. Clear rhinorrhea.   Eyes:  conjunctiva normal, no discharge, no scleral icterus  Neck:  No tenderness, supple  Lymphatic:  No lymphadenopathy noted  Cardiovascular:  Normal heart rate, normal rhythm, no murmurs, gallops or rubs  Thorax & Lungs:  Normal breath sounds, no respiratory distress, no wheezing, no rales, no rhonchi  Skin:  Warm, dry, no erythema, no rash  Extremities:  No edema, no tenderness, no cyanosis, no palpable cord  Neurologic:  Alert & oriented   Psychiatric:  Affect normal, mood normal    ASSESSMENT & PLAN    Rivera Whelan was seen today for cough. Diagnoses and all orders for this visit:    Viral URI with cough  -     COVID-19  -     POCT Influenza A/B      Influenza negative  COVID-19 test results pending  Increase fluids and rest  Saline nasal spray as needed for nasal congestion  Warm salt gargles as needed for throat discomfort  Monitor temperature twice a day  Tylenol as needed for fevers and/or discomfort. Big deep breaths periodically throughout the day  Regular Mucinex over the counter as needed for chest congestion  If symptoms worsen -Go to the ER. Follow up with your primary care provider    There are no discontinued medications. No follow-ups on file. Patient verbalizes understanding with the above plan and is in agreement. Patient will call with  questions or concerns. I did don appropriate PPE (including N95 face mask, protective safety glasses, face shield, gloves, and gown) as recommended by the health facility/national standard best practice, during my interaction with the patient. Please note that this chart was generated using dragon dictation software. Although every effort was made to ensure the accuracy of this automated transcription, some errors in transcription may have occurred.     Electronically signed by Oksana Ledesma PA-C on 6/28/2022

## 2022-06-28 NOTE — PATIENT INSTRUCTIONS
Your COVID 19 test can take 1-5 days for the results to come back. We ask that you make a Mychart page and view your test results this way. You will need to Self quarantine until you know your results. If positive, please work on contact tracing. Influenza negative  COVID-19 test results pending  Increase fluids and rest  Saline nasal spray as needed for nasal congestion  Warm salt gargles as needed for throat discomfort  Monitor temperature twice a day  Tylenol as needed for fevers and/or discomfort. Big deep breaths periodically throughout the day  Regular Mucinex over the counter as needed for chest congestion  If symptoms worsen -Go to the ER. Follow up with your primary care provider      To Whom it May Concern:    Jenn Galarza was tested for COVID-19 6/28/2022. He/she must stay home until test results are back. If test is positive, isolate for a total of 5 days, starting from day 1 of symptom onset. After 5 days, if fever-free for 24 hours and there has been a gradual improvement in symptoms, may come out of isolation, but must consistently wear a mask when around other people for 5 additional days. (5 days isolation, 5 days mask-wearing). We do not recommend retesting as patients may continue to test positive for months even though no longer contagious. It is suggested you call [address] [facility] or [facility] with any questions regarding isolation timeframe/return to work/school details. Johney Dubin, PA-C          Patient Education        Viral Respiratory Infection: Care Instructions  Overview     A viral respiratory infection is an infection of the nose, sinuses, or throat caused by a virus. Colds and the flu are common types of viral respiratoryinfections. The symptoms of a viral respiratory infection often start quickly. They include a fever, sore throat, and runny nose. You may also just not feel well. Or youmay not want to eat much.   Most viral infections can be treated with home care. This may include drinking lots of fluids and taking over-the-counter pain medicine. You will probablyfeel better in 4 to 10 days. Antibiotics are not used to treat a viral infection. Antibiotics don't killviruses, so they won't help cure a viral illness. In some cases, a doctor may prescribe antiviral medicine to help your bodyfight a serious viral infection. Follow-up care is a key part of your treatment and safety. Be sure to make and go to all appointments, and call your doctor if you are having problems. It's also a good idea to know your test results and keep alist of the medicines you take. How can you care for yourself at home?  To prevent dehydration, drink plenty of fluids. Choose water and other clear liquids until you feel better. If you have kidney, heart, or liver disease and have to limit fluids, talk with your doctor before you increase the amount of fluids you drink.  Ask your doctor if you can take an over-the-counter pain medicine, such as acetaminophen (Tylenol), ibuprofen (Advil, Motrin), or naproxen (Aleve). Be safe with medicines. Read and follow all instructions on the label. No one younger than 20 should take aspirin. It has been linked to Reye syndrome, a serious illness.  Be careful when taking over-the-counter cold or flu medicines and Tylenol at the same time. Many of these medicines have acetaminophen, which is Tylenol. Read the labels to make sure that you are not taking more than the recommended dose. Too much acetaminophen (Tylenol) can be harmful.  Get plenty of rest.   Use saline (saltwater) nasal washes to help keep your nasal passages open and wash out mucus and allergens. You can buy saline nose sprays at a grocery store or drugstore. Follow the instructions on the package. Or you can make your own at home. Add 1 teaspoon of non-iodized salt and 1 teaspoon of baking soda to 2 cups of distilled or boiled and cooled water.  Fill a squeeze bottle or neti pot with the nasal wash. Then put the tip into your nostril, and lean over the sink. With your mouth open, gently squirt the liquid. Repeat on the other side.  Use a vaporizer or humidifier to add moisture to your bedroom. Follow the instructions for cleaning the machine.  Do not smoke or allow others to smoke around you. If you need help quitting, talk to your doctor about stop-smoking programs and medicines. These can increase your chances of quitting for good. When should you call for help? Call 911 anytime you think you may need emergency care. For example, call if:     You have severe trouble breathing. Call your doctor now or seek immediate medical care if:     You have a new or higher fever.      Your fever lasts more than 48 hours.      You have trouble breathing.      You have a fever with a stiff neck or a severe headache.      You are sensitive to light.      You feel very sleepy or confused. Watch closely for changes in your health, and be sure to contact your doctor if:     You do not get better as expected. Where can you learn more? Go to [email or web address] org and sign in to your [employer] account. Enter C806 in the [employer] box to learn more about \"Viral Respiratory Infection: Care Instructions. \"     If you do not have an account, please click on the \"Sign Up Now\" link. Current as of: February 9, 2022               Content Version: 13.3  © 9648-4613 Healthwise, Incorporated. Care instructions adapted under license by [facility] ([facility]). If you have questions about a medical condition or this instruction, always ask your healthcare professional. [place] [address] any warranty or liability for your use of this information.

## 2022-06-28 NOTE — PROGRESS NOTES
6/28/22  Darrell Banks  1980    FLU/COVID-19 CLINIC EVALUATION    HPI SYMPTOMS:    Employer:[employer]    [] Fevers  [] Chills  [x] Cough  [] Coughing up blood  [x] Chest Congestion  [x] Nasal Congestion  [] Feeling short of breath  [] Sometimes  [] Frequently  [] All the time  [] Chest pain  [x] Headaches  [x]Tolerable  [] Severe  [x] Sore throat  [x] Muscle aches  [x] Nausea  [] Vomiting  []Unable to keep fluids down  [] Diarrhea  []Severe    [] OTHER SYMPTOMS:      Symptom Duration:   [x] 1  [] 2   [] 3   [] 4    [] 5   [] 6   [] 7   [] 8   [] 9   [] 10   [] 11   [] 12   [] 13   [] 14   [] Longer than 14 days    Symptom course:   [x] Worsening     [] Stable     [] Improving    RISK FACTORS:    [] Pregnant or possibly pregnant  [] Age over 61  [] Diabetes  [] Heart disease  [] Asthma  [] COPD/Other chronic lung diseases  [] Active Cancer  [] On Chemotherapy  [] Taking oral steroids  [] History Lymphoma/Leukemia  [] Close contact with a lab confirmed COVID-19 patient within 14 days of symptom onset  [] History of travel from affected geographical areas within 14 days of symptom onset       VITALS:  There were no vitals filed for this visit. TESTS:    POCT FLU:  [] Positive     []Negative    ASSESSMENT:    [] Flu  [] Possible COVID-19  [] Strep    PLAN:    [] Discharge home with written instructions for:  [] Flu management  [] Possible COVID-19 infection with self-quarantine and management of symptoms  [] Follow-up with primary care physician or emergency department if worsens  [] Evaluation per physician/NP/PA in clinic  [] Sent to ER       An  electronic signature was used to authenticate this note.      --Tarsha Wade on 6/28/2022 at 10:52 AM

## 2022-06-29 ENCOUNTER — TELEPHONE (OUTPATIENT)
Dept: FAMILY MEDICINE CLINIC | Age: 42
End: 2022-06-29

## 2022-06-29 LAB
SARS-COV-2: DETECTED
SOURCE: ABNORMAL

## 2022-06-29 NOTE — TELEPHONE ENCOUNTER
Symptoms started Saturday night and was seen yesterday at the [facility] clinic by Nba Carrington. Her COVID-19 test came back positive today and the walk in clinic told her to call her PCP that she may qualify for oral medication therapy for COVID. Patient is not vaccinated. Patient uses [facility] [address]. Please advise.

## 2022-06-30 NOTE — TELEPHONE ENCOUNTER
Patient called to check status. Symptoms started Saturday night.  Current symptoms include productive cough, fever, nausea, body aches, fatigue

## 2022-06-30 NOTE — TELEPHONE ENCOUNTER
History of positive COVID test noted. Patient reported to walking that symptoms started Sunday evening. That makes this day for so she is eligible for Paxlovid. I did go ahead and send it to her pharmacy since she seems interested. She should be warned about side effects of nausea diarrhea and bad taste in mouth. Call and let us know if having worse trouble.

## 2022-07-07 ENCOUNTER — PATIENT MESSAGE (OUTPATIENT)
Dept: FAMILY MEDICINE CLINIC | Age: 42
End: 2022-07-07

## 2022-07-07 ENCOUNTER — TELEPHONE (OUTPATIENT)
Dept: FAMILY MEDICINE CLINIC | Age: 42
End: 2022-07-07

## 2022-07-07 RX ORDER — SULFAMETHOXAZOLE AND TRIMETHOPRIM 800; 160 MG/1; MG/1
1 TABLET ORAL 2 TIMES DAILY
Qty: 14 TABLET | Refills: 0 | Status: SHIPPED | OUTPATIENT
Start: 2022-07-07 | End: 2022-07-14

## 2022-07-07 NOTE — TELEPHONE ENCOUNTER
Patient called stating she has a UTI. Tested + for COVID on 6/29/22. No appts available today. Sx include frequency, urgency, painful urination, and very little urine output. Please advise.

## 2022-07-07 NOTE — TELEPHONE ENCOUNTER
I sent Bactrim to [facility] for her. She can call them to pick it up in the drive-through or can bring it to her car, technically she is okay to be in the store with a mask if she is careful and her symptoms are all gone.

## 2022-07-07 NOTE — TELEPHONE ENCOUNTER
From: Monserrat Richards  To: Dr. Montrell Hernandez: 7/7/2022 4:10 PM EDT  Subject: Bladder     I ashley left a message this morning with the  staff after I had tried to get a appt with you. I never heard back from anyone and I am in a extreme amount of pain. When I pee only a small amount comes out with what looks like small pieces of blood and what looks like the tiniest vandana I have ever seen. And when I'm actually peeing is when it is the worst. Here are a cpl pictures of what I'm talking about.

## 2022-07-21 DIAGNOSIS — F90.2 ATTENTION DEFICIT HYPERACTIVITY DISORDER (ADHD), COMBINED TYPE: ICD-10-CM

## 2022-07-22 RX ORDER — DEXTROAMPHETAMINE SACCHARATE, AMPHETAMINE ASPARTATE, DEXTROAMPHETAMINE SULFATE AND AMPHETAMINE SULFATE 1.25; 1.25; 1.25; 1.25 MG/1; MG/1; MG/1; MG/1
5 TABLET ORAL 2 TIMES DAILY
Qty: 60 TABLET | Refills: 0 | Status: SHIPPED | OUTPATIENT
Start: 2022-07-22 | End: 2022-09-19 | Stop reason: SDUPTHER

## 2022-09-02 ENCOUNTER — HOSPITAL ENCOUNTER (OUTPATIENT)
Dept: INFUSION THERAPY | Age: 42
Discharge: HOME | End: 2022-09-02
Payer: COMMERCIAL

## 2022-09-02 DIAGNOSIS — D50.0 IRON DEFICIENCY ANEMIA SECONDARY TO BLOOD LOSS (CHRONIC): ICD-10-CM

## 2022-09-02 LAB
ALBUMIN SERPL-MCNC: 4.4 GM/DL (ref 3.4–5)
ALP BLD-CCNC: 77 IU/L (ref 40–128)
ALT SERPL-CCNC: 27 U/L (ref 10–40)
ANION GAP SERPL CALCULATED.3IONS-SCNC: 9 MMOL/L (ref 4–16)
AST SERPL-CCNC: 19 IU/L (ref 15–37)
BASOPHILS ABSOLUTE: 0 K/CU MM
BASOPHILS RELATIVE PERCENT: 0.4 % (ref 0–1)
BILIRUB SERPL-MCNC: 0.3 MG/DL (ref 0–1)
BUN BLDV-MCNC: 16 MG/DL (ref 6–23)
CALCIUM SERPL-MCNC: 9.2 MG/DL (ref 8.3–10.6)
CHLORIDE BLD-SCNC: 104 MMOL/L (ref 99–110)
CO2: 24 MMOL/L (ref 21–32)
CREAT SERPL-MCNC: 0.8 MG/DL (ref 0.6–1.1)
DIFFERENTIAL TYPE: ABNORMAL
EOSINOPHILS ABSOLUTE: 0.1 K/CU MM
EOSINOPHILS RELATIVE PERCENT: 1.3 % (ref 0–3)
ERYTHROCYTE SEDIMENTATION RATE: 8 MM/HR (ref 0–20)
FERRITIN: 24 NG/ML (ref 15–150)
FOLATE: >20 NG/ML (ref 3.1–17.5)
GFR AFRICAN AMERICAN: >60 ML/MIN/1.73M2
GFR NON-AFRICAN AMERICAN: >60 ML/MIN/1.73M2
GLUCOSE BLD-MCNC: 97 MG/DL (ref 70–99)
HCT VFR BLD CALC: 35.7 % (ref 37–47)
HEMOGLOBIN: 11.7 GM/DL (ref 12.5–16)
IRON: 66 UG/DL (ref 37–145)
LACTATE DEHYDROGENASE: 174 IU/L (ref 120–246)
LYMPHOCYTES ABSOLUTE: 1.7 K/CU MM
LYMPHOCYTES RELATIVE PERCENT: 22.2 % (ref 24–44)
MCH RBC QN AUTO: 30.4 PG (ref 27–31)
MCHC RBC AUTO-ENTMCNC: 32.8 % (ref 32–36)
MCV RBC AUTO: 92.7 FL (ref 78–100)
MONOCYTES ABSOLUTE: 0.7 K/CU MM
MONOCYTES RELATIVE PERCENT: 9 % (ref 0–4)
PCT TRANSFERRIN: 19 % (ref 10–44)
PDW BLD-RTO: 12.9 % (ref 11.7–14.9)
PLATELET # BLD: 295 K/CU MM (ref 140–440)
PMV BLD AUTO: 10.4 FL (ref 7.5–11.1)
POTASSIUM SERPL-SCNC: 4.4 MMOL/L (ref 3.5–5.1)
RBC # BLD: 3.85 M/CU MM (ref 4.2–5.4)
RETICULOCYTE COUNT PCT: 1.6 % (ref 0.2–2.2)
SEGMENTED NEUTROPHILS ABSOLUTE COUNT: 5 K/CU MM
SEGMENTED NEUTROPHILS RELATIVE PERCENT: 67.1 % (ref 36–66)
SODIUM BLD-SCNC: 137 MMOL/L (ref 135–145)
TOTAL IRON BINDING CAPACITY: 355 UG/DL (ref 250–450)
TOTAL PROTEIN: 6.7 GM/DL (ref 6.4–8.2)
TSH HIGH SENSITIVITY: 1.07 UIU/ML (ref 0.27–4.2)
UNSATURATED IRON BINDING CAPACITY: 289 UG/DL (ref 110–370)
VITAMIN B-12: 714.1 PG/ML (ref 211–911)
WBC # BLD: 7.5 K/CU MM (ref 4–10.5)

## 2022-09-02 PROCEDURE — 85025 COMPLETE CBC W/AUTO DIFF WBC: CPT

## 2022-09-02 PROCEDURE — 82746 ASSAY OF FOLIC ACID SERUM: CPT

## 2022-09-02 PROCEDURE — 83615 LACTATE (LD) (LDH) ENZYME: CPT

## 2022-09-02 PROCEDURE — 82728 ASSAY OF FERRITIN: CPT

## 2022-09-02 PROCEDURE — 36415 COLL VENOUS BLD VENIPUNCTURE: CPT

## 2022-09-02 PROCEDURE — 82607 VITAMIN B-12: CPT

## 2022-09-02 PROCEDURE — 83540 ASSAY OF IRON: CPT

## 2022-09-02 PROCEDURE — 80053 COMPREHEN METABOLIC PANEL: CPT

## 2022-09-02 PROCEDURE — 85045 AUTOMATED RETICULOCYTE COUNT: CPT

## 2022-09-02 PROCEDURE — 85652 RBC SED RATE AUTOMATED: CPT

## 2022-09-02 PROCEDURE — 84443 ASSAY THYROID STIM HORMONE: CPT

## 2022-09-02 PROCEDURE — 83550 IRON BINDING TEST: CPT

## 2022-09-19 DIAGNOSIS — M79.7 FIBROMYALGIA: ICD-10-CM

## 2022-09-19 DIAGNOSIS — F90.2 ATTENTION DEFICIT HYPERACTIVITY DISORDER (ADHD), COMBINED TYPE: ICD-10-CM

## 2022-09-19 DIAGNOSIS — R11.0 NAUSEA: ICD-10-CM

## 2022-09-19 RX ORDER — ONDANSETRON 4 MG/1
4 TABLET, ORALLY DISINTEGRATING ORAL EVERY 8 HOURS PRN
Qty: 30 TABLET | Refills: 5 | Status: SHIPPED | OUTPATIENT
Start: 2022-09-19

## 2022-09-19 RX ORDER — TIZANIDINE 4 MG/1
TABLET ORAL
Qty: 30 TABLET | Refills: 5 | Status: SHIPPED | OUTPATIENT
Start: 2022-09-19

## 2022-09-19 RX ORDER — DEXTROAMPHETAMINE SACCHARATE, AMPHETAMINE ASPARTATE, DEXTROAMPHETAMINE SULFATE AND AMPHETAMINE SULFATE 1.25; 1.25; 1.25; 1.25 MG/1; MG/1; MG/1; MG/1
5 TABLET ORAL 2 TIMES DAILY
Qty: 60 TABLET | Refills: 0 | Status: SHIPPED | OUTPATIENT
Start: 2022-09-19 | End: 2022-11-01 | Stop reason: SDUPTHER

## 2022-10-04 NOTE — PROGRESS NOTES
Patient Name: Vianey Rodriguez  Patient : 1980  Patient MRN: [id number]     Primary Oncologist: Venu Renteria MD  Referring Provider: Desmond Carson MD     Date of Service: 10/6/2022      Chief Complaint:   Chief Complaint   Patient presents with    Follow-up     Patient Active Problem List:     Family history of multiple sclerosis     Chronic nonintractable headache     Dysmenorrhea     NSAID induced gastritis     Dysphagia     Epigastric pain     Gastroesophageal reflux disease without esophagitis     Blood in stool     Iron deficiency anemia secondary to blood loss (chronic)     History of 2019 novel coronavirus disease (COVID-19)     COVID-19 vaccination refused     Attention deficit hyperactivity disorder (ADHD), combined type     CCC (chronic calculous cholecystitis)    HPI:   Ms. Anupama Larkin is a 31-year-old very pleasant female with medical history significant for obesity, initially referred to me on Diane 3, 2020 for evaluation of her iron deficiency anemia. She stated that she initially presented with abdomnal pain, bloating and anemia. She was evaluated by Dr. Leonides Nguyen and she had EGD/colonoscopy on 2020. Her EGD revealed normal esophagus, EG junction at 40 cm, small hiatal hernia (1.5 cm) and gastritis noted in stomach antrum. Her stomach biopsies revealed mild chronic gastritis with no evidence of H. Pylori infection. Her GEJ biopsies revealed mild chronic inflammation with no evidence of Schreiber's esophagus. Her colonoscopy revealed grade 1 hemorrhoids and hyperplastic polyp removed from her sigmoid colon. Her random right and left colon biopsies revealed no evidence of microscopic colitis. Anemia work ups on May 11, 2020 was consistent with iron deficiency anemia (Ferritin 8.5 ng/ml, TIBC 429 and iron saturation 6%). She has menorrhagia since she was young. She also has family history of type 1 von Willebrand disease.      She was subsequently referred to me for evaluation of her anemia and possible type 1 vWD. I started ferrous sulfate 325 mg daily on 6/3/22. No Fisher panel done on 22 was normal.     She missed follow up with me since 6/3/2020, until 2022. She was found to have severe iron deficiency anemia on 3/25/22. She stopped taking ferrous sulfate since 2020. Dr. Laurie Mcpherson resumed back on ferrous sulfate since 3/25/22. On 2022, she presented for further evaluation of her iron deficiency anemia. She is planned to have cholecystectomy by Dr. Percy Jacques soon. However, it was postponed due to COVID pneumonia. She still has menorrhagia. Her GI work ups on 2020 didn't show any bleeding source. I still believe her iron deficiency anemia is due to chronic blood loss from menorrhagia and I recommend her to continue with ferrous sulfate 650 mg daily. Her anemia and iron status are getting better on 2022 blood test.  Will re evaluate her iron status in 6 months. Since she is going to have cholecystectomy, I rechecked her von Willebrand panel on 22. They were normal.     Menorrhagia - her GYN is considering for ablation. Recommend her to follow up with GYN regularly. She doesn't have any significant new symptoms at today visit. Past Medical History  Significant for   1. Obesity    Surgical History  Significant for  1.  section x 2 ( and )    Allergies  NKDA    Social History  She denies smoking or illicit drug abuse. She socially drinks alcohol. She is currently living in [facility]. Family History  She has family history of type 1 VWD, (4 out of 6 of her children are tested positive). Significant for lung cancer in her maternal grandmother, one maternal aunt and 2 maternal uncles. Her maternal grandfather has stomach cancer. Her paternal grandfather has lung cancer, two of her paternal aunts has breast cancer and one of her paternal aunt has bladder cancer. Oncology History    No history exists.      Review of Systems: \"Per interval history; otherwise 10 point ROS is negative. \"  Her energy level is fair and her sleep is fine. She doesn't have fever, chills, night sweats, cough, shortness of breath, chest pain, hemoptysis or palpitations. Her bowel and bladder functions are normal. She denies nausea, vomiting, abdominal pain, diarrhea, constipation, dysuria, loss of appetite or weight loss. She doesn't have neuropathy and she denies bleeding or clotting issues. She denies any pain on today's visit. No anxiety or depression. The rest of the systems are unremarkable. Vital Signs:  BP (!) 142/63 (Site: Right Upper Arm, Position: Sitting, Cuff Size: Large Adult)   Pulse 90   Temp 97.8 °F (36.6 °C) (Infrared)   Resp 16   Ht 5' 4\" (1.626 m)   Wt 238 lb (108 kg)   SpO2 96%   BMI 40.85 kg/m²     Physical Exam:  CONSTITUTIONAL: awake, alert, cooperative, no apparent distress   EYES: pupils equal, round and reactive to light, sclera clear, normal conjunctiva  ENT: Normocephalic, without obvious abnormality, atraumatic  NECK: supple, symmetrical, no jugular venous distension, no carotid bruits   HEMATOLOGIC/LYMPHATIC: no cervical, supraclavicular or axillary lymphadenopathy   LUNGS: VBS, no wheezes, no increased work of breathing, no rhonchi, clear to auscultation, no crackles,   CARDIOVASCULAR: regular rate and rhythm, normal S1 and S2, no murmur noted  ABDOMEN: normal bowel sounds x 4, soft, non-distended, non-tender, no masses palpated, no hepatosplenomegaly   MUSCULOSKELETAL: full range of motion noted, tone is normal  NEUROLOGIC: awake, alert, oriented to name, place and time. Motor skills grossly intact. SKIN: appears intact, normal skin color, normal texture, normal turgor, no jaundice.    EXTREMITIES: no LE edema, no clubbing, no leg swelling, no cyanosis,       Labs:  Hematology:  Lab Results   Component Value Date    WBC 7.5 09/02/2022    RBC 3.85 (L) 09/02/2022    HGB 11.7 (L) 09/02/2022    HCT 35.7 (L) 09/02/2022    MCV 92.7 09/02/2022    MCH 30.4 09/02/2022    MCHC 32.8 09/02/2022    RDW 12.9 09/02/2022     09/02/2022    MPV 10.4 09/02/2022    SEGSPCT 67.1 (H) 09/02/2022    EOSRELPCT 1.3 09/02/2022    BASOPCT 0.4 09/02/2022    LYMPHOPCT 22.2 (L) 09/02/2022    MONOPCT 9.0 (H) 09/02/2022    SEGSABS 5.0 09/02/2022    EOSABS 0.1 09/02/2022    BASOSABS 0.0 09/02/2022    LYMPHSABS 1.7 09/02/2022    MONOSABS 0.7 09/02/2022    DIFFTYPE AUTOMATED DIFFERENTIAL 09/02/2022    ANISOCYTOSIS 1+ 10/19/2016    POLYCHROM 1+ 10/19/2016     Lab Results   Component Value Date    ESR 8 09/02/2022     Chemistry:  Lab Results   Component Value Date     09/02/2022    K 4.4 09/02/2022     09/02/2022    CO2 24 09/02/2022    BUN 16 09/02/2022    CREATININE 0.8 09/02/2022    GLUCOSE 97 09/02/2022    CALCIUM 9.2 09/02/2022    PROT 6.7 09/02/2022    LABALBU 4.4 09/02/2022    BILITOT 0.3 09/02/2022    ALKPHOS 77 09/02/2022    AST 19 09/02/2022    ALT 27 09/02/2022    LABGLOM >60 09/02/2022    GFRAA >60 09/02/2022    AGRATIO 1.7 03/25/2022    GLOB 2.5 06/25/2021     Lab Results   Component Value Date     09/02/2022     No components found for: LD  Lab Results   Component Value Date    TSHHS 1.070 09/02/2022    FT3 3.6 04/08/2013     Immunology:  Lab Results   Component Value Date    PROT 6.7 09/02/2022     No results found for: Lara Erp, KLFLCR  No results found for: B2M  Coagulation Panel:  Lab Results   Component Value Date    PROTIME 11.7 06/03/2022    INR 1.0 06/03/2022    APTT 25.9 05/24/2014    DDIMER 557 (H) 06/17/2016     Anemia Panel:  Lab Results   Component Value Date    NOZSMYVI35 714.1 09/02/2022    FOLATE >20.0 (H) 09/02/2022     Tumor Markers:  No results found for: , CEA, , LABCA2, PSA  Observations:  No data recorded     Assessment:  Iron deficiency anemia    Plan:   Ms. Kim Hernandez is a 42-year-old very pleasant female who was found to have anemia, when she presented with GERD symptoms. GI work ups didn't reveal any source of bleeding. She has menorrhagia and family history of von Willebrand disease. Anemia work ups on 5/11/2020 was consistent with iron deficiency anemia. I believe her iron deficiency anemia is most likely due to chronic blood loss from menorrhagia. I started ferrous sulfate 325 mg daily on 6/3/22. Radha Romeo panel done on 7/22/22 was normal.     She missed follow up with me since 6/3/2020, until 5/9/2022. She was found to have severe iron deficiency anemia on 3/25/22. She stopped taking ferrous sulfate since 12/2020. Dr. Zara Castaneda resumed back on ferrous sulfate since 3/25/22. On October 6, 2022, she presented for further evaluation of her iron deficiency anemia. She is planned to have cholecystectomy by Dr. Verónica Mills soon. However, it was postponed due to COVID pneumonia. She still has menorrhagia. Her GI work ups on 2/2020 didn't show any bleeding source. I still believe her iron deficiency anemia is due to chronic blood loss from menorrhagia and I recommend her to continue with ferrous sulfate 650 mg daily. Her anemia and iron status are getting better on 9/2/2022 blood test.  Will re evaluate her iron status in 6 months. Since she is going to have cholecystectomy, I rechecked her von Willebrand panel on 5/9/22. They were normal.     Menorrhagia - her GYN is considering for ablation. Recommend her to follow up with GYN regularly. I answered all her questions and concerns for today. Recent imaging and labs were reviewed and discussed with the patient.

## 2022-10-06 ENCOUNTER — OFFICE VISIT (OUTPATIENT)
Dept: ONCOLOGY | Age: 42
End: 2022-10-06
Payer: COMMERCIAL

## 2022-10-06 ENCOUNTER — HOSPITAL ENCOUNTER (OUTPATIENT)
Dept: INFUSION THERAPY | Age: 42
Discharge: HOME | End: 2022-10-06
Payer: COMMERCIAL

## 2022-10-06 VITALS
HEART RATE: 90 BPM | OXYGEN SATURATION: 96 % | HEIGHT: 64 IN | WEIGHT: 238 LBS | BODY MASS INDEX: 40.63 KG/M2 | RESPIRATION RATE: 16 BRPM | TEMPERATURE: 97.8 F | DIASTOLIC BLOOD PRESSURE: 63 MMHG | SYSTOLIC BLOOD PRESSURE: 142 MMHG

## 2022-10-06 DIAGNOSIS — D50.0 IRON DEFICIENCY ANEMIA SECONDARY TO BLOOD LOSS (CHRONIC): Primary | ICD-10-CM

## 2022-10-06 PROCEDURE — 99213 OFFICE O/P EST LOW 20 MIN: CPT | Performed by: INTERNAL MEDICINE

## 2022-10-06 PROCEDURE — G8484 FLU IMMUNIZE NO ADMIN: HCPCS | Performed by: INTERNAL MEDICINE

## 2022-10-06 PROCEDURE — G8427 DOCREV CUR MEDS BY ELIG CLIN: HCPCS | Performed by: INTERNAL MEDICINE

## 2022-10-06 PROCEDURE — 99211 OFF/OP EST MAY X REQ PHY/QHP: CPT

## 2022-10-06 PROCEDURE — 1036F TOBACCO NON-USER: CPT | Performed by: INTERNAL MEDICINE

## 2022-10-06 PROCEDURE — G8417 CALC BMI ABV UP PARAM F/U: HCPCS | Performed by: INTERNAL MEDICINE

## 2022-10-06 NOTE — PROGRESS NOTES
MA Rooming Questions  Patient: Sunita Hahn  MRN: [id number]    Date: 10/6/2022        1. Do you have any new issues?   no         2. Do you need any refills on medications?    no    3. Have you had any imaging done since your last visit?   no    4. Have you been hospitalized or seen in the emergency room since your last visit here?   no    5. Did the patient have a depression screening completed today?  No    No data recorded     PHQ-9 Given to (if applicable):               PHQ-9 Score (if applicable):                     [] Positive     []  Negative              Does question #9 need addressed (if applicable)                     [] Yes    []  No               Jesse Lemus MA

## 2022-10-31 DIAGNOSIS — F90.2 ATTENTION DEFICIT HYPERACTIVITY DISORDER (ADHD), COMBINED TYPE: ICD-10-CM

## 2022-11-01 RX ORDER — DEXTROAMPHETAMINE SACCHARATE, AMPHETAMINE ASPARTATE, DEXTROAMPHETAMINE SULFATE AND AMPHETAMINE SULFATE 1.25; 1.25; 1.25; 1.25 MG/1; MG/1; MG/1; MG/1
5 TABLET ORAL 2 TIMES DAILY
Qty: 60 TABLET | Refills: 0 | Status: SHIPPED | OUTPATIENT
Start: 2022-11-01 | End: 2022-11-03 | Stop reason: SDUPTHER

## 2022-11-02 ENCOUNTER — OFFICE VISIT (OUTPATIENT)
Dept: FAMILY MEDICINE CLINIC | Age: 42
End: 2022-11-02
Payer: COMMERCIAL

## 2022-11-02 VITALS
HEIGHT: 64 IN | SYSTOLIC BLOOD PRESSURE: 134 MMHG | OXYGEN SATURATION: 98 % | WEIGHT: 240.6 LBS | HEART RATE: 69 BPM | BODY MASS INDEX: 41.07 KG/M2 | DIASTOLIC BLOOD PRESSURE: 82 MMHG

## 2022-11-02 DIAGNOSIS — Z51.81 MEDICATION MONITORING ENCOUNTER: ICD-10-CM

## 2022-11-02 DIAGNOSIS — S46.212A STRAIN OF LEFT BICEPS TENDON: ICD-10-CM

## 2022-11-02 DIAGNOSIS — F90.2 ATTENTION DEFICIT HYPERACTIVITY DISORDER (ADHD), COMBINED TYPE: ICD-10-CM

## 2022-11-02 DIAGNOSIS — F90.2 ATTENTION DEFICIT HYPERACTIVITY DISORDER (ADHD), COMBINED TYPE: Primary | ICD-10-CM

## 2022-11-02 DIAGNOSIS — M77.12 LATERAL EPICONDYLITIS OF LEFT ELBOW: ICD-10-CM

## 2022-11-02 PROBLEM — M77.10 LATERAL EPICONDYLITIS: Status: ACTIVE | Noted: 2022-11-02

## 2022-11-02 PROCEDURE — 99214 OFFICE O/P EST MOD 30 MIN: CPT | Performed by: FAMILY MEDICINE

## 2022-11-02 PROCEDURE — G8484 FLU IMMUNIZE NO ADMIN: HCPCS | Performed by: FAMILY MEDICINE

## 2022-11-02 PROCEDURE — G8427 DOCREV CUR MEDS BY ELIG CLIN: HCPCS | Performed by: FAMILY MEDICINE

## 2022-11-02 PROCEDURE — 1036F TOBACCO NON-USER: CPT | Performed by: FAMILY MEDICINE

## 2022-11-02 PROCEDURE — G8417 CALC BMI ABV UP PARAM F/U: HCPCS | Performed by: FAMILY MEDICINE

## 2022-11-02 SDOH — ECONOMIC STABILITY: FOOD INSECURITY: WITHIN THE PAST 12 MONTHS, YOU WORRIED THAT YOUR FOOD WOULD RUN OUT BEFORE YOU GOT MONEY TO BUY MORE.: PATIENT DECLINED

## 2022-11-02 SDOH — ECONOMIC STABILITY: FOOD INSECURITY: WITHIN THE PAST 12 MONTHS, THE FOOD YOU BOUGHT JUST DIDN'T LAST AND YOU DIDN'T HAVE MONEY TO GET MORE.: PATIENT DECLINED

## 2022-11-02 ASSESSMENT — SOCIAL DETERMINANTS OF HEALTH (SDOH): HOW HARD IS IT FOR YOU TO PAY FOR THE VERY BASICS LIKE FOOD, HOUSING, MEDICAL CARE, AND HEATING?: PATIENT DECLINED

## 2022-11-02 NOTE — PROGRESS NOTES
Previsit chart review: Patient continues with pulmonology for hemoptysis, their last note from September was reviewed. Mopped assist was resolved but she is concerned about certain chemicals at her workplace being a trigger, she is referred to occupational health doctor. She continues with hematology for anemia, that note also reviewed. He was treated for iron deficiency and GI work-up done previously was negative so they are concerned that she needs treatment for menorrhagia and I can also see notes from her GYN.

## 2022-11-02 NOTE — PROGRESS NOTES
Chief Complaint   Patient presents with    Arm Pain     Left arm pain X 3 weeks- constant pain lifting things make pain worse    ADHD     Requesting adhd checkup as well       [place] NARRATIVE:    Left arm pain from shoulder down to and below elbow (LHD) no injury ongoing for 2-3 weeks. No injury or overuse. She cleans offices for a living. Vacuuming and other lifting hurts    UDS and treatment agreement for long-term ADHD medication were done in May 2022. Patient is established unless otherwise noted. Above chief complaint and [place] obtained by this physician provider. Review of Systems   Constitutional:  Negative for activity change, chills, fatigue and fever. HENT:  Negative for congestion. Respiratory:  Negative for cough, chest tightness, shortness of breath and wheezing. Cardiovascular:  Negative for chest pain and leg swelling. Gastrointestinal:  Negative for abdominal pain. Musculoskeletal:  Positive for arthralgias. Negative for back pain and myalgias. Skin:  Negative for rash. Psychiatric/Behavioral:  Positive for decreased concentration. Negative for behavioral problems and dysphoric mood. Allergies   Allergen Reactions    Buspar [Buspirone]     Zoloft [Sertraline Hcl]      Allergy historyupdated. Outpatient Medications Marked as Taking for the 11/2/22 encounter (Office Visit) with Franco Peña MD   Medication Sig Dispense Refill    [DISCONTINUED] amphetamine-dextroamphetamine (ADDERALL, 5MG,) 5 MG tablet Take 1 tablet by mouth 2 times daily for 30 days.  (6-8 hours apart) 60 tablet 0    tiZANidine (ZANAFLEX) 4 MG tablet TAKE 1 TABLET BY MOUTH EVERY DAY AT NIGHT 30 tablet 5    ondansetron (ZOFRAN ODT) 4 MG disintegrating tablet Take 1 tablet by mouth every 8 hours as needed for Nausea or Vomiting 30 tablet 5    ferrous sulfate (IRON 325) 325 (65 Fe) MG tablet Take 1 tablet by mouth 2 times daily 180 tablet 2    acetaminophen (TYLENOL) 500 MG tablet Take 2 tablets by mouth famotidine (PEPCID) 40 MG tablet Take 1 tablet by mouth every evening 30 tablet 5    valACYclovir (VALTREX) 1 g tablet Take 1 g by mouth daily      Multiple Vitamins-Minerals (MULTI COMPLETE PO) Take by mouth         Tobacco use history updated. Social History     Tobacco Use   Smoking Status Former    Types: Cigarettes    Quit date: 1998    Years since quittin.3   Smokeless Tobacco Never        Nursing note reviewed. Vitals:    22 1053   BP: 134/82   Pulse: 69   SpO2: 98%   Weight: 240 lb 9.6 oz (109.1 kg)   Height: 5' 4\" (1.626 m)          BP Readings from Last 3 Encounters:   22 134/82   10/06/22 (!) 142/63   22 124/82     Wt Readings from Last 3 Encounters:   22 240 lb 9.6 oz (109.1 kg)   10/06/22 238 lb (108 kg)   22 251 lb (113.9 kg)     Body mass index is 41.3 kg/m². No results found for this visit on 22. Physical Exam  Vitals and nursing note reviewed. Constitutional:       General: She is not in acute distress. Appearance: She is well-developed. She is not diaphoretic. HENT:      Head: Normocephalic. Eyes:      General:         Right eye: No discharge. Left eye: No discharge. Conjunctiva/sclera: Conjunctivae normal.      Pupils: Pupils are equal, round, and reactive to light. Cardiovascular:      Rate and Rhythm: Normal rate and regular rhythm. Heart sounds: Normal heart sounds. No murmur heard. Pulmonary:      Effort: Pulmonary effort is normal. No respiratory distress. Breath sounds: Normal breath sounds. No wheezing or rales. Musculoskeletal:         General: Tenderness (To left elbow with range of motion and resisted wrist extension) present. Cervical back: Normal range of motion. Skin:     General: Skin is warm and dry. Neurological:      Mental Status: She is alert and oriented to person, place, and time.    Psychiatric:         Behavior: Behavior normal. _________________________________________________  Assessment:     1. Attention deficit hyperactivity disorder (ADHD), combined type  2. Strain of left biceps tendon  3. Lateral epicondylitis of left elbow  4. Medication monitoring encounter    Rest, over-the-counter anti-inflammatories and multiple handouts provided for biceps tendinitis and tennis elbow. She declined referral for PT.  ADHD seems controlled on current medication she can call for refills when needed. Problems listed above are stable and therapeutic plan is unchanged unless otherwise specified. See orders above and comments below for details of workup or medication orders. _________________________________________________  Plan:     Return if symptoms worsen or fail to improve.       Electronically signed by Arnoldo Paul MD on 11/2/22 at 11:05 AM LENO

## 2022-11-02 NOTE — TELEPHONE ENCOUNTER
Pt called to report that all [facility] have adderall on back order pt would like to try [facility]

## 2022-11-03 RX ORDER — DEXTROAMPHETAMINE SACCHARATE, AMPHETAMINE ASPARTATE, DEXTROAMPHETAMINE SULFATE AND AMPHETAMINE SULFATE 1.25; 1.25; 1.25; 1.25 MG/1; MG/1; MG/1; MG/1
5 TABLET ORAL 2 TIMES DAILY
Qty: 60 TABLET | Refills: 0 | Status: SHIPPED | OUTPATIENT
Start: 2022-11-03 | End: 2022-11-29 | Stop reason: SDUPTHER

## 2022-11-26 ASSESSMENT — ENCOUNTER SYMPTOMS
SHORTNESS OF BREATH: 0
CHEST TIGHTNESS: 0
ABDOMINAL PAIN: 0
BACK PAIN: 0
COUGH: 0
WHEEZING: 0

## 2022-11-28 DIAGNOSIS — F90.2 ATTENTION DEFICIT HYPERACTIVITY DISORDER (ADHD), COMBINED TYPE: ICD-10-CM

## 2022-11-29 RX ORDER — DEXTROAMPHETAMINE SACCHARATE, AMPHETAMINE ASPARTATE, DEXTROAMPHETAMINE SULFATE AND AMPHETAMINE SULFATE 1.25; 1.25; 1.25; 1.25 MG/1; MG/1; MG/1; MG/1
5 TABLET ORAL 2 TIMES DAILY
Qty: 60 TABLET | Refills: 0 | Status: SHIPPED | OUTPATIENT
Start: 2022-11-29 | End: 2022-12-29

## 2022-12-03 ENCOUNTER — PATIENT MESSAGE (OUTPATIENT)
Dept: FAMILY MEDICINE CLINIC | Age: 42
End: 2022-12-03

## 2022-12-05 RX ORDER — OSELTAMIVIR PHOSPHATE 75 MG/1
75 CAPSULE ORAL 2 TIMES DAILY
Qty: 10 CAPSULE | Refills: 0 | Status: SHIPPED | OUTPATIENT
Start: 2022-12-05 | End: 2022-12-10

## 2022-12-05 NOTE — TELEPHONE ENCOUNTER
From: Toni Ríos  To: Dr. Dumont Risk: 12/3/2022 12:16 PM EST  Subject: Influenza     Hi Dr. Villegas Showers, my son is positive for influenza A, now Tamásipuszta and I are having some symptoms and was hoping you could call us in some Tamiflu.  Thank you

## 2022-12-09 ENCOUNTER — PATIENT MESSAGE (OUTPATIENT)
Dept: FAMILY MEDICINE CLINIC | Age: 42
End: 2022-12-09

## 2022-12-09 RX ORDER — CEFUROXIME AXETIL 250 MG/1
250 TABLET ORAL 2 TIMES DAILY
Qty: 14 TABLET | Refills: 0 | Status: SHIPPED | OUTPATIENT
Start: 2022-12-09 | End: 2022-12-16

## 2022-12-09 NOTE — TELEPHONE ENCOUNTER
From: Madi Glover  To: Dr. Barbara Marrero: 12/9/2022 9:04 AM EST  Subject: Influenza followup     Hi Dr. Chino Salas, this flu has been the worse sickness I have ever had. I am really struggling to get better. I feel really out of breath and my chest hurts when I cough. I am kind of starting to feel like I did when I had pneumonia. So I was hoping you could also call me in some antibiotics and maybe some steroid so I don't have to go in anywhere and expose people to this.

## 2022-12-09 NOTE — TELEPHONE ENCOUNTER
Note reviewed. She still has colored brown and green sputum. No fever. I will send an antibiotic, Ceftin, to her pharmacy. She may use the inhaler that she used the last time she had COVID to see if it reduces her coughing fits. [employer] message will be sent.

## 2022-12-28 DIAGNOSIS — F90.2 ATTENTION DEFICIT HYPERACTIVITY DISORDER (ADHD), COMBINED TYPE: ICD-10-CM

## 2022-12-28 RX ORDER — DEXTROAMPHETAMINE SACCHARATE, AMPHETAMINE ASPARTATE, DEXTROAMPHETAMINE SULFATE AND AMPHETAMINE SULFATE 1.25; 1.25; 1.25; 1.25 MG/1; MG/1; MG/1; MG/1
5 TABLET ORAL 2 TIMES DAILY
Qty: 60 TABLET | Refills: 0 | Status: SHIPPED | OUTPATIENT
Start: 2022-12-28 | End: 2023-01-27

## 2023-02-03 ENCOUNTER — OFFICE VISIT (OUTPATIENT)
Dept: FAMILY MEDICINE CLINIC | Age: 43
End: 2023-02-03
Payer: COMMERCIAL

## 2023-02-03 VITALS
WEIGHT: 242 LBS | OXYGEN SATURATION: 95 % | BODY MASS INDEX: 41.54 KG/M2 | DIASTOLIC BLOOD PRESSURE: 88 MMHG | SYSTOLIC BLOOD PRESSURE: 124 MMHG | HEART RATE: 95 BPM | TEMPERATURE: 97.8 F

## 2023-02-03 DIAGNOSIS — B37.31 VAGINAL YEAST INFECTION: ICD-10-CM

## 2023-02-03 DIAGNOSIS — F90.2 ATTENTION DEFICIT HYPERACTIVITY DISORDER (ADHD), COMBINED TYPE: ICD-10-CM

## 2023-02-03 DIAGNOSIS — F90.2 ATTENTION DEFICIT HYPERACTIVITY DISORDER (ADHD), COMBINED TYPE: Primary | ICD-10-CM

## 2023-02-03 DIAGNOSIS — Z20.818 EXPOSURE TO STREPTOCOCCAL PHARYNGITIS: ICD-10-CM

## 2023-02-03 DIAGNOSIS — J02.9 SORE THROAT: ICD-10-CM

## 2023-02-03 PROCEDURE — 99214 OFFICE O/P EST MOD 30 MIN: CPT | Performed by: FAMILY MEDICINE

## 2023-02-03 RX ORDER — FLUCONAZOLE 150 MG/1
150 TABLET ORAL ONCE
Qty: 2 TABLET | Refills: 0 | Status: SHIPPED | OUTPATIENT
Start: 2023-02-03 | End: 2023-02-03

## 2023-02-03 RX ORDER — PENICILLIN V POTASSIUM 500 MG/1
500 TABLET ORAL 4 TIMES DAILY
Qty: 40 TABLET | Refills: 0 | Status: SHIPPED | OUTPATIENT
Start: 2023-02-03 | End: 2023-02-13

## 2023-02-03 RX ORDER — DEXTROAMPHETAMINE SACCHARATE, AMPHETAMINE ASPARTATE, DEXTROAMPHETAMINE SULFATE AND AMPHETAMINE SULFATE 1.875; 1.875; 1.875; 1.875 MG/1; MG/1; MG/1; MG/1
7.5 TABLET ORAL 2 TIMES DAILY
Qty: 60 TABLET | Refills: 0 | Status: SHIPPED | OUTPATIENT
Start: 2023-02-03 | End: 2023-03-05

## 2023-02-03 RX ORDER — DEXTROAMPHETAMINE SACCHARATE, AMPHETAMINE ASPARTATE, DEXTROAMPHETAMINE SULFATE AND AMPHETAMINE SULFATE 1.25; 1.25; 1.25; 1.25 MG/1; MG/1; MG/1; MG/1
5 TABLET ORAL 2 TIMES DAILY
Qty: 60 TABLET | Refills: 0 | Status: SHIPPED | OUTPATIENT
Start: 2023-02-03 | End: 2023-02-03

## 2023-02-03 NOTE — PROGRESS NOTES
Chief Complaint   Patient presents with    Discuss Medications     Pt thinks adderall needs increased as 5 mg not helping her anymore     Pharyngitis     Pt woke up with sore throat, pt son is currently positive with strep in the house        [place] NARRATIVE:    Son diagnosed twice with strep in the last month. She states some \"versions of amoxicillin\" do not work for her or her children. Regarding ADHD she continues to have lots of stress on her time and person. We had started her at a small dose which she felt was helpful. I am okay increasing the dose but there is a limit to how much medication can do if she is overstressed overwhelmed and over scheduled. She is seeing hematologist for iron deficiency anemia and GI symptoms. Patient is established unless otherwise noted. Above chief complaint and [place] obtained by this physician provider. Review of Systems   Constitutional:  Negative for activity change, chills, fatigue and fever. HENT:  Positive for sore throat. Negative for congestion. Respiratory:  Negative for cough, chest tightness, shortness of breath and wheezing. Cardiovascular:  Negative for chest pain and leg swelling. Gastrointestinal:  Negative for abdominal pain. Musculoskeletal:  Negative for back pain and myalgias. Skin:  Negative for rash. Psychiatric/Behavioral:  Positive for decreased concentration. Negative for behavioral problems and dysphoric mood. Allergies   Allergen Reactions    Buspar [Buspirone]     Zoloft [Sertraline Hcl]      Allergy historyupdated. Outpatient Medications Marked as Taking for the 2/3/23 encounter (Office Visit) with Adonis Young MD   Medication Sig Dispense Refill    [] fluconazole (DIFLUCAN) 150 MG tablet Take 1 tablet by mouth once for 1 dose May repeat in 5 days, if symptoms persist or recur.  2 tablet 0    [] penicillin v potassium (VEETID) 500 MG tablet Take 1 tablet by mouth 4 times daily for 10 days 40 tablet 0    amphetamine-dextroamphetamine (ADDERALL) 7.5 MG tablet Take 1 tablet by mouth 2 times daily for 30 days. Max Daily Amount: 15 mg 60 tablet 0    tiZANidine (ZANAFLEX) 4 MG tablet TAKE 1 TABLET BY MOUTH EVERY DAY AT NIGHT 30 tablet 5    ondansetron (ZOFRAN ODT) 4 MG disintegrating tablet Take 1 tablet by mouth every 8 hours as needed for Nausea or Vomiting 30 tablet 5    ferrous sulfate (IRON 325) 325 (65 Fe) MG tablet Take 1 tablet by mouth 2 times daily 180 tablet 2    acetaminophen (TYLENOL) 500 MG tablet Take 2 tablets by mouth      famotidine (PEPCID) 40 MG tablet Take 1 tablet by mouth every evening 30 tablet 5    valACYclovir (VALTREX) 1 g tablet Take 1 g by mouth daily      Multiple Vitamins-Minerals (MULTI COMPLETE PO) Take by mouth         Tobacco use history updated. Social History     Tobacco Use   Smoking Status Former    Types: Cigarettes    Quit date: 1998    Years since quittin.5   Smokeless Tobacco Never        Nursing note reviewed. Vitals:    23 1106   BP: 124/88   Site: Left Upper Arm   Position: Sitting   Cuff Size: Medium Adult   Pulse: 95   Temp: 97.8 °F (36.6 °C)   TempSrc: Oral   SpO2: 95%   Weight: 242 lb (109.8 kg)          BP Readings from Last 3 Encounters:   23 124/88   22 134/82   10/06/22 (!) 142/63     Wt Readings from Last 3 Encounters:   23 242 lb (109.8 kg)   22 240 lb 9.6 oz (109.1 kg)   10/06/22 238 lb (108 kg)     Body mass index is 41.54 kg/m². No results found for this visit on 23. Physical Exam  Vitals and nursing note reviewed. Constitutional:       General: She is not in acute distress. Appearance: Normal appearance. She is well-developed. She is not diaphoretic. HENT:      Head: Normocephalic and atraumatic. Nose: Nose normal.   Eyes:      Conjunctiva/sclera: Conjunctivae normal.      Pupils: Pupils are equal, round, and reactive to light.    Cardiovascular:      Rate and Rhythm: Normal rate and regular rhythm. Heart sounds: Normal heart sounds. No murmur heard. Pulmonary:      Effort: Pulmonary effort is normal. No respiratory distress. Breath sounds: Normal breath sounds. No wheezing. Musculoskeletal:         General: Deformity present. Normal range of motion. Skin:     General: Skin is warm and dry. Neurological:      Mental Status: She is alert and oriented to person, place, and time. _________________________________________________  Assessment:     1. Attention deficit hyperactivity disorder (ADHD), combined type  -     amphetamine-dextroamphetamine (ADDERALL) 7.5 MG tablet; Take 1 tablet by mouth 2 times daily for 30 days. Max Daily Amount: 15 mg, Disp-60 tablet, R-0Normal  2. Sore throat  3. Exposure to Streptococcal pharyngitis  -     penicillin v potassium (VEETID) 500 MG tablet; Take 1 tablet by mouth 4 times daily for 10 days, Disp-40 tablet, R-0Normal  4. Vaginal yeast infection  -     fluconazole (DIFLUCAN) 150 MG tablet; Take 1 tablet by mouth once for 1 dose May repeat in 5 days, if symptoms persist or recur., Disp-2 tablet, R-0Normal    I sent increased dose of Adderall as above. Penicillin V potassium was prescribed for her exposure to strep and current sore throat symptom. No testing was done. Diflucan was given for use after the antibiotic if has yeast vaginitis symptoms recurrent. Problems listed above are stable and therapeutic plan is unchanged unless otherwise specified. See orders above and comments below for details of workup or medication orders. _________________________________________________  Plan:     Return if symptoms worsen or fail to improve.       Electronically signed by Wendy Self MD on 2/3/23 at 11:16 AM EST

## 2023-02-19 ASSESSMENT — ENCOUNTER SYMPTOMS
CHEST TIGHTNESS: 0
SORE THROAT: 1
ABDOMINAL PAIN: 0
BACK PAIN: 0
SHORTNESS OF BREATH: 0
WHEEZING: 0
COUGH: 0

## 2023-03-06 DIAGNOSIS — F90.2 ATTENTION DEFICIT HYPERACTIVITY DISORDER (ADHD), COMBINED TYPE: ICD-10-CM

## 2023-03-06 RX ORDER — DEXTROAMPHETAMINE SACCHARATE, AMPHETAMINE ASPARTATE, DEXTROAMPHETAMINE SULFATE AND AMPHETAMINE SULFATE 1.875; 1.875; 1.875; 1.875 MG/1; MG/1; MG/1; MG/1
7.5 TABLET ORAL 2 TIMES DAILY
Qty: 60 TABLET | Refills: 0 | Status: SHIPPED | OUTPATIENT
Start: 2023-03-06 | End: 2023-04-05

## 2023-03-13 RX ORDER — PANTOPRAZOLE SODIUM 40 MG/1
40 TABLET, DELAYED RELEASE ORAL 2 TIMES DAILY
Qty: 60 TABLET | Refills: 5 | Status: SHIPPED | OUTPATIENT
Start: 2023-03-13 | End: 2023-09-09

## 2023-04-04 ENCOUNTER — PATIENT MESSAGE (OUTPATIENT)
Dept: FAMILY MEDICINE CLINIC | Age: 43
End: 2023-04-04

## 2023-04-04 DIAGNOSIS — U07.1 COVID-19: Primary | ICD-10-CM

## 2023-04-05 ENCOUNTER — TELEPHONE (OUTPATIENT)
Dept: FAMILY MEDICINE CLINIC | Age: 43
End: 2023-04-05

## 2023-04-05 NOTE — TELEPHONE ENCOUNTER
Request noted tonight will be 4 days since onset of symptoms, we will send Paxlovid per her request.  She is unvaccinated. She has had COVID before in June 2022. Medication sent to [facility] on [address]. SUpportive/symptiomatic therapy also.

## 2023-04-05 NOTE — TELEPHONE ENCOUNTER
From: Vy Van  To: Dr. Yudi Del Castillo: 4/4/2023 4:15 PM EDT  Subject: Kellie Newton, I tested positive for covid today, the last time I had it you prescribed me a medicine and I was hoping you can give me that again. I feel like this time is so much worse then the last. My symptoms started sundayeve.

## 2023-04-05 NOTE — TELEPHONE ENCOUNTER
Pt called today. She tested positive for Covid yesterday. Onset of symptoms was Sunday. She has a fever, sore throat, out of breath, coughing, body aches and her lungs hurt.  She asked for a Telehealth but is ok with something called into her pharmacy too which is [facility] on [address] please advise

## 2023-04-17 DIAGNOSIS — F90.2 ATTENTION DEFICIT HYPERACTIVITY DISORDER (ADHD), COMBINED TYPE: ICD-10-CM

## 2023-04-17 RX ORDER — DEXTROAMPHETAMINE SACCHARATE, AMPHETAMINE ASPARTATE, DEXTROAMPHETAMINE SULFATE AND AMPHETAMINE SULFATE 1.875; 1.875; 1.875; 1.875 MG/1; MG/1; MG/1; MG/1
7.5 TABLET ORAL 2 TIMES DAILY
Qty: 60 TABLET | Refills: 0 | Status: SHIPPED | OUTPATIENT
Start: 2023-04-17 | End: 2023-05-17

## 2023-05-15 DIAGNOSIS — F90.2 ATTENTION DEFICIT HYPERACTIVITY DISORDER (ADHD), COMBINED TYPE: ICD-10-CM

## 2023-05-15 DIAGNOSIS — R11.0 NAUSEA: ICD-10-CM

## 2023-05-16 RX ORDER — DEXTROAMPHETAMINE SACCHARATE, AMPHETAMINE ASPARTATE, DEXTROAMPHETAMINE SULFATE AND AMPHETAMINE SULFATE 1.875; 1.875; 1.875; 1.875 MG/1; MG/1; MG/1; MG/1
7.5 TABLET ORAL 2 TIMES DAILY
Qty: 60 TABLET | Refills: 0 | Status: SHIPPED | OUTPATIENT
Start: 2023-05-16 | End: 2023-06-15

## 2023-05-16 RX ORDER — ONDANSETRON 4 MG/1
4 TABLET, ORALLY DISINTEGRATING ORAL EVERY 8 HOURS PRN
Qty: 30 TABLET | Refills: 5 | Status: SHIPPED | OUTPATIENT
Start: 2023-05-16

## 2023-05-17 DIAGNOSIS — M79.7 FIBROMYALGIA: ICD-10-CM

## 2023-05-17 RX ORDER — TIZANIDINE 4 MG/1
TABLET ORAL
Qty: 30 TABLET | Refills: 5 | Status: SHIPPED | OUTPATIENT
Start: 2023-05-17

## 2023-06-06 NOTE — TELEPHONE ENCOUNTER
Was sent as requested.   She should follow-up within a month or 2 with us NARx was reviewed Take over the counter pain medication

## 2023-06-30 ENCOUNTER — APPOINTMENT (OUTPATIENT)
Dept: ULTRASOUND IMAGING | Age: 43
End: 2023-06-30
Payer: COMMERCIAL

## 2023-06-30 ENCOUNTER — HOSPITAL ENCOUNTER (EMERGENCY)
Age: 43
Discharge: HOME | End: 2023-06-30
Attending: EMERGENCY MEDICINE
Payer: COMMERCIAL

## 2023-06-30 VITALS
HEART RATE: 81 BPM | OXYGEN SATURATION: 97 % | TEMPERATURE: 98.4 F | BODY MASS INDEX: 43.94 KG/M2 | RESPIRATION RATE: 16 BRPM | HEIGHT: 63 IN | DIASTOLIC BLOOD PRESSURE: 91 MMHG | WEIGHT: 248 LBS | SYSTOLIC BLOOD PRESSURE: 129 MMHG

## 2023-06-30 DIAGNOSIS — M79.662 PAIN OF LEFT CALF: Primary | ICD-10-CM

## 2023-06-30 LAB
ALBUMIN SERPL-MCNC: 4.3 GM/DL (ref 3.4–5)
ALP BLD-CCNC: 89 IU/L (ref 40–129)
ALT SERPL-CCNC: 34 U/L (ref 10–40)
ANION GAP SERPL CALCULATED.3IONS-SCNC: 8 MMOL/L (ref 4–16)
AST SERPL-CCNC: 30 IU/L (ref 15–37)
BASOPHILS ABSOLUTE: 0 K/CU MM
BASOPHILS RELATIVE PERCENT: 0.6 % (ref 0–1)
BILIRUB SERPL-MCNC: 0.3 MG/DL (ref 0–1)
BUN SERPL-MCNC: 13 MG/DL (ref 6–23)
CALCIUM SERPL-MCNC: 9.5 MG/DL (ref 8.3–10.6)
CHLORIDE BLD-SCNC: 102 MMOL/L (ref 99–110)
CO2: 25 MMOL/L (ref 21–32)
CREAT SERPL-MCNC: 0.7 MG/DL (ref 0.6–1.1)
DIFFERENTIAL TYPE: ABNORMAL
EOSINOPHILS ABSOLUTE: 0.1 K/CU MM
EOSINOPHILS RELATIVE PERCENT: 0.9 % (ref 0–3)
GFR SERPL CREATININE-BSD FRML MDRD: >60 ML/MIN/1.73M2
GLUCOSE SERPL-MCNC: 105 MG/DL (ref 70–99)
HCT VFR BLD CALC: 33.7 % (ref 37–47)
HEMOGLOBIN: 10.5 GM/DL (ref 12.5–16)
IMMATURE NEUTROPHIL %: 0.4 % (ref 0–0.43)
LYMPHOCYTES ABSOLUTE: 1.6 K/CU MM
LYMPHOCYTES RELATIVE PERCENT: 24.1 % (ref 24–44)
MCH RBC QN AUTO: 27.5 PG (ref 27–31)
MCHC RBC AUTO-ENTMCNC: 31.2 % (ref 32–36)
MCV RBC AUTO: 88.2 FL (ref 78–100)
MONOCYTES ABSOLUTE: 0.7 K/CU MM
MONOCYTES RELATIVE PERCENT: 9.9 % (ref 0–4)
PDW BLD-RTO: 13.6 % (ref 11.7–14.9)
PLATELET # BLD: 380 K/CU MM (ref 140–440)
PMV BLD AUTO: 10.3 FL (ref 7.5–11.1)
POTASSIUM SERPL-SCNC: 4.1 MMOL/L (ref 3.5–5.1)
RBC # BLD: 3.82 M/CU MM (ref 4.2–5.4)
SEGMENTED NEUTROPHILS ABSOLUTE COUNT: 4.3 K/CU MM
SEGMENTED NEUTROPHILS RELATIVE PERCENT: 64.1 % (ref 36–66)
SODIUM BLD-SCNC: 135 MMOL/L (ref 135–145)
TOTAL IMMATURE NEUTOROPHIL: 0.03 K/CU MM
TOTAL PROTEIN: 7.4 GM/DL (ref 6.4–8.2)
WBC # BLD: 6.8 K/CU MM (ref 4–10.5)

## 2023-06-30 PROCEDURE — 99284 EMERGENCY DEPT VISIT MOD MDM: CPT

## 2023-06-30 PROCEDURE — 80053 COMPREHEN METABOLIC PANEL: CPT

## 2023-06-30 PROCEDURE — 85025 COMPLETE CBC W/AUTO DIFF WBC: CPT

## 2023-06-30 PROCEDURE — 93971 EXTREMITY STUDY: CPT

## 2023-06-30 ASSESSMENT — PAIN - FUNCTIONAL ASSESSMENT
PAIN_FUNCTIONAL_ASSESSMENT: 0-10
PAIN_FUNCTIONAL_ASSESSMENT: PREVENTS OR INTERFERES SOME ACTIVE ACTIVITIES AND ADLS

## 2023-06-30 ASSESSMENT — PAIN DESCRIPTION - LOCATION: LOCATION: LEG

## 2023-06-30 ASSESSMENT — PAIN DESCRIPTION - PAIN TYPE: TYPE: ACUTE PAIN

## 2023-06-30 ASSESSMENT — PAIN SCALES - GENERAL: PAINLEVEL_OUTOF10: 0

## 2023-06-30 ASSESSMENT — PAIN DESCRIPTION - FREQUENCY: FREQUENCY: CONTINUOUS

## 2023-06-30 ASSESSMENT — PAIN DESCRIPTION - ORIENTATION: ORIENTATION: LEFT;LOWER;POSTERIOR

## 2023-07-18 DIAGNOSIS — F90.2 ATTENTION DEFICIT HYPERACTIVITY DISORDER (ADHD), COMBINED TYPE: ICD-10-CM

## 2023-07-18 RX ORDER — DEXTROAMPHETAMINE SACCHARATE, AMPHETAMINE ASPARTATE, DEXTROAMPHETAMINE SULFATE AND AMPHETAMINE SULFATE 1.875; 1.875; 1.875; 1.875 MG/1; MG/1; MG/1; MG/1
7.5 TABLET ORAL 2 TIMES DAILY
Qty: 60 TABLET | Refills: 0 | Status: SHIPPED | OUTPATIENT
Start: 2023-07-18 | End: 2023-08-17

## 2023-08-21 DIAGNOSIS — F90.2 ATTENTION DEFICIT HYPERACTIVITY DISORDER (ADHD), COMBINED TYPE: ICD-10-CM

## 2023-08-22 RX ORDER — DEXTROAMPHETAMINE SACCHARATE, AMPHETAMINE ASPARTATE, DEXTROAMPHETAMINE SULFATE AND AMPHETAMINE SULFATE 1.875; 1.875; 1.875; 1.875 MG/1; MG/1; MG/1; MG/1
7.5 TABLET ORAL 2 TIMES DAILY
Qty: 60 TABLET | Refills: 0 | Status: SHIPPED | OUTPATIENT
Start: 2023-09-20 | End: 2023-10-20

## 2023-08-22 RX ORDER — DEXTROAMPHETAMINE SACCHARATE, AMPHETAMINE ASPARTATE, DEXTROAMPHETAMINE SULFATE AND AMPHETAMINE SULFATE 1.875; 1.875; 1.875; 1.875 MG/1; MG/1; MG/1; MG/1
7.5 TABLET ORAL 2 TIMES DAILY
Qty: 60 TABLET | Refills: 0 | Status: SHIPPED | OUTPATIENT
Start: 2023-08-22 | End: 2023-09-21

## 2023-08-29 RX ORDER — PANTOPRAZOLE SODIUM 40 MG/1
40 TABLET, DELAYED RELEASE ORAL 2 TIMES DAILY
Qty: 60 TABLET | Refills: 5 | Status: SHIPPED | OUTPATIENT
Start: 2023-08-29 | End: 2024-02-25

## 2023-09-28 DIAGNOSIS — F90.2 ATTENTION DEFICIT HYPERACTIVITY DISORDER (ADHD), COMBINED TYPE: ICD-10-CM

## 2023-09-28 RX ORDER — DEXTROAMPHETAMINE SACCHARATE, AMPHETAMINE ASPARTATE, DEXTROAMPHETAMINE SULFATE AND AMPHETAMINE SULFATE 1.875; 1.875; 1.875; 1.875 MG/1; MG/1; MG/1; MG/1
7.5 TABLET ORAL 2 TIMES DAILY
Qty: 60 TABLET | Refills: 0 | Status: SHIPPED | OUTPATIENT
Start: 2023-09-28 | End: 2023-10-28

## 2023-11-13 DIAGNOSIS — F90.2 ATTENTION DEFICIT HYPERACTIVITY DISORDER (ADHD), COMBINED TYPE: ICD-10-CM

## 2023-11-13 RX ORDER — DEXTROAMPHETAMINE SACCHARATE, AMPHETAMINE ASPARTATE, DEXTROAMPHETAMINE SULFATE AND AMPHETAMINE SULFATE 1.875; 1.875; 1.875; 1.875 MG/1; MG/1; MG/1; MG/1
7.5 TABLET ORAL 2 TIMES DAILY
Qty: 60 TABLET | Refills: 0 | Status: SHIPPED | OUTPATIENT
Start: 2023-11-13 | End: 2023-12-13

## 2023-12-12 DIAGNOSIS — F90.2 ATTENTION DEFICIT HYPERACTIVITY DISORDER (ADHD), COMBINED TYPE: ICD-10-CM

## 2023-12-12 RX ORDER — DEXTROAMPHETAMINE SACCHARATE, AMPHETAMINE ASPARTATE, DEXTROAMPHETAMINE SULFATE AND AMPHETAMINE SULFATE 1.875; 1.875; 1.875; 1.875 MG/1; MG/1; MG/1; MG/1
7.5 TABLET ORAL 2 TIMES DAILY
Qty: 60 TABLET | Refills: 0 | Status: SHIPPED | OUTPATIENT
Start: 2023-12-12 | End: 2024-01-11

## 2024-01-10 DIAGNOSIS — F90.2 ATTENTION DEFICIT HYPERACTIVITY DISORDER (ADHD), COMBINED TYPE: ICD-10-CM

## 2024-01-10 RX ORDER — DEXTROAMPHETAMINE SACCHARATE, AMPHETAMINE ASPARTATE, DEXTROAMPHETAMINE SULFATE AND AMPHETAMINE SULFATE 1.875; 1.875; 1.875; 1.875 MG/1; MG/1; MG/1; MG/1
7.5 TABLET ORAL 2 TIMES DAILY
Qty: 60 TABLET | Refills: 0 | Status: SHIPPED | OUTPATIENT
Start: 2024-01-10 | End: 2024-02-09

## 2024-01-30 LAB — MAMMOGRAPHY, EXTERNAL: NORMAL

## 2024-02-02 LAB — MAMMOGRAPHY, EXTERNAL: NORMAL

## 2024-02-23 DIAGNOSIS — F90.2 ATTENTION DEFICIT HYPERACTIVITY DISORDER (ADHD), COMBINED TYPE: ICD-10-CM

## 2024-02-24 RX ORDER — DEXTROAMPHETAMINE SACCHARATE, AMPHETAMINE ASPARTATE, DEXTROAMPHETAMINE SULFATE AND AMPHETAMINE SULFATE 1.875; 1.875; 1.875; 1.875 MG/1; MG/1; MG/1; MG/1
7.5 TABLET ORAL 2 TIMES DAILY
Qty: 60 TABLET | Refills: 0 | Status: SHIPPED | OUTPATIENT
Start: 2024-02-24 | End: 2024-03-25

## 2024-03-28 DIAGNOSIS — F90.2 ATTENTION DEFICIT HYPERACTIVITY DISORDER (ADHD), COMBINED TYPE: ICD-10-CM

## 2024-03-28 NOTE — TELEPHONE ENCOUNTER
Last appointment: 12/21/2023   Next Appointment: Visit date not found     Allergies:  Allergies   Allergen Reactions    Buspar [Buspirone]     Zoloft [Sertraline Hcl]          Recent Vitals:  Wt Readings from Last 3 Encounters:   12/21/23 106.1 kg (234 lb)   06/30/23 112.5 kg (248 lb)   02/03/23 109.8 kg (242 lb)     Ht Readings from Last 3 Encounters:   06/30/23 1.6 m (5' 3\")   11/02/22 1.626 m (5' 4\")   10/06/22 1.626 m (5' 4\")     BP Readings from Last 3 Encounters:   12/21/23 128/88   06/30/23 (!) 129/91   02/03/23 124/88     BMI Readings from Last 3 Encounters:   12/21/23 41.45 kg/m²   06/30/23 43.93 kg/m²   02/03/23 41.54 kg/m²       Recent Labs__________________________________________________________________________    Renal:   Creatinine   Date Value Ref Range Status   01/03/2024 0.7 0.5 - 1.2 MG/DL Final     BUN   Date Value Ref Range Status   01/03/2024 18 3 - 29 MG/DL Final     Sodium   Date Value Ref Range Status   01/03/2024 138 135 - 148 MEQ/L Final     Potassium   Date Value Ref Range Status   01/03/2024 4.7 3.4 - 5.3 MEQ/L Final     Comment:     HEMOLYZED SPECIMEN POTASSIUM RESULTS ARE ELEVATED BY SPECIMEN HEMOLYSIS     Chloride   Date Value Ref Range Status   01/03/2024 103 96 - 110 MEQ/L Final     CO2   Date Value Ref Range Status   01/03/2024 25 19 - 32 MEQ/L Final       BMP:    Sodium   Date Value Ref Range Status   01/03/2024 138 135 - 148 MEQ/L Final     Potassium   Date Value Ref Range Status   01/03/2024 4.7 3.4 - 5.3 MEQ/L Final     Comment:     HEMOLYZED SPECIMEN POTASSIUM RESULTS ARE ELEVATED BY SPECIMEN HEMOLYSIS     Chloride   Date Value Ref Range Status   01/03/2024 103 96 - 110 MEQ/L Final     CO2   Date Value Ref Range Status   01/03/2024 25 19 - 32 MEQ/L Final     BUN   Date Value Ref Range Status   01/03/2024 18 3 - 29 MG/DL Final     Creatinine   Date Value Ref Range Status   01/03/2024 0.7 0.5 - 1.2 MG/DL Final     Glucose   Date Value Ref Range Status   01/03/2024 80 70 - 99

## 2024-04-01 RX ORDER — DEXTROAMPHETAMINE SACCHARATE, AMPHETAMINE ASPARTATE, DEXTROAMPHETAMINE SULFATE AND AMPHETAMINE SULFATE 1.875; 1.875; 1.875; 1.875 MG/1; MG/1; MG/1; MG/1
7.5 TABLET ORAL 2 TIMES DAILY
Qty: 60 TABLET | Refills: 0 | Status: SHIPPED | OUTPATIENT
Start: 2024-04-01 | End: 2024-05-01

## 2024-04-10 DIAGNOSIS — R11.0 NAUSEA: ICD-10-CM

## 2024-04-10 RX ORDER — ONDANSETRON 4 MG/1
4 TABLET, ORALLY DISINTEGRATING ORAL EVERY 8 HOURS PRN
Qty: 30 TABLET | Refills: 5 | Status: SHIPPED | OUTPATIENT
Start: 2024-04-10

## 2024-04-10 NOTE — TELEPHONE ENCOUNTER
10/19/2016 NEGATIVE NEG MG/DL Final     Bilirubin, UA   Date Value Ref Range Status   04/13/2018 negative  Final     Ketones, Urine   Date Value Ref Range Status   10/19/2016 NEGATIVE NEG MG/DL Final     Ketones, UA   Date Value Ref Range Status   04/13/2018 negative  Final     Specific Gravity, UA   Date Value Ref Range Status   10/19/2016 1.009 1.001 - 1.035 Final     Spec Grav, UA   Date Value Ref Range Status   04/13/2018 1.025  Final     Blood, Urine   Date Value Ref Range Status   10/19/2016 MODERATE (A) NEG Final     Blood, UA POC   Date Value Ref Range Status   04/13/2018 moderate  Final     pH, Urine   Date Value Ref Range Status   10/19/2016 6.0 5.0 - 8.0 Final     Protein, UA   Date Value Ref Range Status   10/19/2016 NEGATIVE NEG MG/DL Final     Protein, UA POC   Date Value Ref Range Status   04/13/2018 100 mg/dL  Final     Urobilinogen, Urine   Date Value Ref Range Status   10/19/2016 NORMAL 0.2 - 1.0 EU/DL Final     Nitrite Urine, Quantitative   Date Value Ref Range Status   10/19/2016 NEGATIVE NEG Final     Leukocyte Esterase, Urine   Date Value Ref Range Status   10/19/2016 NEGATIVE NEG Final     Leukocytes, UA   Date Value Ref Range Status   04/13/2018 small  Final     LEUKOCYTES, UA   Date Value Ref Range Status   01/03/2024 neg neg Final     RBC, UA   Date Value Ref Range Status   10/19/2016 2 0 - 6 /HPF Final     WBC, UA   Date Value Ref Range Status   10/19/2016 4 0 - 5 /HPF Final     Bacteria, UA   Date Value Ref Range Status   10/19/2016 OCCASIONAL (A) NEG /HPF Final       Patient Care Team:  Jackie Gamboa MD as PCP - General (Family Medicine)  Jackie Gamboa MD as PCP - Empaneled Provider  jeannette reyes (Physical Therapist)  Rodney Pimentel MD as Consulting Physician (Neurology)  Esperanza Hallman MD (Obstetrics & Gynecology)  Walt Pinzon MD as Consulting Physician (Pulmonary Disease)  Tara Macedo DO as Consulting Physician (Neurology)     Requested Pharmacy

## 2024-05-06 DIAGNOSIS — F90.2 ATTENTION DEFICIT HYPERACTIVITY DISORDER (ADHD), COMBINED TYPE: ICD-10-CM

## 2024-05-06 RX ORDER — DEXTROAMPHETAMINE SACCHARATE, AMPHETAMINE ASPARTATE, DEXTROAMPHETAMINE SULFATE AND AMPHETAMINE SULFATE 1.875; 1.875; 1.875; 1.875 MG/1; MG/1; MG/1; MG/1
7.5 TABLET ORAL 2 TIMES DAILY
Qty: 60 TABLET | Refills: 0 | Status: SHIPPED | OUTPATIENT
Start: 2024-05-06 | End: 2024-06-05

## 2024-05-06 NOTE — TELEPHONE ENCOUNTER
Calcium   Date Value Ref Range Status   01/03/2024 9.2 8.5 - 10.5 MG/DL Final     Total Bilirubin   Date Value Ref Range Status   01/03/2024 0.2 0.0 - 1.2 MG/DL Final     Alkaline Phosphatase   Date Value Ref Range Status   01/03/2024 79 23 - 144 U/L Final     AST   Date Value Ref Range Status   01/03/2024 19 0 - 55 U/L Final     ALT   Date Value Ref Range Status   01/03/2024 23 0 - 60 U/L Final     Est, Glom Filt Rate   Date Value Ref Range Status   01/03/2024 110 >60 MLS/MIN/1.73M2 Final   06/30/2023 >60 >60 mL/min/1.73m2 Final     Comment:             These results are not intended for use in patients <18 years of age.          eGFR results are calculated without a race factor using the 2021 CKD-EPI equation.  Careful clinical correlation is recommended, particularly when comparing to results   calculated using previous equations.  The CKD-EPI equation is less accurate in patients with extremes of muscle mass, extra-renal   metabolism of creatine, excessive creatine ingestion, or following therapy that affects   renal tubular secretion.       GFR    Date Value Ref Range Status   09/02/2022 >60 >60 mL/min/1.73m2 Final     Albumin/Globulin Ratio   Date Value Ref Range Status   01/03/2024 2.0 0.8 - 2.6 RATIO Final     Globulin   Date Value Ref Range Status   01/03/2024 2.2 1.9 - 3.6 G/DL Final       Lipids:    Cholesterol, Total   Date Value Ref Range Status   01/03/2024 199 <200 MG/DL Final     Comment:     (NOTE)  DESIRABLE:   <200 MG/DL  BORDERLINE:  200-239 MG/DL  HIGHER RISK:  >239 MG/DL       Triglycerides   Date Value Ref Range Status   01/03/2024 192 (H) <150 MG/DL Final     Comment:     (NOTE)  NORMAL: <150 MG/DL  BORDERLINE HIGH: 150-199 MG/DL  HIGH: 200-499 MG/DL  VERY HIGH: > OR = 500 MG/DL  \"Nonfasting specimens may have modest increases in Triglyceride  levels\" (TIMOTHY Intern Med, 2019)       HDL Cholesterol   Date Value Ref Range Status   01/03/2024 45 (L) >59 MG/DL Final

## 2024-06-10 DIAGNOSIS — F90.2 ATTENTION DEFICIT HYPERACTIVITY DISORDER (ADHD), COMBINED TYPE: ICD-10-CM

## 2024-06-10 RX ORDER — DEXTROAMPHETAMINE SACCHARATE, AMPHETAMINE ASPARTATE, DEXTROAMPHETAMINE SULFATE AND AMPHETAMINE SULFATE 1.875; 1.875; 1.875; 1.875 MG/1; MG/1; MG/1; MG/1
7.5 TABLET ORAL 2 TIMES DAILY
Qty: 60 TABLET | Refills: 0 | Status: SHIPPED | OUTPATIENT
Start: 2024-06-10 | End: 2024-07-10

## 2024-07-09 DIAGNOSIS — F90.2 ATTENTION DEFICIT HYPERACTIVITY DISORDER (ADHD), COMBINED TYPE: ICD-10-CM

## 2024-07-09 RX ORDER — DEXTROAMPHETAMINE SACCHARATE, AMPHETAMINE ASPARTATE, DEXTROAMPHETAMINE SULFATE AND AMPHETAMINE SULFATE 1.875; 1.875; 1.875; 1.875 MG/1; MG/1; MG/1; MG/1
7.5 TABLET ORAL 2 TIMES DAILY
Qty: 60 TABLET | Refills: 0 | Status: SHIPPED | OUTPATIENT
Start: 2024-07-09 | End: 2024-08-08

## 2024-08-29 DIAGNOSIS — F90.2 ATTENTION DEFICIT HYPERACTIVITY DISORDER (ADHD), COMBINED TYPE: ICD-10-CM

## 2024-08-29 RX ORDER — DEXTROAMPHETAMINE SACCHARATE, AMPHETAMINE ASPARTATE, DEXTROAMPHETAMINE SULFATE AND AMPHETAMINE SULFATE 1.875; 1.875; 1.875; 1.875 MG/1; MG/1; MG/1; MG/1
7.5 TABLET ORAL 2 TIMES DAILY
Qty: 60 TABLET | Refills: 0 | Status: SHIPPED | OUTPATIENT
Start: 2024-08-29 | End: 2024-09-28

## 2024-10-01 DIAGNOSIS — F90.2 ATTENTION DEFICIT HYPERACTIVITY DISORDER (ADHD), COMBINED TYPE: ICD-10-CM

## 2024-10-01 RX ORDER — DEXTROAMPHETAMINE SACCHARATE, AMPHETAMINE ASPARTATE, DEXTROAMPHETAMINE SULFATE AND AMPHETAMINE SULFATE 1.875; 1.875; 1.875; 1.875 MG/1; MG/1; MG/1; MG/1
7.5 TABLET ORAL 2 TIMES DAILY
Qty: 60 TABLET | Refills: 0 | Status: SHIPPED | OUTPATIENT
Start: 2024-10-01 | End: 2024-10-31

## 2024-11-06 ENCOUNTER — OFFICE VISIT (OUTPATIENT)
Dept: FAMILY MEDICINE CLINIC | Age: 44
End: 2024-11-06
Payer: COMMERCIAL

## 2024-11-06 VITALS
WEIGHT: 235 LBS | OXYGEN SATURATION: 98 % | DIASTOLIC BLOOD PRESSURE: 88 MMHG | SYSTOLIC BLOOD PRESSURE: 128 MMHG | HEART RATE: 80 BPM | BODY MASS INDEX: 41.63 KG/M2

## 2024-11-06 DIAGNOSIS — M25.551 LATERAL PAIN OF RIGHT HIP: ICD-10-CM

## 2024-11-06 DIAGNOSIS — M79.7 FIBROMYALGIA: ICD-10-CM

## 2024-11-06 DIAGNOSIS — R82.998 DARK URINE: ICD-10-CM

## 2024-11-06 DIAGNOSIS — F90.2 ATTENTION DEFICIT HYPERACTIVITY DISORDER (ADHD), COMBINED TYPE: Primary | ICD-10-CM

## 2024-11-06 DIAGNOSIS — Z51.81 MEDICATION MONITORING ENCOUNTER: ICD-10-CM

## 2024-11-06 DIAGNOSIS — Z86.2 HISTORY OF ANEMIA: ICD-10-CM

## 2024-11-06 LAB
BILIRUBIN, POC: NEGATIVE
BLOOD URINE, POC: NORMAL
CLARITY, POC: NORMAL
COLOR, POC: NORMAL
GLUCOSE URINE, POC: NEGATIVE MG/DL
KETONES, POC: NEGATIVE MG/DL
LEUKOCYTE EST, POC: NEGATIVE
NITRITE, POC: NEGATIVE
PH, POC: 6
PROTEIN, POC: NEGATIVE MG/DL
SPECIFIC GRAVITY, POC: >=1.03
UROBILINOGEN, POC: NORMAL MG/DL

## 2024-11-06 PROCEDURE — G8417 CALC BMI ABV UP PARAM F/U: HCPCS | Performed by: FAMILY MEDICINE

## 2024-11-06 PROCEDURE — G8427 DOCREV CUR MEDS BY ELIG CLIN: HCPCS | Performed by: FAMILY MEDICINE

## 2024-11-06 PROCEDURE — 81002 URINALYSIS NONAUTO W/O SCOPE: CPT | Performed by: FAMILY MEDICINE

## 2024-11-06 PROCEDURE — 1036F TOBACCO NON-USER: CPT | Performed by: FAMILY MEDICINE

## 2024-11-06 PROCEDURE — 99214 OFFICE O/P EST MOD 30 MIN: CPT | Performed by: FAMILY MEDICINE

## 2024-11-06 PROCEDURE — 36415 COLL VENOUS BLD VENIPUNCTURE: CPT | Performed by: FAMILY MEDICINE

## 2024-11-06 PROCEDURE — G8484 FLU IMMUNIZE NO ADMIN: HCPCS | Performed by: FAMILY MEDICINE

## 2024-11-06 RX ORDER — SPIRONOLACTONE 50 MG/1
50 TABLET, FILM COATED ORAL DAILY
COMMUNITY
Start: 2024-01-03 | End: 2024-11-06

## 2024-11-06 RX ORDER — ETONOGESTREL AND ETHINYL ESTRADIOL VAGINAL RING .015; .12 MG/D; MG/D
RING VAGINAL
COMMUNITY
Start: 2024-01-03 | End: 2024-11-06

## 2024-11-06 NOTE — PROGRESS NOTES
Panel-PM-HI Res-UR Interp-A  The following orders have not been finalized:  -     amphetamine-dextroamphetamine (ADDERALL, 10MG,) 10 MG tablet  2. Medication monitoring encounter  -     Drug Panel-PM-HI Res-UR Interp-A  3. Fibromyalgia  4. History of anemia  -     CBC  -     Iron and TIBC  5. Dark urine  -     POCT Urinalysis no Micro  6. Lateral pain of right hip  -     [place] - Anil Cedeno PA-C, Orthopedic Surgery, [place]  7. Adult BMI 40.0-44.9 kg/sq m    Problems listed above are stable and therapeutic plan is unchanged unless otherwise specified.      Assessment & Plan  ADHD.  The dose of Adderall will be increased to 10 mg twice a day. The previous dose will be discontinued. If the new dose is not effective, further adjustments may be considered.    Fibromyalgia.  No changes to the current treatment plan. Refills of tizanidine as needed will be provided.    Obesity.  She will try to incorporate more exercise into her routine. She will read about Contrave and decide if she wants a prescription for it or a referral to bariatrics. She is advised to use the [employer] cosmo to track her calorie intake and exercise.    Anemia.  Due to symptoms of craving ice and dark urine, blood work will be done to check for anemia and iron levels. A urine dip test will be performed to check for infection.    Right Hip Pain.  Suspected bursitis. A referral to orthopedics will be made for a possible injection to relieve pain. Physical therapy is also an option if needed.    Follow-up  Return in 2 months for follow-up on ADHD medication adjustment.    See orders and comments above for details of workup or medication orders.          _________________________________________________  Plan:     Return in about 2 months (around 1/6/2025), or if symptoms worsen or fail to improve, for recheck on higher dose adderall.    Electronically signed by Jackie Gamboa MD on 11/6/24 at 1:16 PM EST    Portions of this note have been

## 2024-11-07 LAB
DEPRECATED RDW RBC AUTO: 15.7 % (ref 12.4–15.4)
HCT VFR BLD AUTO: 31.5 % (ref 36–48)
HGB BLD-MCNC: 10.2 G/DL (ref 12–16)
IRON SATN MFR SERPL: 10 % (ref 15–50)
IRON SERPL-MCNC: 40 UG/DL (ref 37–145)
MCH RBC QN AUTO: 26.7 PG (ref 26–34)
MCHC RBC AUTO-ENTMCNC: 32.4 G/DL (ref 31–36)
MCV RBC AUTO: 82.5 FL (ref 80–100)
PLATELET # BLD AUTO: 356 K/UL (ref 135–450)
PMV BLD AUTO: 9.3 FL (ref 5–10.5)
RBC # BLD AUTO: 3.82 M/UL (ref 4–5.2)
TIBC SERPL-MCNC: 409 UG/DL (ref 260–445)
WBC # BLD AUTO: 6.7 K/UL (ref 4–11)

## 2024-11-09 LAB
6MAM UR QL: NOT DETECTED
7AMINOCLONAZEPAM UR QL: NOT DETECTED
A-OH ALPRAZ UR QL: NOT DETECTED
ALPHA-OH-MIDAZOLAM, URINE: NOT DETECTED
ALPRAZ UR QL: NOT DETECTED
AMPHET UR QL SCN: PRESENT
ANNOTATION COMMENT IMP: NORMAL
ANNOTATION COMMENT IMP: NORMAL
BARBITURATES UR QL: NEGATIVE
BUPRENORPHINE UR QL: NOT DETECTED
BZE UR QL: NEGATIVE
CARBOXYTHC UR QL: NEGATIVE
CARISOPRODOL UR QL: NEGATIVE
CLONAZEPAM UR QL: NOT DETECTED
CODEINE UR QL: NOT DETECTED
CREAT UR-MCNC: 153.6 MG/DL (ref 20–400)
DIAZEPAM UR QL: NOT DETECTED
ETHYL GLUCURONIDE UR QL: NEGATIVE
FENTANYL UR QL: NOT DETECTED
GABAPENTIN: NOT DETECTED
HYDROCODONE UR QL: NOT DETECTED
HYDROMORPHONE UR QL: NOT DETECTED
LORAZEPAM UR QL: NOT DETECTED
MDA UR QL: NOT DETECTED
MDEA UR QL: NOT DETECTED
MDMA UR QL: NOT DETECTED
MEPERIDINE UR QL: NOT DETECTED
METHADONE UR QL: NEGATIVE
METHAMPHET UR QL: NOT DETECTED
MIDAZOLAM UR QL SCN: NOT DETECTED
MORPHINE UR QL: NOT DETECTED
NALOXONE: NOT DETECTED
NORBUPRENORPHINE UR QL CFM: NOT DETECTED
NORDIAZEPAM UR QL: NOT DETECTED
NORFENTANYL UR QL: NOT DETECTED
NORHYDROCODONE UR QL CFM: NOT DETECTED
NOROXYCODONE UR QL CFM: NOT DETECTED
NOROXYMORPHONE, URINE: NOT DETECTED
OXAZEPAM UR QL: NOT DETECTED
OXYCODONE UR QL: NOT DETECTED
OXYMORPHONE UR QL: NOT DETECTED
PATHOLOGY STUDY: NORMAL
PCP UR QL: NEGATIVE
PHENTERMINE UR QL: NOT DETECTED
PPAA UR QL: NOT DETECTED
PREGABALIN: NOT DETECTED
SERVICE CMNT-IMP: NORMAL
TAPENTADOL UR QL SCN: NOT DETECTED
TAPENTADOL-O-SULFATE, URINE: NOT DETECTED
TEMAZEPAM UR QL: NOT DETECTED
TRAMADOL UR QL: NEGATIVE
ZOLPIDEM UR QL: NOT DETECTED

## 2024-11-17 RX ORDER — DEXTROAMPHETAMINE SACCHARATE, AMPHETAMINE ASPARTATE, DEXTROAMPHETAMINE SULFATE AND AMPHETAMINE SULFATE 2.5; 2.5; 2.5; 2.5 MG/1; MG/1; MG/1; MG/1
10 TABLET ORAL 2 TIMES DAILY
Qty: 60 TABLET | Refills: 0 | Status: SHIPPED | OUTPATIENT
Start: 2024-11-17 | End: 2024-12-17

## 2024-12-23 DIAGNOSIS — R11.0 NAUSEA: ICD-10-CM

## 2024-12-23 DIAGNOSIS — F90.2 ATTENTION DEFICIT HYPERACTIVITY DISORDER (ADHD), COMBINED TYPE: ICD-10-CM

## 2024-12-23 RX ORDER — DEXTROAMPHETAMINE SACCHARATE, AMPHETAMINE ASPARTATE, DEXTROAMPHETAMINE SULFATE AND AMPHETAMINE SULFATE 2.5; 2.5; 2.5; 2.5 MG/1; MG/1; MG/1; MG/1
10 TABLET ORAL 2 TIMES DAILY
Qty: 60 TABLET | Refills: 0 | Status: SHIPPED | OUTPATIENT
Start: 2024-12-23 | End: 2025-01-22

## 2024-12-23 RX ORDER — ONDANSETRON 4 MG/1
4 TABLET, ORALLY DISINTEGRATING ORAL EVERY 8 HOURS PRN
Qty: 30 TABLET | Refills: 5 | Status: SHIPPED | OUTPATIENT
Start: 2024-12-23

## 2025-01-28 DIAGNOSIS — F90.2 ATTENTION DEFICIT HYPERACTIVITY DISORDER (ADHD), COMBINED TYPE: ICD-10-CM

## 2025-01-28 RX ORDER — DEXTROAMPHETAMINE SACCHARATE, AMPHETAMINE ASPARTATE, DEXTROAMPHETAMINE SULFATE AND AMPHETAMINE SULFATE 1.875; 1.875; 1.875; 1.875 MG/1; MG/1; MG/1; MG/1
7.5 TABLET ORAL 2 TIMES DAILY
Qty: 60 TABLET | Refills: 0 | Status: CANCELLED | OUTPATIENT
Start: 2025-01-28 | End: 2025-02-27

## 2025-01-28 RX ORDER — DEXTROAMPHETAMINE SACCHARATE, AMPHETAMINE ASPARTATE, DEXTROAMPHETAMINE SULFATE AND AMPHETAMINE SULFATE 2.5; 2.5; 2.5; 2.5 MG/1; MG/1; MG/1; MG/1
10 TABLET ORAL 2 TIMES DAILY
Qty: 60 TABLET | Refills: 0 | Status: SHIPPED | OUTPATIENT
Start: 2025-01-28 | End: 2025-02-27

## 2025-01-28 NOTE — TELEPHONE ENCOUNTER
Patient had an appointment today but had to cancel it because she had to  her grandchild from school.  She stated that she will reschedule with Maxine.

## 2025-02-03 ENCOUNTER — OFFICE VISIT (OUTPATIENT)
Dept: ORTHOPEDIC SURGERY | Age: 45
End: 2025-02-03
Payer: COMMERCIAL

## 2025-02-03 VITALS — BODY MASS INDEX: 44.65 KG/M2 | HEART RATE: 92 BPM | OXYGEN SATURATION: 98 % | HEIGHT: 63 IN | WEIGHT: 252 LBS

## 2025-02-03 DIAGNOSIS — S39.012A STRAIN OF LUMBAR REGION, INITIAL ENCOUNTER: Primary | ICD-10-CM

## 2025-02-03 PROCEDURE — 99204 OFFICE O/P NEW MOD 45 MIN: CPT | Performed by: PHYSICIAN ASSISTANT

## 2025-02-03 PROCEDURE — G8417 CALC BMI ABV UP PARAM F/U: HCPCS | Performed by: PHYSICIAN ASSISTANT

## 2025-02-03 PROCEDURE — 1036F TOBACCO NON-USER: CPT | Performed by: PHYSICIAN ASSISTANT

## 2025-02-03 PROCEDURE — G8427 DOCREV CUR MEDS BY ELIG CLIN: HCPCS | Performed by: PHYSICIAN ASSISTANT

## 2025-02-03 ASSESSMENT — ENCOUNTER SYMPTOMS
BACK PAIN: 1
EYES NEGATIVE: 1
GASTROINTESTINAL NEGATIVE: 1
RESPIRATORY NEGATIVE: 1

## 2025-02-03 NOTE — PATIENT INSTRUCTIONS
Follow up in 8 weeks.     Out patient Physical Therapy has been ordered by your provider. [facility] will call you to set up therapy. If you have not heard from them within 24-48 hours of today's appointment, please reach out to them at [phone].
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
## 2025-02-28 ENCOUNTER — OFFICE VISIT (OUTPATIENT)
Dept: FAMILY MEDICINE CLINIC | Age: 45
End: 2025-02-28
Payer: COMMERCIAL

## 2025-02-28 VITALS
DIASTOLIC BLOOD PRESSURE: 88 MMHG | HEART RATE: 82 BPM | OXYGEN SATURATION: 97 % | WEIGHT: 247 LBS | BODY MASS INDEX: 43.75 KG/M2 | SYSTOLIC BLOOD PRESSURE: 128 MMHG

## 2025-02-28 DIAGNOSIS — B37.31 YEAST VAGINITIS: ICD-10-CM

## 2025-02-28 DIAGNOSIS — R30.0 DYSURIA: ICD-10-CM

## 2025-02-28 DIAGNOSIS — M79.7 FIBROMYALGIA: ICD-10-CM

## 2025-02-28 DIAGNOSIS — R82.90 URINE ABNORMALITY: Primary | ICD-10-CM

## 2025-02-28 DIAGNOSIS — R31.0 GROSS HEMATURIA: ICD-10-CM

## 2025-02-28 DIAGNOSIS — F19.982 DRUG-INDUCED INSOMNIA (HCC): ICD-10-CM

## 2025-02-28 LAB
BILIRUBIN, POC: NEGATIVE
BLOOD URINE, POC: ABNORMAL
CLARITY, POC: CLEAR
COLOR, POC: YELLOW
GLUCOSE URINE, POC: NEGATIVE MG/DL
KETONES, POC: NEGATIVE MG/DL
LEUKOCYTE EST, POC: NEGATIVE
NITRITE, POC: NEGATIVE
PH, POC: 5.5
PROTEIN, POC: NEGATIVE MG/DL
SPECIFIC GRAVITY, POC: 1.02
UROBILINOGEN, POC: 0.2 MG/DL

## 2025-02-28 PROCEDURE — G8417 CALC BMI ABV UP PARAM F/U: HCPCS | Performed by: FAMILY MEDICINE

## 2025-02-28 PROCEDURE — 81002 URINALYSIS NONAUTO W/O SCOPE: CPT | Performed by: FAMILY MEDICINE

## 2025-02-28 PROCEDURE — G8427 DOCREV CUR MEDS BY ELIG CLIN: HCPCS | Performed by: FAMILY MEDICINE

## 2025-02-28 PROCEDURE — 99214 OFFICE O/P EST MOD 30 MIN: CPT | Performed by: FAMILY MEDICINE

## 2025-02-28 PROCEDURE — 1036F TOBACCO NON-USER: CPT | Performed by: FAMILY MEDICINE

## 2025-02-28 RX ORDER — CIPROFLOXACIN 500 MG/1
500 TABLET, FILM COATED ORAL 2 TIMES DAILY
Qty: 20 TABLET | Refills: 0 | Status: SHIPPED | OUTPATIENT
Start: 2025-02-28 | End: 2025-03-10

## 2025-02-28 RX ORDER — PHENAZOPYRIDINE HYDROCHLORIDE 100 MG/1
100 TABLET, FILM COATED ORAL EVERY 8 HOURS PRN
COMMUNITY
Start: 2025-02-18 | End: 2025-02-28

## 2025-02-28 RX ORDER — FLUCONAZOLE 150 MG/1
150 TABLET ORAL ONCE
Qty: 2 TABLET | Refills: 2 | Status: SHIPPED | OUTPATIENT
Start: 2025-02-28 | End: 2025-02-28

## 2025-02-28 RX ORDER — PHENAZOPYRIDINE HYDROCHLORIDE 200 MG/1
200 TABLET, FILM COATED ORAL 3 TIMES DAILY PRN
Qty: 12 TABLET | Refills: 0 | Status: SHIPPED | OUTPATIENT
Start: 2025-02-28 | End: 2025-03-04

## 2025-02-28 RX ORDER — METHOCARBAMOL 750 MG/1
750 TABLET, FILM COATED ORAL NIGHTLY
Qty: 90 TABLET | Refills: 0 | Status: SHIPPED | OUTPATIENT
Start: 2025-02-28 | End: 2025-05-29

## 2025-02-28 RX ORDER — NITROFURANTOIN 25; 75 MG/1; MG/1
100 CAPSULE ORAL 2 TIMES DAILY
COMMUNITY
Start: 2025-02-18 | End: 2025-02-28

## 2025-02-28 ASSESSMENT — ENCOUNTER SYMPTOMS
ABDOMINAL PAIN: 0
CHEST TIGHTNESS: 0
COUGH: 0
SHORTNESS OF BREATH: 0
WHEEZING: 0
BACK PAIN: 0

## 2025-02-28 ASSESSMENT — PATIENT HEALTH QUESTIONNAIRE - PHQ9
1. LITTLE INTEREST OR PLEASURE IN DOING THINGS: NOT AT ALL
SUM OF ALL RESPONSES TO PHQ QUESTIONS 1-9: 0
2. FEELING DOWN, DEPRESSED OR HOPELESS: NOT AT ALL
SUM OF ALL RESPONSES TO PHQ9 QUESTIONS 1 & 2: 0
SUM OF ALL RESPONSES TO PHQ QUESTIONS 1-9: 0

## 2025-02-28 NOTE — PROGRESS NOTES
coffee, cola, and carbonated beverages. If symptoms persist beyond 3 weeks, she should contact the office for further evaluation, including a urine dipstick test.    Fibromyalgia.  A prescription for Robaxin 750 mg at bedtime will be issued to manage her fibromyalgia symptoms. She is advised to discontinue tizanidine due to adverse effects, including vivid dreams and daytime fatigue.    Post-cholecystectomy weight gain.  She has gained weight since her gallbladder removal in November 2024, increasing from 235 pounds to 247 pounds. However, she was 252 pounds this month, so she is trending down.    PROCEDURE  The patient underwent cholecystectomy on 11/11/2024.    See orders and comments above for details of workup or medication orders.          _________________________________________________  Plan:     Return if symptoms worsen or fail to improve, for Or about every 6 months as needed.    Electronically signed by Jackie Gamboa MD on 2/28/25 at 11:20 AM EST    Portions of this note have been transcribed using automated transcription software, and errors in documentation may occur as a result of inaccurate transcription.

## 2025-03-06 DIAGNOSIS — F90.2 ATTENTION DEFICIT HYPERACTIVITY DISORDER (ADHD), COMBINED TYPE: ICD-10-CM

## 2025-03-06 RX ORDER — DEXTROAMPHETAMINE SACCHARATE, AMPHETAMINE ASPARTATE, DEXTROAMPHETAMINE SULFATE AND AMPHETAMINE SULFATE 2.5; 2.5; 2.5; 2.5 MG/1; MG/1; MG/1; MG/1
10 TABLET ORAL 2 TIMES DAILY
Qty: 60 TABLET | Refills: 0 | Status: SHIPPED | OUTPATIENT
Start: 2025-03-06 | End: 2025-04-05

## 2025-03-06 RX ORDER — PANTOPRAZOLE SODIUM 40 MG/1
40 TABLET, DELAYED RELEASE ORAL 2 TIMES DAILY
Qty: 60 TABLET | Refills: 5 | Status: SHIPPED | OUTPATIENT
Start: 2025-03-06 | End: 2025-09-02

## 2025-03-12 LAB — MAMMOGRAPHY, EXTERNAL: NORMAL

## 2025-03-16 DIAGNOSIS — R31.0 GROSS HEMATURIA: Primary | ICD-10-CM

## 2025-03-18 ENCOUNTER — NURSE ONLY (OUTPATIENT)
Dept: FAMILY MEDICINE CLINIC | Age: 45
End: 2025-03-18

## 2025-03-18 DIAGNOSIS — R31.0 GROSS HEMATURIA: ICD-10-CM

## 2025-03-19 LAB
BACTERIA URNS QL MICRO: ABNORMAL /HPF
BILIRUB UR QL STRIP.AUTO: NEGATIVE
CLARITY UR: CLEAR
COLOR UR: YELLOW
EPI CELLS #/AREA URNS AUTO: 1 /HPF (ref 0–5)
GLUCOSE UR STRIP.AUTO-MCNC: NEGATIVE MG/DL
HGB UR QL STRIP.AUTO: ABNORMAL
HYALINE CASTS #/AREA URNS AUTO: 0 /LPF (ref 0–8)
KETONES UR STRIP.AUTO-MCNC: NEGATIVE MG/DL
LEUKOCYTE ESTERASE UR QL STRIP.AUTO: NEGATIVE
NITRITE UR QL STRIP.AUTO: NEGATIVE
PH UR STRIP.AUTO: 5.5 [PH] (ref 5–8)
PROT UR STRIP.AUTO-MCNC: NEGATIVE MG/DL
RBC CLUMPS #/AREA URNS AUTO: 9 /HPF (ref 0–4)
SP GR UR STRIP.AUTO: 1.02 (ref 1–1.03)
UA DIPSTICK W REFLEX MICRO PNL UR: YES
URN SPEC COLLECT METH UR: ABNORMAL
UROBILINOGEN UR STRIP-ACNC: 0.2 E.U./DL
WBC #/AREA URNS AUTO: 1 /HPF (ref 0–5)

## 2025-03-20 ENCOUNTER — RESULTS FOLLOW-UP (OUTPATIENT)
Dept: FAMILY MEDICINE CLINIC | Age: 45
End: 2025-03-20

## 2025-03-20 LAB — BACTERIA UR CULT: NORMAL

## 2025-03-20 NOTE — RESULT ENCOUNTER NOTE
Urinalysis showed small blood but no white cells or white cell enzymes and culture was negative for infection.  Push fluids and avoid cola, coffee and acidic beverages (like lemonade and orange juice).  If symptoms persist can come for office visit.

## 2025-04-03 DIAGNOSIS — F90.2 ATTENTION DEFICIT HYPERACTIVITY DISORDER (ADHD), COMBINED TYPE: ICD-10-CM

## 2025-04-04 RX ORDER — DEXTROAMPHETAMINE SACCHARATE, AMPHETAMINE ASPARTATE, DEXTROAMPHETAMINE SULFATE AND AMPHETAMINE SULFATE 2.5; 2.5; 2.5; 2.5 MG/1; MG/1; MG/1; MG/1
10 TABLET ORAL 2 TIMES DAILY
Qty: 60 TABLET | Refills: 0 | Status: SHIPPED | OUTPATIENT
Start: 2025-04-04 | End: 2025-05-04

## 2025-05-06 ENCOUNTER — OFFICE VISIT (OUTPATIENT)
Dept: FAMILY MEDICINE CLINIC | Age: 45
End: 2025-05-06
Payer: COMMERCIAL

## 2025-05-06 VITALS
OXYGEN SATURATION: 97 % | HEART RATE: 88 BPM | SYSTOLIC BLOOD PRESSURE: 136 MMHG | WEIGHT: 252 LBS | DIASTOLIC BLOOD PRESSURE: 84 MMHG | TEMPERATURE: 97.8 F | BODY MASS INDEX: 44.64 KG/M2

## 2025-05-06 DIAGNOSIS — J02.9 ACUTE PHARYNGITIS, UNSPECIFIED ETIOLOGY: Primary | ICD-10-CM

## 2025-05-06 LAB — S PYO AG THROAT QL: NORMAL

## 2025-05-06 PROCEDURE — 87880 STREP A ASSAY W/OPTIC: CPT | Performed by: NURSE PRACTITIONER

## 2025-05-06 PROCEDURE — 1036F TOBACCO NON-USER: CPT | Performed by: NURSE PRACTITIONER

## 2025-05-06 PROCEDURE — G8417 CALC BMI ABV UP PARAM F/U: HCPCS | Performed by: NURSE PRACTITIONER

## 2025-05-06 PROCEDURE — G8427 DOCREV CUR MEDS BY ELIG CLIN: HCPCS | Performed by: NURSE PRACTITIONER

## 2025-05-06 PROCEDURE — 99213 OFFICE O/P EST LOW 20 MIN: CPT | Performed by: NURSE PRACTITIONER

## 2025-05-06 RX ORDER — METHYLPREDNISOLONE 4 MG/1
TABLET ORAL
Qty: 1 KIT | Refills: 0 | Status: SHIPPED | OUTPATIENT
Start: 2025-05-06 | End: 2025-05-12

## 2025-05-06 NOTE — PROGRESS NOTES
Nicho Adams   44 y.o.  female  [id number]      Chief Complaint   Patient presents with    Cold Symptoms     Sore throat nausea post nasal drainage per pt         Subjective:  44 y.o.female is here for a follow up. She has the following chronic/acute medical problems:  Patient Active Problem List   Diagnosis    Family history of multiple sclerosis    Chronic nonintractable headache    Dysmenorrhea    NSAID induced gastritis    Dysphagia    Epigastric pain    Gastroesophageal reflux disease without esophagitis    Blood in stool    Iron deficiency anemia secondary to blood loss (chronic)    History of 2019 novel coronavirus disease (COVID-19)    COVID-19 vaccination refused    Attention deficit hyperactivity disorder (ADHD), combined type    CCC (chronic calculous cholecystitis)    Strain of left biceps tendon    Lateral epicondylitis       HPI   History of Present Illness  The patient presents for evaluation of cold symptoms.    She began experiencing a sore throat on Friday, which she suspects may be due to strep, given her history of recurrent strep infections. Today, she noticed the onset of nasal congestion and postnasal drainage. She has observed white blisters on the tonsillar region of her throat. Her appetite remains unaffected, although she has transitioned to a diet primarily consisting of soups. She reports no fever or chills but admits to feeling slightly more fatigued than usual. She is not experiencing any cough, ear pain, rhinorrhea, sinus pain or pressure, sneezing, shortness of breath, wheezing, chest tightness, body aches, or headaches. She also reports nausea that started on Friday.  She has been managing her symptoms with ibuprofen.       Review of Systems See HPI    Current Outpatient Medications   Medication Sig Dispense Refill    amphetamine-dextroamphetamine (ADDERALL, 10MG,) 10 MG tablet Take 1 tablet by mouth 2 times daily for 30 days. Max Daily Amount: 20 mg 60 tablet 0    pantoprazole

## 2025-05-14 DIAGNOSIS — F90.2 ATTENTION DEFICIT HYPERACTIVITY DISORDER (ADHD), COMBINED TYPE: ICD-10-CM

## 2025-05-14 RX ORDER — DEXTROAMPHETAMINE SACCHARATE, AMPHETAMINE ASPARTATE, DEXTROAMPHETAMINE SULFATE AND AMPHETAMINE SULFATE 2.5; 2.5; 2.5; 2.5 MG/1; MG/1; MG/1; MG/1
10 TABLET ORAL 2 TIMES DAILY
Qty: 60 TABLET | Refills: 0 | Status: SHIPPED | OUTPATIENT
Start: 2025-05-14 | End: 2025-06-13

## 2025-05-19 DIAGNOSIS — M79.7 FIBROMYALGIA: ICD-10-CM

## 2025-05-19 RX ORDER — METHOCARBAMOL 750 MG/1
750 TABLET, FILM COATED ORAL NIGHTLY
Qty: 30 TABLET | Refills: 2 | Status: SHIPPED | OUTPATIENT
Start: 2025-05-19

## 2025-06-02 ENCOUNTER — OFFICE VISIT (OUTPATIENT)
Dept: FAMILY MEDICINE CLINIC | Age: 45
End: 2025-06-02
Payer: COMMERCIAL

## 2025-06-02 VITALS
HEART RATE: 86 BPM | DIASTOLIC BLOOD PRESSURE: 82 MMHG | WEIGHT: 245 LBS | SYSTOLIC BLOOD PRESSURE: 142 MMHG | BODY MASS INDEX: 43.4 KG/M2 | OXYGEN SATURATION: 97 %

## 2025-06-02 DIAGNOSIS — G89.29 CHRONIC NONINTRACTABLE HEADACHE, UNSPECIFIED HEADACHE TYPE: ICD-10-CM

## 2025-06-02 DIAGNOSIS — R63.5 WEIGHT GAIN: ICD-10-CM

## 2025-06-02 DIAGNOSIS — R53.83 OTHER FATIGUE: ICD-10-CM

## 2025-06-02 DIAGNOSIS — R51.9 CHRONIC NONINTRACTABLE HEADACHE, UNSPECIFIED HEADACHE TYPE: ICD-10-CM

## 2025-06-02 DIAGNOSIS — R47.9 DIFFICULTY WITH SPEECH: ICD-10-CM

## 2025-06-02 DIAGNOSIS — M79.7 FIBROMYALGIA: ICD-10-CM

## 2025-06-02 DIAGNOSIS — R21 RASH: ICD-10-CM

## 2025-06-02 DIAGNOSIS — F90.2 ATTENTION DEFICIT HYPERACTIVITY DISORDER (ADHD), COMBINED TYPE: Primary | ICD-10-CM

## 2025-06-02 PROCEDURE — G8427 DOCREV CUR MEDS BY ELIG CLIN: HCPCS | Performed by: FAMILY MEDICINE

## 2025-06-02 PROCEDURE — 1036F TOBACCO NON-USER: CPT | Performed by: FAMILY MEDICINE

## 2025-06-02 PROCEDURE — G8417 CALC BMI ABV UP PARAM F/U: HCPCS | Performed by: FAMILY MEDICINE

## 2025-06-02 PROCEDURE — 36415 COLL VENOUS BLD VENIPUNCTURE: CPT | Performed by: FAMILY MEDICINE

## 2025-06-02 PROCEDURE — 99214 OFFICE O/P EST MOD 30 MIN: CPT | Performed by: FAMILY MEDICINE

## 2025-06-02 NOTE — PROGRESS NOTES
OnabotulinumtoxinA (BOTOX IJ) Inject as directed      methocarbamol (ROBAXIN) 750 MG tablet TAKE 1 TABLET BY MOUTH EVERY DAY AT NIGHT 30 tablet 2    amphetamine-dextroamphetamine (ADDERALL, 10MG,) 10 MG tablet Take 1 tablet by mouth 2 times daily for 30 days. Max Daily Amount: 20 mg 60 tablet 0    pantoprazole (PROTONIX) 40 MG tablet Take 1 tablet by mouth 2 times daily 60 tablet 5    ondansetron (ZOFRAN ODT) 4 MG disintegrating tablet Take 1 tablet by mouth every 8 hours as needed for Nausea or Vomiting 30 tablet 5    acetaminophen (TYLENOL) 500 MG tablet Take 2 tablets by mouth      valACYclovir (VALTREX) 1 g tablet Take 1 tablet by mouth daily      Multiple Vitamins-Minerals (MULTI COMPLETE PO) Take by mouth         Tobacco use history updated.   Social History     Tobacco Use   Smoking Status Former    Current packs/day: 0.00    Types: Cigarettes    Quit date: 1998    Years since quittin.9   Smokeless Tobacco Never        Nursing note reviewed.    Vitals:    25 1405   BP: (!) 142/82   BP Site: Left Upper Arm   Patient Position: Sitting   BP Cuff Size: Medium Adult   Pulse: 86   SpO2: 97%   Weight: 111.1 kg (245 lb)          BP Readings from Last 3 Encounters:   25 128/74   25 (!) 142/82   25 136/84     Wt Readings from Last 3 Encounters:   25 113.2 kg (249 lb 9.6 oz)   25 111.1 kg (245 lb)   25 114.3 kg (252 lb)     Body mass index is 43.4 kg/m².    No results found for this visit on 25.    Physical Exam  Respiratory: Clear to auscultation, no wheezing, rales or rhonchi  Cardiovascular: Regular rate and rhythm, no murmurs, rubs, or gallops    Physical Exam  Vitals and nursing note reviewed.   Constitutional:       General: She is not in acute distress.     Appearance: She is well-developed. She is not diaphoretic.   HENT:      Head: Normocephalic.   Eyes:      General:         Right eye: No discharge.         Left eye: No discharge.

## 2025-06-03 ENCOUNTER — PATIENT MESSAGE (OUTPATIENT)
Dept: FAMILY MEDICINE CLINIC | Age: 45
End: 2025-06-03

## 2025-06-03 LAB
ALBUMIN SERPL-MCNC: 4.8 G/DL (ref 3.4–5)
ALBUMIN/GLOB SERPL: 1.8 {RATIO} (ref 1.1–2.2)
ALP SERPL-CCNC: 79 U/L (ref 40–129)
ALT SERPL-CCNC: 34 U/L (ref 10–40)
ANION GAP SERPL CALCULATED.3IONS-SCNC: 12 MMOL/L (ref 3–16)
AST SERPL-CCNC: 29 U/L (ref 15–37)
BILIRUB SERPL-MCNC: 0.3 MG/DL (ref 0–1)
BUN SERPL-MCNC: 20 MG/DL (ref 7–20)
CALCIUM SERPL-MCNC: 9.9 MG/DL (ref 8.3–10.6)
CHLORIDE SERPL-SCNC: 103 MMOL/L (ref 99–110)
CO2 SERPL-SCNC: 23 MMOL/L (ref 21–32)
CREAT SERPL-MCNC: 0.8 MG/DL (ref 0.6–1.1)
DEPRECATED RDW RBC AUTO: 14.9 % (ref 12.4–15.4)
FOLATE SERPL-MCNC: 18.2 NG/ML (ref 4.78–24.2)
GFR SERPLBLD CREATININE-BSD FMLA CKD-EPI: >90 ML/MIN/{1.73_M2}
GLUCOSE SERPL-MCNC: 87 MG/DL (ref 70–99)
HCT VFR BLD AUTO: 35.4 % (ref 36–48)
HGB BLD-MCNC: 11.6 G/DL (ref 12–16)
MCH RBC QN AUTO: 28.2 PG (ref 26–34)
MCHC RBC AUTO-ENTMCNC: 32.8 G/DL (ref 31–36)
MCV RBC AUTO: 85.9 FL (ref 80–100)
PLATELET # BLD AUTO: 390 K/UL (ref 135–450)
PMV BLD AUTO: 9.1 FL (ref 5–10.5)
POTASSIUM SERPL-SCNC: 4.7 MMOL/L (ref 3.5–5.1)
PROT SERPL-MCNC: 7.4 G/DL (ref 6.4–8.2)
RBC # BLD AUTO: 4.13 M/UL (ref 4–5.2)
SODIUM SERPL-SCNC: 138 MMOL/L (ref 136–145)
TSH SERPL DL<=0.005 MIU/L-ACNC: 0.81 UIU/ML (ref 0.27–4.2)
VIT B12 SERPL-MCNC: 2072 PG/ML (ref 211–911)
WBC # BLD AUTO: 6.7 K/UL (ref 4–11)

## 2025-06-09 ENCOUNTER — OFFICE VISIT (OUTPATIENT)
Dept: FAMILY MEDICINE CLINIC | Age: 45
End: 2025-06-09
Payer: COMMERCIAL

## 2025-06-09 VITALS
HEART RATE: 81 BPM | WEIGHT: 249.6 LBS | BODY MASS INDEX: 44.21 KG/M2 | OXYGEN SATURATION: 97 % | DIASTOLIC BLOOD PRESSURE: 74 MMHG | SYSTOLIC BLOOD PRESSURE: 128 MMHG

## 2025-06-09 DIAGNOSIS — J06.9 URI, ACUTE: Primary | ICD-10-CM

## 2025-06-09 PROCEDURE — 99213 OFFICE O/P EST LOW 20 MIN: CPT | Performed by: NURSE PRACTITIONER

## 2025-06-09 PROCEDURE — 1036F TOBACCO NON-USER: CPT | Performed by: NURSE PRACTITIONER

## 2025-06-09 PROCEDURE — G8417 CALC BMI ABV UP PARAM F/U: HCPCS | Performed by: NURSE PRACTITIONER

## 2025-06-09 PROCEDURE — G8427 DOCREV CUR MEDS BY ELIG CLIN: HCPCS | Performed by: NURSE PRACTITIONER

## 2025-06-09 RX ORDER — FLUTICASONE PROPIONATE 50 MCG
1 SPRAY, SUSPENSION (ML) NASAL DAILY
Qty: 32 G | Refills: 1 | Status: SHIPPED | OUTPATIENT
Start: 2025-06-09

## 2025-06-09 RX ORDER — GUAIFENESIN 600 MG/1
600 TABLET, EXTENDED RELEASE ORAL 2 TIMES DAILY
Qty: 28 TABLET | Refills: 0 | Status: SHIPPED | OUTPATIENT
Start: 2025-06-09 | End: 2025-06-23

## 2025-06-09 RX ORDER — METHYLPREDNISOLONE 4 MG/1
TABLET ORAL
Qty: 1 KIT | Refills: 0 | Status: SHIPPED | OUTPATIENT
Start: 2025-06-09

## 2025-06-09 RX ORDER — BENZONATATE 100 MG/1
100 CAPSULE ORAL 3 TIMES DAILY PRN
Qty: 30 CAPSULE | Refills: 0 | Status: SHIPPED | OUTPATIENT
Start: 2025-06-09 | End: 2025-06-16

## 2025-06-09 NOTE — PROGRESS NOTES
capsule     Sig: Take 1 capsule by mouth 3 times daily as needed for Cough     Dispense:  30 capsule     Refill:  0    fluticasone (FLONASE) 50 MCG/ACT nasal spray     Si spray by Each Nostril route daily     Dispense:  32 g     Refill:  1    guaiFENesin (MUCINEX) 600 MG extended release tablet     Sig: Take 1 tablet by mouth 2 times daily for 14 days     Dispense:  28 tablet     Refill:  0    methylPREDNISolone (MEDROL DOSEPACK) 4 MG tablet     Sig: Take by mouth.     Dispense:  1 kit     Refill:  0    amoxicillin-clavulanate (AUGMENTIN) 875-125 MG per tablet     Sig: Take 1 tablet by mouth 2 times daily for 10 days     Dispense:  20 tablet     Refill:  0             All care gaps addressed     All questions answered    Discussed use, benefit, and side effects of prescribed medications.  Barriers to compliance discussed.  All patient questions answered.  Pt voiced understanding.     Present to the ER for any emergent or acute symptoms not managed at home or in office.    Please note that this chart was generated using dragon and or [employer] dictation software.  Although every effort was made to ensure the accuracy of this automated transcription, some errors in transcription may have occurred.    The patient (or guardian, if applicable) and other individuals in attendance with the patient were advised that Artificial Intelligence will be utilized during this visit to record, process the conversation to generate a clinical note, and support improvement of the AI technology. The patient (or guardian, if applicable) and other individuals in attendance at the appointment consented to the use of AI, including the recording.                    No follow-ups on file.    An electronic signature was used to authenticate this note.    --MARIA LUZ Sagastume NP on 2025 at 11:48 AM

## 2025-06-11 ENCOUNTER — RESULTS FOLLOW-UP (OUTPATIENT)
Dept: FAMILY MEDICINE CLINIC | Age: 45
End: 2025-06-11

## 2025-06-11 ENCOUNTER — HOSPITAL ENCOUNTER (OUTPATIENT)
Dept: MRI IMAGING | Age: 45
Discharge: HOME | End: 2025-06-11
Payer: COMMERCIAL

## 2025-06-11 DIAGNOSIS — R51.9 CHRONIC NONINTRACTABLE HEADACHE, UNSPECIFIED HEADACHE TYPE: Primary | ICD-10-CM

## 2025-06-11 DIAGNOSIS — R47.9 DIFFICULTY WITH SPEECH: ICD-10-CM

## 2025-06-11 DIAGNOSIS — G89.29 CHRONIC NONINTRACTABLE HEADACHE, UNSPECIFIED HEADACHE TYPE: Primary | ICD-10-CM

## 2025-06-11 DIAGNOSIS — R53.83 OTHER FATIGUE: ICD-10-CM

## 2025-06-11 DIAGNOSIS — G89.29 CHRONIC NONINTRACTABLE HEADACHE, UNSPECIFIED HEADACHE TYPE: ICD-10-CM

## 2025-06-11 DIAGNOSIS — R51.9 CHRONIC NONINTRACTABLE HEADACHE, UNSPECIFIED HEADACHE TYPE: ICD-10-CM

## 2025-06-11 PROCEDURE — 70551 MRI BRAIN STEM W/O DYE: CPT

## 2025-06-11 RX ORDER — BETAMETHASONE VALERATE 1.2 MG/G
CREAM TOPICAL
Qty: 45 G | Refills: 1 | Status: SHIPPED | OUTPATIENT
Start: 2025-06-11 | End: 2025-06-18

## 2025-06-13 ASSESSMENT — ENCOUNTER SYMPTOMS
COUGH: 0
ABDOMINAL PAIN: 0

## 2025-06-17 ENCOUNTER — OFFICE VISIT (OUTPATIENT)
Dept: FAMILY MEDICINE CLINIC | Age: 45
End: 2025-06-17
Payer: COMMERCIAL

## 2025-06-17 VITALS
HEART RATE: 86 BPM | DIASTOLIC BLOOD PRESSURE: 74 MMHG | WEIGHT: 247.2 LBS | BODY MASS INDEX: 43.79 KG/M2 | SYSTOLIC BLOOD PRESSURE: 132 MMHG | OXYGEN SATURATION: 97 %

## 2025-06-17 DIAGNOSIS — R07.9 CHEST PAIN, UNSPECIFIED TYPE: ICD-10-CM

## 2025-06-17 DIAGNOSIS — E78.1 HIGH TRIGLYCERIDES: ICD-10-CM

## 2025-06-17 DIAGNOSIS — R51.9 CHRONIC NONINTRACTABLE HEADACHE, UNSPECIFIED HEADACHE TYPE: ICD-10-CM

## 2025-06-17 DIAGNOSIS — F90.2 ATTENTION DEFICIT HYPERACTIVITY DISORDER (ADHD), COMBINED TYPE: Primary | ICD-10-CM

## 2025-06-17 DIAGNOSIS — R47.9 DIFFICULTY WITH SPEECH: ICD-10-CM

## 2025-06-17 DIAGNOSIS — G89.29 CHRONIC NONINTRACTABLE HEADACHE, UNSPECIFIED HEADACHE TYPE: ICD-10-CM

## 2025-06-17 DIAGNOSIS — R00.2 PALPITATIONS: ICD-10-CM

## 2025-06-17 DIAGNOSIS — R07.89 OTHER CHEST PAIN: ICD-10-CM

## 2025-06-17 DIAGNOSIS — R93.0 ABNORMAL MRI OF THE HEAD: ICD-10-CM

## 2025-06-17 PROCEDURE — G8427 DOCREV CUR MEDS BY ELIG CLIN: HCPCS | Performed by: FAMILY MEDICINE

## 2025-06-17 PROCEDURE — 1036F TOBACCO NON-USER: CPT | Performed by: FAMILY MEDICINE

## 2025-06-17 PROCEDURE — 99214 OFFICE O/P EST MOD 30 MIN: CPT | Performed by: FAMILY MEDICINE

## 2025-06-17 PROCEDURE — G8417 CALC BMI ABV UP PARAM F/U: HCPCS | Performed by: FAMILY MEDICINE

## 2025-06-17 PROCEDURE — 36415 COLL VENOUS BLD VENIPUNCTURE: CPT | Performed by: FAMILY MEDICINE

## 2025-06-17 RX ORDER — ESTRADIOL 0.1 MG/G
CREAM VAGINAL
COMMUNITY
Start: 2025-06-16

## 2025-06-17 RX ORDER — DEXTROAMPHETAMINE SACCHARATE, AMPHETAMINE ASPARTATE, DEXTROAMPHETAMINE SULFATE AND AMPHETAMINE SULFATE 2.5; 2.5; 2.5; 2.5 MG/1; MG/1; MG/1; MG/1
10 TABLET ORAL 2 TIMES DAILY
Qty: 60 TABLET | Refills: 0 | Status: SHIPPED | OUTPATIENT
Start: 2025-06-17 | End: 2025-07-17

## 2025-06-17 RX ORDER — NYSTATIN AND TRIAMCINOLONE ACETONIDE 100000; 1 [USP'U]/G; MG/G
CREAM TOPICAL 2 TIMES DAILY
COMMUNITY
Start: 2025-06-16 | End: 2025-06-23

## 2025-06-17 RX ORDER — FLUCONAZOLE 150 MG/1
TABLET ORAL
COMMUNITY
Start: 2025-06-14

## 2025-06-17 NOTE — PROGRESS NOTES
Chief Complaint   Patient presents with    Other     Discuss labs which are generally improved except for mild anemia    ADHD     Requesting ADHD refill    Neurologic Problem     Patient is having increased neurological symptoms, she was evaluated in the past for possible MS but has not followed with neuro but recently with nonspecific symptoms    Sinusitis     Recently with URI illness and severe headache       [place] NARRATIVE:    History of Present Illness  The patient presents for a refill of her ADHD medication and to discuss her recent lab results. She reports that her anemia has improved, and her metabolic panel is now normalized. She has concerns about her lipid levels, which were last checked in January 2024 and were essentially normal at that time.    She reports no adverse effects from her current ADHD medication and is seeking a refill.    She is interested in having her cholesterol levels checked again.    She experienced an episode of tachycardia last week, similar to an incident she reported a few weeks prior. This episode was accompanied by internal tremors and mild chest pain, described as achy rather than sharp. The pain subsided after a few minutes, allowing her to return to sleep. She did not experience any chest pain during the first episode.    She was treated for a sinus infection with antibiotics and steroids 2 weeks ago, which she believes have been effective as she no longer experiences headaches. She also reports an improvement in the swelling and tenderness in her sinuses. She experienced a headache yesterday but did not have any associated confusion or difficulty with word finding. She has not had any episodes of confusion or word finding difficulties since our last discussion. She has a family history of multiple sclerosis (MS) in her sister and grandmother. She previously consulted a neurologist, Dr. Tara Owens, who ordered a spinal fluid test. However, she experienced a severe

## 2025-06-18 LAB
CHOLEST SERPL-MCNC: 169 MG/DL (ref 0–199)
HDLC SERPL-MCNC: 53 MG/DL (ref 40–60)
LDL CHOLESTEROL: 88 MG/DL
TRIGL SERPL-MCNC: 142 MG/DL (ref 0–150)
VLDLC SERPL CALC-MCNC: 28 MG/DL

## 2025-06-19 ENCOUNTER — RESULTS FOLLOW-UP (OUTPATIENT)
Dept: FAMILY MEDICINE CLINIC | Age: 45
End: 2025-06-19

## 2025-06-24 ENCOUNTER — INITIAL CONSULT (OUTPATIENT)
Dept: CARDIOLOGY CLINIC | Age: 45
End: 2025-06-24
Payer: COMMERCIAL

## 2025-06-24 VITALS
OXYGEN SATURATION: 99 % | WEIGHT: 247.8 LBS | SYSTOLIC BLOOD PRESSURE: 134 MMHG | HEART RATE: 80 BPM | BODY MASS INDEX: 42.3 KG/M2 | HEIGHT: 64 IN | DIASTOLIC BLOOD PRESSURE: 84 MMHG

## 2025-06-24 DIAGNOSIS — R06.02 SHORTNESS OF BREATH: ICD-10-CM

## 2025-06-24 DIAGNOSIS — R07.9 CHEST PAIN, UNSPECIFIED TYPE: ICD-10-CM

## 2025-06-24 DIAGNOSIS — R00.2 PALPITATIONS: Primary | ICD-10-CM

## 2025-06-24 PROCEDURE — 99244 OFF/OP CNSLTJ NEW/EST MOD 40: CPT | Performed by: INTERNAL MEDICINE

## 2025-06-24 PROCEDURE — G8427 DOCREV CUR MEDS BY ELIG CLIN: HCPCS | Performed by: INTERNAL MEDICINE

## 2025-06-24 PROCEDURE — 93000 ELECTROCARDIOGRAM COMPLETE: CPT | Performed by: INTERNAL MEDICINE

## 2025-06-24 PROCEDURE — G8417 CALC BMI ABV UP PARAM F/U: HCPCS | Performed by: INTERNAL MEDICINE

## 2025-06-24 NOTE — PATIENT INSTRUCTIONS
**It is YOUR responsibilty to bring medication bottles and/or updated medication list to EACH APPOINTMENT. This will allow us to better serve you and all your healthcare needs**  Thank you for allowing us to care for you today!   We want to ensure we can follow your treatment plan and we strive to give you the best outcomes and experience possible.   If you ever have a life threatening emergency and call 911 - for an ambulance (EMS)   Our providers can only care for you at:   [facility] or [facility].   Even if you have someone take you or you drive yourself we can only care for you in a [facility] facility. Our providers are not setup at the other healthcare locations!       Thank you for allowing us to care for you today!   We want to ensure we can follow your treatment plan and we strive to give you the best outcomes and experience possible.   If you ever have a life threatening emergency and call 911 - for an ambulance (EMS)  REMEMBER  Our providers can only care for you at:   [facility] or [facility]   Even if you have someone take you or you drive yourself we can only care for you in a [facility] facility. Our providers are not setup at the other healthcare locations!    PLEASE CALL OUR OFFICE DURING NORMAL BUSINESS HOURS  Monday through Friday 8 am to 5 pm  AFTER HOURS the physician on-call cannot help with scheduling, rescheduling, procedure instruction questions or any type of medication need or issue.  [facility] P:[phone] - [facility] P:[phone] - [facility] P:[phone]      If you receive a survey:  We would appreciate you taking the time to share your experience concerning your provider visit in the office.    These surveys are confidential!  We are eager to improve and are counting on you to share your feedback so we can ensure you get the best care possible.

## 2025-06-24 NOTE — PROGRESS NOTES
CARDIOLOGY CONSULT NOTE   Reason for consultation:  chest pain    Referring physician:  No admitting provider for patient encounter.     Primary care physician: Jackie Gamboa MD      Dear   Thanks for the consult.    History of present illness:Nicho is a 45 y.o.year old who  presents with Chest pain is at left side, radiated to shoulder, 6/10, pressure like, associated with shortness of breath, sweating, nausea, relieved with rest and aggravated with exertion and also has palptiations and shortness of breath.    Chief Complaint   Patient presents with    Consultation    Chest Pain     Started end of may, woke her up out of her sleep, had palpitations with this, felt like her heart was racing, lasted a few minutes, has happened 1 time since then 1 wk ago, both times she was sleeping    Shortness of Breath     Recently sick, has been SOB with this sickness      Blood pressure, cholesterol, blood glucose and weight are well controlled.    Past medical history:    has a past medical history of ADHD (attention deficit hyperactivity disorder), Fibromyalgia, GERD (gastroesophageal reflux disease), Headache, History of pneumonia, Lateral epicondylitis, Obesity, and Pneumonia of right upper lobe due to infectious organism.  Past surgical history:   has a past surgical history that includes Tubal ligation ();  section (06);  section (11); Upper gastrointestinal endoscopy; Colonoscopy (N/A, 2020); and Upper gastrointestinal endoscopy (N/A, 2020).  Social History:   reports that she quit smoking about 26 years ago. Her smoking use included cigarettes. She has never used smokeless tobacco. She reports current alcohol use. She reports that she does not use drugs.  Family history:   no family history of CAD, STROKE of DM    Allergies   Allergen Reactions    Buspar [Buspirone]     Zoloft [Sertraline Hcl]        No current facility-administered medications for this visit.    Current Outpatient

## 2025-06-25 ASSESSMENT — ENCOUNTER SYMPTOMS
SINUS PRESSURE: 1
DIARRHEA: 0
NAUSEA: 0
SHORTNESS OF BREATH: 0
SORE THROAT: 1
COUGH: 1

## 2025-07-16 ENCOUNTER — TELEPHONE (OUTPATIENT)
Dept: CARDIOLOGY CLINIC | Age: 45
End: 2025-07-16

## 2025-07-23 DIAGNOSIS — F90.2 ATTENTION DEFICIT HYPERACTIVITY DISORDER (ADHD), COMBINED TYPE: ICD-10-CM

## 2025-07-23 RX ORDER — DEXTROAMPHETAMINE SACCHARATE, AMPHETAMINE ASPARTATE, DEXTROAMPHETAMINE SULFATE AND AMPHETAMINE SULFATE 2.5; 2.5; 2.5; 2.5 MG/1; MG/1; MG/1; MG/1
10 TABLET ORAL 2 TIMES DAILY
Qty: 60 TABLET | Refills: 0 | Status: SHIPPED | OUTPATIENT
Start: 2025-07-23 | End: 2025-08-22

## 2025-07-24 ENCOUNTER — OFFICE VISIT (OUTPATIENT)
Dept: FAMILY MEDICINE CLINIC | Age: 45
End: 2025-07-24
Payer: COMMERCIAL

## 2025-07-24 VITALS
SYSTOLIC BLOOD PRESSURE: 128 MMHG | OXYGEN SATURATION: 97 % | WEIGHT: 252 LBS | BODY MASS INDEX: 43.26 KG/M2 | DIASTOLIC BLOOD PRESSURE: 84 MMHG | HEART RATE: 81 BPM

## 2025-07-24 DIAGNOSIS — R20.0 PAIN AND NUMBNESS OF RIGHT UPPER EXTREMITY: Primary | ICD-10-CM

## 2025-07-24 DIAGNOSIS — R90.89 ABNORMAL BRAIN MRI: ICD-10-CM

## 2025-07-24 DIAGNOSIS — M79.601 PAIN AND NUMBNESS OF RIGHT UPPER EXTREMITY: Primary | ICD-10-CM

## 2025-07-24 DIAGNOSIS — R29.898 BILATERAL LEG WEAKNESS: ICD-10-CM

## 2025-07-24 DIAGNOSIS — R29.898 WEAKNESS OF RIGHT UPPER EXTREMITY: ICD-10-CM

## 2025-07-24 DIAGNOSIS — L98.9 FACIAL LESION: ICD-10-CM

## 2025-07-24 DIAGNOSIS — M79.7 FIBROMYALGIA: ICD-10-CM

## 2025-07-24 DIAGNOSIS — F90.2 ATTENTION DEFICIT HYPERACTIVITY DISORDER (ADHD), COMBINED TYPE: ICD-10-CM

## 2025-07-24 DIAGNOSIS — M62.81 MUSCLE WEAKNESS OF LEFT UPPER EXTREMITY: ICD-10-CM

## 2025-07-24 PROCEDURE — 1036F TOBACCO NON-USER: CPT | Performed by: FAMILY MEDICINE

## 2025-07-24 PROCEDURE — G8427 DOCREV CUR MEDS BY ELIG CLIN: HCPCS | Performed by: FAMILY MEDICINE

## 2025-07-24 PROCEDURE — G2211 COMPLEX E/M VISIT ADD ON: HCPCS | Performed by: FAMILY MEDICINE

## 2025-07-24 PROCEDURE — 99214 OFFICE O/P EST MOD 30 MIN: CPT | Performed by: FAMILY MEDICINE

## 2025-07-24 PROCEDURE — G8417 CALC BMI ABV UP PARAM F/U: HCPCS | Performed by: FAMILY MEDICINE

## 2025-07-24 NOTE — PROGRESS NOTES
Chief Complaint   Patient presents with    Extremity Weakness     Weakness and pain in limbs . Mostly right arm x 2-3 weeks     face rash      Patient concerned with brown lesions on face, has FHX skin cancer she cannot specify what kind    Fibromyalgia     Increased nighttime pain       [place] NARRATIVE:    History of Present Illness  The patient presents with complaints of weakness and pain in her limbs, particularly in the right arm, persisting for 2 to 3 weeks. She has a questionable history of multiple sclerosis (MS) and was referred to Neurology for follow-up last month.    She reports intermittent soreness in her right arm, which she first noticed a week ago. An incident occurred two days ago where she was unable to hand an empty cup to her son due to weakness in her hand. The weakness has since spread to both arms, although it is less severe in her left arm. She also experiences pain and weakness in her legs, particularly when trying to sleep or at the end of the day. The weakness is most noticeable when she attempts to lift her leg or push off with it, such as when rising from a chair or climbing stairs. She has not yet heard from the neurologist she was referred to last month. She reports no difficulty walking or bladder issues, although she does experience occasional urinary leakage when sneezing, a long-standing issue. She has a history of neck pain, which sometimes leads to headaches. She has previously undergone imaging for this issue, which suggested possible arthritis. She also reports that her arms feel unusually tight when lifting objects or styling her hair. She has previously undergone a spinal tap.    She is currently taking Robaxin for fibromyalgia but finds that the recent pain in her arms and legs is disrupting her sleep. She has tried combining Robaxin with Advil, but this has not provided relief. She is considering trying Tylenol or Excedrin and is seeking advice on whether these medications

## 2025-07-29 ENCOUNTER — OFFICE VISIT (OUTPATIENT)
Dept: CARDIOLOGY CLINIC | Age: 45
End: 2025-07-29
Payer: COMMERCIAL

## 2025-07-29 VITALS
RESPIRATION RATE: 20 BRPM | HEART RATE: 84 BPM | WEIGHT: 256 LBS | HEIGHT: 64 IN | OXYGEN SATURATION: 97 % | BODY MASS INDEX: 43.71 KG/M2 | SYSTOLIC BLOOD PRESSURE: 126 MMHG | DIASTOLIC BLOOD PRESSURE: 72 MMHG

## 2025-07-29 DIAGNOSIS — R00.2 PALPITATIONS: Primary | ICD-10-CM

## 2025-07-29 DIAGNOSIS — Z82.49 FAMILY HISTORY OF CORONARY ARTERY DISEASE: ICD-10-CM

## 2025-07-29 DIAGNOSIS — R07.9 CHEST PAIN, UNSPECIFIED TYPE: ICD-10-CM

## 2025-07-29 DIAGNOSIS — R06.02 SHORTNESS OF BREATH: ICD-10-CM

## 2025-07-29 PROCEDURE — G8427 DOCREV CUR MEDS BY ELIG CLIN: HCPCS | Performed by: INTERNAL MEDICINE

## 2025-07-29 PROCEDURE — 99214 OFFICE O/P EST MOD 30 MIN: CPT | Performed by: INTERNAL MEDICINE

## 2025-07-29 PROCEDURE — 1036F TOBACCO NON-USER: CPT | Performed by: INTERNAL MEDICINE

## 2025-07-29 PROCEDURE — G8417 CALC BMI ABV UP PARAM F/U: HCPCS | Performed by: INTERNAL MEDICINE

## 2025-07-29 RX ORDER — METOPROLOL TARTRATE 25 MG/1
12.5 TABLET, FILM COATED ORAL 2 TIMES DAILY
Qty: 30 TABLET | Refills: 5 | Status: SHIPPED | OUTPATIENT
Start: 2025-07-29

## 2025-07-29 NOTE — PROGRESS NOTES
Bo Wilson MD        OFFICE  FOLLOWUP NOTE    Chief complaints: patient is here for management of CAD, DM, HTN, AFIB, DYSLPIDEMIA    Subjective: patient feels better, no chest pain, no shortness of breath, no dizziness, no palpitations    LINETTE Torre is a 45 y.o.year old who  has a past medical history of ADHD (attention deficit hyperactivity disorder), Fibromyalgia, GERD (gastroesophageal reflux disease), Headache, History of pneumonia, Lateral epicondylitis, Obesity, and Pneumonia of right upper lobe due to infectious organism. and presents for management of CAD, DM, HTN, AFIB, which are well controlled      Current Outpatient Medications   Medication Sig Dispense Refill    amphetamine-dextroamphetamine (ADDERALL, 10MG,) 10 MG tablet Take 1 tablet by mouth 2 times daily for 30 days. Max Daily Amount: 20 mg 60 tablet 0    OnabotulinumtoxinA (BOTOX IJ) Inject as directed      methocarbamol (ROBAXIN) 750 MG tablet TAKE 1 TABLET BY MOUTH EVERY DAY AT NIGHT 30 tablet 2    pantoprazole (PROTONIX) 40 MG tablet Take 1 tablet by mouth 2 times daily 60 tablet 5    ondansetron (ZOFRAN ODT) 4 MG disintegrating tablet Take 1 tablet by mouth every 8 hours as needed for Nausea or Vomiting 30 tablet 5    acetaminophen (TYLENOL) 500 MG tablet Take 2 tablets by mouth      valACYclovir (VALTREX) 1 g tablet Take 1 tablet by mouth daily      Multiple Vitamins-Minerals (MULTI COMPLETE PO) Take by mouth      estradiol (ESTRACE) 0.1 MG/GM vaginal cream Apply pea sized amount to the affected area QHS x2 weeks followed by twice weekly (Patient not taking: Reported on 7/29/2025)       No current facility-administered medications for this visit.     Allergies: Buspar [buspirone] and Zoloft [sertraline hcl]  Past Medical History:   Diagnosis Date    ADHD (attention deficit hyperactivity disorder) 2021    Fibromyalgia     GERD (gastroesophageal reflux disease)     Headache     History of pneumonia     Lateral

## 2025-08-01 ENCOUNTER — HOSPITAL ENCOUNTER (OUTPATIENT)
Dept: MRI IMAGING | Age: 45
Discharge: HOME | End: 2025-08-01
Payer: COMMERCIAL

## 2025-08-01 DIAGNOSIS — R29.898 WEAKNESS OF RIGHT UPPER EXTREMITY: ICD-10-CM

## 2025-08-01 DIAGNOSIS — R29.898 BILATERAL LEG WEAKNESS: ICD-10-CM

## 2025-08-01 DIAGNOSIS — R90.89 ABNORMAL BRAIN MRI: ICD-10-CM

## 2025-08-01 DIAGNOSIS — M62.81 MUSCLE WEAKNESS OF LEFT UPPER EXTREMITY: ICD-10-CM

## 2025-08-01 DIAGNOSIS — M79.601 PAIN AND NUMBNESS OF RIGHT UPPER EXTREMITY: ICD-10-CM

## 2025-08-01 DIAGNOSIS — R20.0 PAIN AND NUMBNESS OF RIGHT UPPER EXTREMITY: ICD-10-CM

## 2025-08-01 PROCEDURE — 72141 MRI NECK SPINE W/O DYE: CPT

## 2025-08-26 DIAGNOSIS — R11.0 NAUSEA: ICD-10-CM

## 2025-08-26 RX ORDER — ONDANSETRON 4 MG/1
4 TABLET, ORALLY DISINTEGRATING ORAL EVERY 8 HOURS PRN
Qty: 30 TABLET | Refills: 5 | Status: SHIPPED | OUTPATIENT
Start: 2025-08-26

## (undated) DEVICE — GLOVE SURG SZ 65 THK91MIL LTX FREE SYN POLYISOPRENE

## (undated) DEVICE — DEFENDO AIR WATER SUCTION AND BIOPSY VALVE KIT FOR  OLYMPUS: Brand: DEFENDO AIR/WATER/SUCTION AND BIOPSY VALVE

## (undated) DEVICE — LINER SUCT CANSTR 1500CC SEMI RIG W/ POR HYDROPHOBIC SHUT

## (undated) DEVICE — FORCEPS BX L240CM JAW DIA2.8MM L CAP W/ NDL MIC MESH TOOTH

## (undated) DEVICE — BRUSH CLN DIA5MM NYL SGL END CBL ASST FLEX DSTL TIP POLYPR

## (undated) DEVICE — GLOVE SURG SZ 7 L12IN THK7.5MIL DK GRN LTX FREE MSG6570] MEDLINE INDUSTRIES INC]

## (undated) DEVICE — TUBING, SUCTION, 9/32" X 10', STRAIGHT: Brand: MEDLINE

## (undated) DEVICE — YANKAUER,FLEXIBLE HANDLE,REGLR CAPACITY: Brand: MEDLINE INDUSTRIES, INC.

## (undated) DEVICE — BW-412T DISP COMBO CLEANING BRUSH: Brand: SINGLE USE COMBINATION CLEANING BRUSH

## (undated) DEVICE — LINE SAMP O2 6.5FT W/FEMALE CONN F/ADULT CAPNOLINE PLUS

## (undated) DEVICE — TUBING, SUCTION, 3/16" X 6', STRAIGHT: Brand: MEDLINE

## (undated) DEVICE — THE TORRENT IRRIGATION SCOPE CONNECTOR IS USED WITH THE TORRENT IRRIGATION TUBING TO PROVIDE IRRIGATION FLUIDS SUCH AS STERILE WATER DURING GASTROINTESTINAL ENDOSCOPIC PROCEDURES WHEN USED IN CONJUNCTION WITH AN IRRIGATION PUMP (OR ELECTROSURGICAL UNIT).: Brand: TORRENT

## (undated) DEVICE — KENDALL 500 SERIES DIAPHORETIC FOAM MONITORING ELECTRODE - TEAR DROP SHAPE ( 30/PK): Brand: KENDALL

## (undated) DEVICE — JELLY LUBRICATING 3 GM BACTERIOSTATIC

## (undated) DEVICE — Z DISCONTINUED (USE MFG CAT MVABO)  TUBING GAS SAMPLING STD 6.5 FT FEMALE CONN SMRT CAPNOLINE